# Patient Record
Sex: MALE | Race: WHITE | NOT HISPANIC OR LATINO | URBAN - METROPOLITAN AREA
[De-identification: names, ages, dates, MRNs, and addresses within clinical notes are randomized per-mention and may not be internally consistent; named-entity substitution may affect disease eponyms.]

---

## 2017-05-01 ENCOUNTER — EMERGENCY (EMERGENCY)
Facility: HOSPITAL | Age: 72
LOS: 0 days | Discharge: HOME | End: 2017-05-01
Admitting: INTERNAL MEDICINE

## 2017-06-28 DIAGNOSIS — M79.675 PAIN IN LEFT TOE(S): ICD-10-CM

## 2017-06-28 DIAGNOSIS — M79.89 OTHER SPECIFIED SOFT TISSUE DISORDERS: ICD-10-CM

## 2017-06-28 DIAGNOSIS — Z98.890 OTHER SPECIFIED POSTPROCEDURAL STATES: ICD-10-CM

## 2017-09-29 ENCOUNTER — OUTPATIENT (OUTPATIENT)
Dept: OUTPATIENT SERVICES | Facility: HOSPITAL | Age: 72
LOS: 1 days | Discharge: HOME | End: 2017-09-29

## 2017-09-29 DIAGNOSIS — E55.9 VITAMIN D DEFICIENCY, UNSPECIFIED: ICD-10-CM

## 2017-09-29 DIAGNOSIS — K76.89 OTHER SPECIFIED DISEASES OF LIVER: ICD-10-CM

## 2017-09-29 DIAGNOSIS — N18.2 CHRONIC KIDNEY DISEASE, STAGE 2 (MILD): ICD-10-CM

## 2017-09-29 DIAGNOSIS — E11.9 TYPE 2 DIABETES MELLITUS WITHOUT COMPLICATIONS: ICD-10-CM

## 2017-09-29 DIAGNOSIS — N40.0 BENIGN PROSTATIC HYPERPLASIA WITHOUT LOWER URINARY TRACT SYMPTOMS: ICD-10-CM

## 2017-09-29 DIAGNOSIS — D64.9 ANEMIA, UNSPECIFIED: ICD-10-CM

## 2018-02-16 ENCOUNTER — OUTPATIENT (OUTPATIENT)
Dept: OUTPATIENT SERVICES | Facility: HOSPITAL | Age: 73
LOS: 1 days | Discharge: HOME | End: 2018-02-16

## 2018-02-16 DIAGNOSIS — D64.9 ANEMIA, UNSPECIFIED: ICD-10-CM

## 2018-02-16 DIAGNOSIS — N18.2 CHRONIC KIDNEY DISEASE, STAGE 2 (MILD): ICD-10-CM

## 2018-02-16 DIAGNOSIS — M32.9 SYSTEMIC LUPUS ERYTHEMATOSUS, UNSPECIFIED: ICD-10-CM

## 2018-02-16 DIAGNOSIS — E11.9 TYPE 2 DIABETES MELLITUS WITHOUT COMPLICATIONS: ICD-10-CM

## 2018-02-16 DIAGNOSIS — E78.00 PURE HYPERCHOLESTEROLEMIA, UNSPECIFIED: ICD-10-CM

## 2018-02-16 DIAGNOSIS — K76.89 OTHER SPECIFIED DISEASES OF LIVER: ICD-10-CM

## 2018-02-16 DIAGNOSIS — E55.9 VITAMIN D DEFICIENCY, UNSPECIFIED: ICD-10-CM

## 2018-06-27 ENCOUNTER — OUTPATIENT (OUTPATIENT)
Dept: OUTPATIENT SERVICES | Facility: HOSPITAL | Age: 73
LOS: 1 days | Discharge: HOME | End: 2018-06-27

## 2018-06-27 DIAGNOSIS — K76.89 OTHER SPECIFIED DISEASES OF LIVER: ICD-10-CM

## 2018-06-27 DIAGNOSIS — D51.9 VITAMIN B12 DEFICIENCY ANEMIA, UNSPECIFIED: ICD-10-CM

## 2018-06-27 DIAGNOSIS — E78.00 PURE HYPERCHOLESTEROLEMIA, UNSPECIFIED: ICD-10-CM

## 2018-06-27 DIAGNOSIS — N40.0 BENIGN PROSTATIC HYPERPLASIA WITHOUT LOWER URINARY TRACT SYMPTOMS: ICD-10-CM

## 2018-06-27 DIAGNOSIS — E11.9 TYPE 2 DIABETES MELLITUS WITHOUT COMPLICATIONS: ICD-10-CM

## 2018-07-19 ENCOUNTER — OUTPATIENT (OUTPATIENT)
Dept: OUTPATIENT SERVICES | Facility: HOSPITAL | Age: 73
LOS: 1 days | Discharge: HOME | End: 2018-07-19

## 2018-07-19 DIAGNOSIS — N39.9 DISORDER OF URINARY SYSTEM, UNSPECIFIED: ICD-10-CM

## 2018-11-20 ENCOUNTER — OUTPATIENT (OUTPATIENT)
Dept: OUTPATIENT SERVICES | Facility: HOSPITAL | Age: 73
LOS: 1 days | Discharge: HOME | End: 2018-11-20

## 2018-11-20 DIAGNOSIS — E11.9 TYPE 2 DIABETES MELLITUS WITHOUT COMPLICATIONS: ICD-10-CM

## 2018-11-20 DIAGNOSIS — N39.0 URINARY TRACT INFECTION, SITE NOT SPECIFIED: ICD-10-CM

## 2018-11-20 DIAGNOSIS — G93.3 POSTVIRAL AND RELATED FATIGUE SYNDROMES: ICD-10-CM

## 2018-11-20 DIAGNOSIS — E78.00 PURE HYPERCHOLESTEROLEMIA, UNSPECIFIED: ICD-10-CM

## 2019-01-16 ENCOUNTER — INPATIENT (INPATIENT)
Facility: HOSPITAL | Age: 74
LOS: 3 days | Discharge: HOME HEALTH | End: 2019-01-20
Attending: HOSPITALIST | Admitting: HOSPITALIST

## 2019-01-16 VITALS
RESPIRATION RATE: 18 BRPM | HEART RATE: 68 BPM | TEMPERATURE: 99 F | DIASTOLIC BLOOD PRESSURE: 83 MMHG | SYSTOLIC BLOOD PRESSURE: 145 MMHG | OXYGEN SATURATION: 99 %

## 2019-01-16 DIAGNOSIS — Z90.79 ACQUIRED ABSENCE OF OTHER GENITAL ORGAN(S): Chronic | ICD-10-CM

## 2019-01-16 DIAGNOSIS — I50.43 ACUTE ON CHRONIC COMBINED SYSTOLIC (CONGESTIVE) AND DIASTOLIC (CONGESTIVE) HEART FAILURE: ICD-10-CM

## 2019-01-16 DIAGNOSIS — T82.855A STENOSIS OF CORONARY ARTERY STENT, INITIAL ENCOUNTER: ICD-10-CM

## 2019-01-16 LAB
ALBUMIN SERPL ELPH-MCNC: 4.5 G/DL — SIGNIFICANT CHANGE UP (ref 3.5–5.2)
ALP SERPL-CCNC: 73 U/L — SIGNIFICANT CHANGE UP (ref 30–115)
ALT FLD-CCNC: 26 U/L — SIGNIFICANT CHANGE UP (ref 0–41)
ANION GAP SERPL CALC-SCNC: 17 MMOL/L — HIGH (ref 7–14)
ANION GAP SERPL CALC-SCNC: 20 MMOL/L — HIGH (ref 7–14)
ANION GAP SERPL CALC-SCNC: 22 MMOL/L — HIGH (ref 7–14)
APTT BLD: 136.6 SEC — CRITICAL HIGH (ref 27–39.2)
APTT BLD: 29.9 SEC — SIGNIFICANT CHANGE UP (ref 27–39.2)
APTT BLD: 42.1 SEC — HIGH (ref 27–39.2)
AST SERPL-CCNC: 22 U/L — SIGNIFICANT CHANGE UP (ref 0–41)
BASE EXCESS BLDV CALC-SCNC: 0.2 MMOL/L — SIGNIFICANT CHANGE UP (ref -2–2)
BASOPHILS # BLD AUTO: 0.02 K/UL — SIGNIFICANT CHANGE UP (ref 0–0.2)
BASOPHILS NFR BLD AUTO: 0.2 % — SIGNIFICANT CHANGE UP (ref 0–1)
BILIRUB SERPL-MCNC: 0.5 MG/DL — SIGNIFICANT CHANGE UP (ref 0.2–1.2)
BUN SERPL-MCNC: 19 MG/DL — SIGNIFICANT CHANGE UP (ref 10–20)
BUN SERPL-MCNC: 20 MG/DL — SIGNIFICANT CHANGE UP (ref 10–20)
BUN SERPL-MCNC: 20 MG/DL — SIGNIFICANT CHANGE UP (ref 10–20)
CA-I SERPL-SCNC: 1.18 MMOL/L — SIGNIFICANT CHANGE UP (ref 1.12–1.3)
CALCIUM SERPL-MCNC: 9.1 MG/DL — SIGNIFICANT CHANGE UP (ref 8.5–10.1)
CALCIUM SERPL-MCNC: 9.2 MG/DL — SIGNIFICANT CHANGE UP (ref 8.5–10.1)
CALCIUM SERPL-MCNC: 9.3 MG/DL — SIGNIFICANT CHANGE UP (ref 8.5–10.1)
CHLORIDE SERPL-SCNC: 100 MMOL/L — SIGNIFICANT CHANGE UP (ref 98–110)
CHLORIDE SERPL-SCNC: 96 MMOL/L — LOW (ref 98–110)
CHLORIDE SERPL-SCNC: 99 MMOL/L — SIGNIFICANT CHANGE UP (ref 98–110)
CHOLEST SERPL-MCNC: 146 MG/DL — SIGNIFICANT CHANGE UP (ref 100–200)
CK MB CFR SERPL CALC: 573.6 NG/ML — HIGH (ref 0.6–6.3)
CK MB CFR SERPL CALC: >300 NG/ML — HIGH (ref 0.6–6.3)
CK SERPL-CCNC: 4791 U/L — HIGH (ref 0–225)
CK SERPL-CCNC: 5000 U/L — HIGH (ref 0–225)
CO2 SERPL-SCNC: 15 MMOL/L — LOW (ref 17–32)
CO2 SERPL-SCNC: 21 MMOL/L — SIGNIFICANT CHANGE UP (ref 17–32)
CO2 SERPL-SCNC: 22 MMOL/L — SIGNIFICANT CHANGE UP (ref 17–32)
CREAT SERPL-MCNC: 0.9 MG/DL — SIGNIFICANT CHANGE UP (ref 0.7–1.5)
CREAT SERPL-MCNC: 1 MG/DL — SIGNIFICANT CHANGE UP (ref 0.7–1.5)
CREAT SERPL-MCNC: 1 MG/DL — SIGNIFICANT CHANGE UP (ref 0.7–1.5)
EOSINOPHIL # BLD AUTO: 0.01 K/UL — SIGNIFICANT CHANGE UP (ref 0–0.7)
EOSINOPHIL NFR BLD AUTO: 0.1 % — SIGNIFICANT CHANGE UP (ref 0–8)
ESTIMATED AVERAGE GLUCOSE: 117 MG/DL — HIGH (ref 68–114)
GAS PNL BLDV: 140 MMOL/L — SIGNIFICANT CHANGE UP (ref 136–145)
GAS PNL BLDV: SIGNIFICANT CHANGE UP
GLUCOSE BLDC GLUCOMTR-MCNC: 138 MG/DL — HIGH (ref 70–99)
GLUCOSE SERPL-MCNC: 149 MG/DL — HIGH (ref 70–99)
GLUCOSE SERPL-MCNC: 151 MG/DL — HIGH (ref 70–99)
GLUCOSE SERPL-MCNC: 169 MG/DL — HIGH (ref 70–99)
HBA1C BLD-MCNC: 5.7 % — HIGH (ref 4–5.6)
HCO3 BLDV-SCNC: 25 MMOL/L — SIGNIFICANT CHANGE UP (ref 22–29)
HCT VFR BLD CALC: 45.3 % — SIGNIFICANT CHANGE UP (ref 42–52)
HCT VFR BLD CALC: 47.4 % — SIGNIFICANT CHANGE UP (ref 42–52)
HCT VFR BLD CALC: 48.2 % — SIGNIFICANT CHANGE UP (ref 42–52)
HCT VFR BLDA CALC: 50.1 % — HIGH (ref 34–44)
HDLC SERPL-MCNC: 46 MG/DL — SIGNIFICANT CHANGE UP
HGB BLD CALC-MCNC: 16.3 G/DL — SIGNIFICANT CHANGE UP (ref 14–18)
HGB BLD-MCNC: 15.6 G/DL — SIGNIFICANT CHANGE UP (ref 14–18)
HGB BLD-MCNC: 16 G/DL — SIGNIFICANT CHANGE UP (ref 14–18)
HGB BLD-MCNC: 16.4 G/DL — SIGNIFICANT CHANGE UP (ref 14–18)
IMM GRANULOCYTES NFR BLD AUTO: 0.5 % — HIGH (ref 0.1–0.3)
INR BLD: 1.12 RATIO — SIGNIFICANT CHANGE UP (ref 0.65–1.3)
INR BLD: 1.2 RATIO — SIGNIFICANT CHANGE UP (ref 0.65–1.3)
LACTATE BLDV-MCNC: 1.9 MMOL/L — HIGH (ref 0.5–1.6)
LIPID PNL WITH DIRECT LDL SERPL: 87 MG/DL — SIGNIFICANT CHANGE UP (ref 4–129)
LYMPHOCYTES # BLD AUTO: 1.1 K/UL — LOW (ref 1.2–3.4)
LYMPHOCYTES # BLD AUTO: 8.5 % — LOW (ref 20.5–51.1)
MAGNESIUM SERPL-MCNC: 1.8 MG/DL — SIGNIFICANT CHANGE UP (ref 1.8–2.4)
MAGNESIUM SERPL-MCNC: 1.9 MG/DL — SIGNIFICANT CHANGE UP (ref 1.8–2.4)
MCHC RBC-ENTMCNC: 28.4 PG — SIGNIFICANT CHANGE UP (ref 27–31)
MCHC RBC-ENTMCNC: 28.6 PG — SIGNIFICANT CHANGE UP (ref 27–31)
MCHC RBC-ENTMCNC: 29 PG — SIGNIFICANT CHANGE UP (ref 27–31)
MCHC RBC-ENTMCNC: 33.8 G/DL — SIGNIFICANT CHANGE UP (ref 32–37)
MCHC RBC-ENTMCNC: 34 G/DL — SIGNIFICANT CHANGE UP (ref 32–37)
MCHC RBC-ENTMCNC: 34.4 G/DL — SIGNIFICANT CHANGE UP (ref 32–37)
MCV RBC AUTO: 83.1 FL — SIGNIFICANT CHANGE UP (ref 80–94)
MCV RBC AUTO: 84.2 FL — SIGNIFICANT CHANGE UP (ref 80–94)
MCV RBC AUTO: 85.3 FL — SIGNIFICANT CHANGE UP (ref 80–94)
MONOCYTES # BLD AUTO: 0.89 K/UL — HIGH (ref 0.1–0.6)
MONOCYTES NFR BLD AUTO: 6.8 % — SIGNIFICANT CHANGE UP (ref 1.7–9.3)
NEUTROPHILS # BLD AUTO: 10.92 K/UL — HIGH (ref 1.4–6.5)
NEUTROPHILS NFR BLD AUTO: 83.9 % — HIGH (ref 42.2–75.2)
NRBC # BLD: 0 /100 WBCS — SIGNIFICANT CHANGE UP (ref 0–0)
NT-PROBNP SERPL-SCNC: 165 PG/ML — SIGNIFICANT CHANGE UP (ref 0–300)
PCO2 BLDV: 39 MMHG — LOW (ref 41–51)
PH BLDV: 7.41 — SIGNIFICANT CHANGE UP (ref 7.26–7.43)
PHOSPHATE SERPL-MCNC: 2.6 MG/DL — SIGNIFICANT CHANGE UP (ref 2.1–4.9)
PLATELET # BLD AUTO: 207 K/UL — SIGNIFICANT CHANGE UP (ref 130–400)
PLATELET # BLD AUTO: 213 K/UL — SIGNIFICANT CHANGE UP (ref 130–400)
PLATELET # BLD AUTO: 227 K/UL — SIGNIFICANT CHANGE UP (ref 130–400)
PO2 BLDV: 41 MMHG — HIGH (ref 20–40)
POTASSIUM BLDV-SCNC: 3.5 MMOL/L — SIGNIFICANT CHANGE UP (ref 3.3–5.6)
POTASSIUM SERPL-MCNC: 3.9 MMOL/L — SIGNIFICANT CHANGE UP (ref 3.5–5)
POTASSIUM SERPL-MCNC: 4.8 MMOL/L — SIGNIFICANT CHANGE UP (ref 3.5–5)
POTASSIUM SERPL-MCNC: 5.1 MMOL/L — HIGH (ref 3.5–5)
POTASSIUM SERPL-SCNC: 3.9 MMOL/L — SIGNIFICANT CHANGE UP (ref 3.5–5)
POTASSIUM SERPL-SCNC: 4.8 MMOL/L — SIGNIFICANT CHANGE UP (ref 3.5–5)
POTASSIUM SERPL-SCNC: 5.1 MMOL/L — HIGH (ref 3.5–5)
PROT SERPL-MCNC: 7.5 G/DL — SIGNIFICANT CHANGE UP (ref 6–8)
PROTHROM AB SERPL-ACNC: 12.9 SEC — HIGH (ref 9.95–12.87)
PROTHROM AB SERPL-ACNC: 13.8 SEC — HIGH (ref 9.95–12.87)
RBC # BLD: 5.45 M/UL — SIGNIFICANT CHANGE UP (ref 4.7–6.1)
RBC # BLD: 5.63 M/UL — SIGNIFICANT CHANGE UP (ref 4.7–6.1)
RBC # BLD: 5.65 M/UL — SIGNIFICANT CHANGE UP (ref 4.7–6.1)
RBC # FLD: 12.9 % — SIGNIFICANT CHANGE UP (ref 11.5–14.5)
RBC # FLD: 13 % — SIGNIFICANT CHANGE UP (ref 11.5–14.5)
RBC # FLD: 13.2 % — SIGNIFICANT CHANGE UP (ref 11.5–14.5)
SAO2 % BLDV: 77 % — SIGNIFICANT CHANGE UP
SODIUM SERPL-SCNC: 133 MMOL/L — LOW (ref 135–146)
SODIUM SERPL-SCNC: 138 MMOL/L — SIGNIFICANT CHANGE UP (ref 135–146)
SODIUM SERPL-SCNC: 141 MMOL/L — SIGNIFICANT CHANGE UP (ref 135–146)
TOTAL CHOLESTEROL/HDL RATIO MEASUREMENT: 3.2 RATIO — LOW (ref 4–5.5)
TRIGL SERPL-MCNC: 141 MG/DL — SIGNIFICANT CHANGE UP (ref 10–149)
TROPONIN T SERPL-MCNC: 0.01 NG/ML — SIGNIFICANT CHANGE UP
TROPONIN T SERPL-MCNC: 10.63 NG/ML — CRITICAL HIGH
TROPONIN T SERPL-MCNC: 5.94 NG/ML — CRITICAL HIGH
WBC # BLD: 10.58 K/UL — SIGNIFICANT CHANGE UP (ref 4.8–10.8)
WBC # BLD: 13 K/UL — HIGH (ref 4.8–10.8)
WBC # BLD: 19.88 K/UL — HIGH (ref 4.8–10.8)
WBC # FLD AUTO: 10.58 K/UL — SIGNIFICANT CHANGE UP (ref 4.8–10.8)
WBC # FLD AUTO: 13 K/UL — HIGH (ref 4.8–10.8)
WBC # FLD AUTO: 19.88 K/UL — HIGH (ref 4.8–10.8)

## 2019-01-16 RX ORDER — ATORVASTATIN CALCIUM 80 MG/1
80 TABLET, FILM COATED ORAL AT BEDTIME
Qty: 0 | Refills: 0 | Status: DISCONTINUED | OUTPATIENT
Start: 2019-01-16 | End: 2019-01-20

## 2019-01-16 RX ORDER — ASPIRIN/CALCIUM CARB/MAGNESIUM 324 MG
162 TABLET ORAL DAILY
Qty: 0 | Refills: 0 | Status: DISCONTINUED | OUTPATIENT
Start: 2019-01-16 | End: 2019-01-16

## 2019-01-16 RX ORDER — NITROGLYCERIN 6.5 MG
0.4 CAPSULE, EXTENDED RELEASE ORAL ONCE
Qty: 0 | Refills: 0 | Status: COMPLETED | OUTPATIENT
Start: 2019-01-16 | End: 2019-01-16

## 2019-01-16 RX ORDER — ONDANSETRON 8 MG/1
4 TABLET, FILM COATED ORAL ONCE
Qty: 0 | Refills: 0 | Status: COMPLETED | OUTPATIENT
Start: 2019-01-16 | End: 2019-01-17

## 2019-01-16 RX ORDER — TICAGRELOR 90 MG/1
90 TABLET ORAL
Qty: 0 | Refills: 0 | Status: DISCONTINUED | OUTPATIENT
Start: 2019-01-16 | End: 2019-01-19

## 2019-01-16 RX ORDER — CLOPIDOGREL BISULFATE 75 MG/1
300 TABLET, FILM COATED ORAL ONCE
Qty: 0 | Refills: 0 | Status: DISCONTINUED | OUTPATIENT
Start: 2019-01-16 | End: 2019-01-16

## 2019-01-16 RX ORDER — CLOPIDOGREL BISULFATE 75 MG/1
600 TABLET, FILM COATED ORAL ONCE
Qty: 0 | Refills: 0 | Status: COMPLETED | OUTPATIENT
Start: 2019-01-16 | End: 2019-01-16

## 2019-01-16 RX ORDER — HEPARIN SODIUM 5000 [USP'U]/ML
1000 INJECTION INTRAVENOUS; SUBCUTANEOUS
Qty: 25000 | Refills: 0 | Status: DISCONTINUED | OUTPATIENT
Start: 2019-01-16 | End: 2019-01-16

## 2019-01-16 RX ORDER — CHLORHEXIDINE GLUCONATE 213 G/1000ML
1 SOLUTION TOPICAL
Qty: 0 | Refills: 0 | Status: DISCONTINUED | OUTPATIENT
Start: 2019-01-16 | End: 2019-01-20

## 2019-01-16 RX ORDER — ASPIRIN/CALCIUM CARB/MAGNESIUM 324 MG
81 TABLET ORAL DAILY
Qty: 0 | Refills: 0 | Status: DISCONTINUED | OUTPATIENT
Start: 2019-01-16 | End: 2019-01-16

## 2019-01-16 RX ORDER — ENOXAPARIN SODIUM 100 MG/ML
40 INJECTION SUBCUTANEOUS DAILY
Qty: 0 | Refills: 0 | Status: DISCONTINUED | OUTPATIENT
Start: 2019-01-16 | End: 2019-01-16

## 2019-01-16 RX ORDER — PANTOPRAZOLE SODIUM 20 MG/1
8 TABLET, DELAYED RELEASE ORAL
Qty: 80 | Refills: 0 | Status: DISCONTINUED | OUTPATIENT
Start: 2019-01-16 | End: 2019-01-16

## 2019-01-16 RX ORDER — PANTOPRAZOLE SODIUM 20 MG/1
40 TABLET, DELAYED RELEASE ORAL EVERY 12 HOURS
Qty: 0 | Refills: 0 | Status: DISCONTINUED | OUTPATIENT
Start: 2019-01-16 | End: 2019-01-20

## 2019-01-16 RX ORDER — TICAGRELOR 90 MG/1
180 TABLET ORAL ONCE
Qty: 0 | Refills: 0 | Status: COMPLETED | OUTPATIENT
Start: 2019-01-16 | End: 2019-01-16

## 2019-01-16 RX ORDER — PANTOPRAZOLE SODIUM 20 MG/1
80 TABLET, DELAYED RELEASE ORAL ONCE
Qty: 0 | Refills: 0 | Status: DISCONTINUED | OUTPATIENT
Start: 2019-01-16 | End: 2019-01-16

## 2019-01-16 RX ORDER — HEPARIN SODIUM 5000 [USP'U]/ML
600 INJECTION INTRAVENOUS; SUBCUTANEOUS
Qty: 25000 | Refills: 0 | Status: DISCONTINUED | OUTPATIENT
Start: 2019-01-16 | End: 2019-01-17

## 2019-01-16 RX ORDER — MORPHINE SULFATE 50 MG/1
2 CAPSULE, EXTENDED RELEASE ORAL EVERY 4 HOURS
Qty: 0 | Refills: 0 | Status: DISCONTINUED | OUTPATIENT
Start: 2019-01-16 | End: 2019-01-20

## 2019-01-16 RX ORDER — ONDANSETRON 8 MG/1
4 TABLET, FILM COATED ORAL ONCE
Qty: 0 | Refills: 0 | Status: COMPLETED | OUTPATIENT
Start: 2019-01-16 | End: 2019-01-16

## 2019-01-16 RX ORDER — SODIUM CHLORIDE 9 MG/ML
1000 INJECTION INTRAMUSCULAR; INTRAVENOUS; SUBCUTANEOUS
Qty: 0 | Refills: 0 | Status: DISCONTINUED | OUTPATIENT
Start: 2019-01-16 | End: 2019-01-17

## 2019-01-16 RX ORDER — LOSARTAN POTASSIUM 100 MG/1
50 TABLET, FILM COATED ORAL DAILY
Qty: 0 | Refills: 0 | Status: DISCONTINUED | OUTPATIENT
Start: 2019-01-16 | End: 2019-01-16

## 2019-01-16 RX ORDER — METOPROLOL TARTRATE 50 MG
25 TABLET ORAL
Qty: 0 | Refills: 0 | Status: DISCONTINUED | OUTPATIENT
Start: 2019-01-16 | End: 2019-01-16

## 2019-01-16 RX ORDER — CLOPIDOGREL BISULFATE 75 MG/1
75 TABLET, FILM COATED ORAL DAILY
Qty: 0 | Refills: 0 | Status: DISCONTINUED | OUTPATIENT
Start: 2019-01-16 | End: 2019-01-16

## 2019-01-16 RX ORDER — ATORVASTATIN CALCIUM 80 MG/1
40 TABLET, FILM COATED ORAL AT BEDTIME
Qty: 0 | Refills: 0 | Status: DISCONTINUED | OUTPATIENT
Start: 2019-01-16 | End: 2019-01-16

## 2019-01-16 RX ORDER — ASPIRIN/CALCIUM CARB/MAGNESIUM 324 MG
81 TABLET ORAL DAILY
Qty: 0 | Refills: 0 | Status: DISCONTINUED | OUTPATIENT
Start: 2019-01-16 | End: 2019-01-20

## 2019-01-16 RX ORDER — METOPROLOL TARTRATE 50 MG
50 TABLET ORAL
Qty: 0 | Refills: 0 | Status: DISCONTINUED | OUTPATIENT
Start: 2019-01-16 | End: 2019-01-18

## 2019-01-16 RX ORDER — PANTOPRAZOLE SODIUM 20 MG/1
40 TABLET, DELAYED RELEASE ORAL
Qty: 0 | Refills: 0 | Status: DISCONTINUED | OUTPATIENT
Start: 2019-01-16 | End: 2019-01-16

## 2019-01-16 RX ORDER — LOSARTAN POTASSIUM 100 MG/1
25 TABLET, FILM COATED ORAL DAILY
Qty: 0 | Refills: 0 | Status: DISCONTINUED | OUTPATIENT
Start: 2019-01-16 | End: 2019-01-16

## 2019-01-16 RX ADMIN — HEPARIN SODIUM 8 UNIT(S)/HR: 5000 INJECTION INTRAVENOUS; SUBCUTANEOUS at 16:30

## 2019-01-16 RX ADMIN — CLOPIDOGREL BISULFATE 300 MILLIGRAM(S): 75 TABLET, FILM COATED ORAL at 12:00

## 2019-01-16 RX ADMIN — Medication 50 MILLIGRAM(S): at 19:37

## 2019-01-16 RX ADMIN — Medication 25 MILLIGRAM(S): at 07:18

## 2019-01-16 RX ADMIN — HEPARIN SODIUM 6 UNIT(S)/HR: 5000 INJECTION INTRAVENOUS; SUBCUTANEOUS at 20:15

## 2019-01-16 RX ADMIN — HEPARIN SODIUM 8 UNIT(S)/HR: 5000 INJECTION INTRAVENOUS; SUBCUTANEOUS at 21:59

## 2019-01-16 RX ADMIN — ATORVASTATIN CALCIUM 80 MILLIGRAM(S): 80 TABLET, FILM COATED ORAL at 21:51

## 2019-01-16 RX ADMIN — Medication 81 MILLIGRAM(S): at 03:31

## 2019-01-16 RX ADMIN — Medication 0.4 MILLIGRAM(S): at 12:00

## 2019-01-16 RX ADMIN — TICAGRELOR 180 MILLIGRAM(S): 90 TABLET ORAL at 14:48

## 2019-01-16 RX ADMIN — TICAGRELOR 90 MILLIGRAM(S): 90 TABLET ORAL at 19:37

## 2019-01-16 RX ADMIN — CLOPIDOGREL BISULFATE 600 MILLIGRAM(S): 75 TABLET, FILM COATED ORAL at 03:32

## 2019-01-16 RX ADMIN — Medication 81 MILLIGRAM(S): at 14:45

## 2019-01-16 RX ADMIN — MORPHINE SULFATE 2 MILLIGRAM(S): 50 CAPSULE, EXTENDED RELEASE ORAL at 14:46

## 2019-01-16 RX ADMIN — PANTOPRAZOLE SODIUM 40 MILLIGRAM(S): 20 TABLET, DELAYED RELEASE ORAL at 16:47

## 2019-01-16 RX ADMIN — ONDANSETRON 4 MILLIGRAM(S): 8 TABLET, FILM COATED ORAL at 12:00

## 2019-01-16 RX ADMIN — Medication 0.4 MILLIGRAM(S): at 11:45

## 2019-01-16 RX ADMIN — PANTOPRAZOLE SODIUM 40 MILLIGRAM(S): 20 TABLET, DELAYED RELEASE ORAL at 10:56

## 2019-01-16 RX ADMIN — ONDANSETRON 4 MILLIGRAM(S): 8 TABLET, FILM COATED ORAL at 23:31

## 2019-01-16 RX ADMIN — CHLORHEXIDINE GLUCONATE 1 APPLICATION(S): 213 SOLUTION TOPICAL at 10:57

## 2019-01-16 RX ADMIN — MORPHINE SULFATE 2 MILLIGRAM(S): 50 CAPSULE, EXTENDED RELEASE ORAL at 16:44

## 2019-01-16 RX ADMIN — SODIUM CHLORIDE 75 MILLILITER(S): 9 INJECTION INTRAMUSCULAR; INTRAVENOUS; SUBCUTANEOUS at 13:17

## 2019-01-16 NOTE — CONSULT NOTE ADULT - SUBJECTIVE AND OBJECTIVE BOX
Patient is a 73y old  Male who presents with a chief complaint of chest pain   HPI:  73 yr old male with PMH of prostate cancer s/p prostatectomy p/w acute onset substernal chest pressure associated with left arm numbness which started 30 mins prior to presentation to ED. ECG showed ST elevation and STEMI code was called to which cardiology team responded immediately and assessed pt bedside. Pt will be transferred to cath lab for emergent cardiac cath. Pt denies fever/palpitations/cough/ h/o heart disease     ROS:  All other systems reviewed and are negative    PAST MEDICAL & SURGICAL HISTORY  Prostate cancer  H/O prostatectomy      FAMILY HISTORY:  FAMILY HISTORY:  non contributory    SOCIAL HISTORY:  []smoker-fomer smoker   [-]Alcohol  [-]Drug    ALLERGIES:  No Known Allergies      MEDICATIONS:  MEDICATIONS  (STANDING):  aspirin  chewable 81 milliGRAM(s) Oral daily  aspirin  chewable 81 milliGRAM(s) Oral daily    MEDICATIONS  (PRN):      HOME MEDICATIONS:  Home Medications:      VITALS:   T(F): 98.7 (01-16 @ 03:30), Max: 98.7 (01-16 @ 03:30)  HR: 68 (01-16 @ 03:30) (68 - 68)  BP: 145/83 (01-16 @ 03:30) (145/83 - 145/83)  BP(mean): 105 (01-16 @ 03:30) (105 - 105)  RR: 18 (01-16 @ 03:30) (18 - 18)  SpO2: 99% (01-16 @ 03:30) (99% - 99%)    I&O's Summary      PHYSICAL EXAM:  GEN: Alert and oriented X 3, No acute distress  HEENT: no pallor  NECK: Supple, no JVD  LUNGS: Clear to auscultation bilaterally  CARDIOVASCULAR: S1/S2 regular, no murmurs or rubs  ABD: Soft, BS+  EXT: No Lower extremity edema/cyanosis  NEURO: Non focal  SKIN: Intact    LABS:                    Troponin trend:        Hemoglobin A1C   Thyroid      RADIOLOGY:    ECG:  SR, anterolateral ST elevation

## 2019-01-16 NOTE — ED PROVIDER NOTE - OBJECTIVE STATEMENT
73 y.o male with no known medical problems presents to the ED for evaluation of midsternal chest pain 30 minutes prior to arrival that radiates down left arm.  Upon EMS arrival pt with STEMI.  Given nitro with relief of the pain.  Adds no other complaints at this time.  Denies dyspnea, hemoptysis, fever, chills, URI sxs.

## 2019-01-16 NOTE — ED PROVIDER NOTE - PROGRESS NOTE DETAILS
cardiology fellow at bedside discussed case with Dr. Mcdonough.  aware of admission cardiology fellow anugu at bedside I personally evaluated the patient. I reviewed the Physician Assistant Fellow's note and agree with the findings and plan.

## 2019-01-16 NOTE — CHART NOTE - NSCHARTNOTEFT_GEN_A_CORE
Called by RN as pressure on Femstop was reduced than initially intended. Inflated femstop to approx 80 mmHg myself. Patient complaining of mild nausea, instructed housestaff to start patient on Zofran PRN. Patient's groin is soft with no sig. palpable hematoma. Called by RN as pressure on Femstop was reduced than initially intended. Inflated femstop to approx 80 mmHg myself. Patient complaining of mild nausea, instructed housestaff to start patient on Zofran PRN. Patient's groin is soft with small sized palpable hematoma.

## 2019-01-16 NOTE — PROGRESS NOTE ADULT - SUBJECTIVE AND OBJECTIVE BOX
POST OPERATIVE PROCEDURAL DOCUMENTATION   Preliminary Cardiac Catherization Post-Procedure Report:    PRE-OP DIAGNOSIS: stemi, anterior wall    PROCEDURE:     Left Heart Catheterization     LV Angiogram     Selective Coronary Angiogram     PCI of LAD     IVUS of LAD    Primary Physician:  Wagner Randhawa M.D.  Cardiac Fellow:      ANESTHESIA TYPE: Local    ESTIMATED BLOOD LOSS:  Less than 10 cc    CONDITION: Stable    SPECIMENS REMOVED (IF APPLICABLE): Not Applicable    IMPLANTS (IF APPLICABLE): DANYELLE    DESCRIPTION OF PROCEDURRE:  The right and left femoral groins, as well as the right radial artery were prepped and draped in the usual sterile fashion. Following local instillation of lidocane, a 6Fr introducer was inserted percutaneously into the right femoral artery  using the seldinger technique. Following this, multiple guidewires and pre shaped catheters were used to adequately opacifiy the coronary arteries and perform left heart catheterization as well as an LV angiogram, using the Judkin's approach. Left heart catheterization and left ventricular angiogram was performed in the OCAMPO projection. Following this, selective coronary angiography was performed in multiple obliquities. Pci was then performed on the LAD. Ivus was also done on athe lad stent.  At the end of the procedure, all guidewires and catheters were removed. Homostasis was obtained by manual compression. There were no complications.                                 FINDINGS OF PROCEDURE:  1. Left Heart Catheterization: LVEDP: 20-25                                                       LV Pressure: 130/20-24                                                       CA Pressure: 130/70                   2. LV Angiogram:  The LV is moderately enlarged                                   Overall, there is severe LV systolic dysfunction. There is akenesia of anterior wall and apex.                                   No significant segmental wall contraction abnormalities seen.                                   The EF is approx. 20%                                   No mitral valve prolapse, mitral insufficiency or mitral annular calcification seen.   3. SELECTIVE CORONARY ANGIOGRAM:                           LEFT MAIN: Normal                           LAD: 100% ostial occlusion                           LCX: 75% distal lesion                           OBTUSE MARGINAL: 50-75% lesion                           RCA: 50-75% proximal lesions                           PDA: Normal                           PLV: Normal  4. The coronary circulation was right  dominant.    5.  PCI was performed on the 100% ostial LAD occlusion. Pre-dilated with a 2.5 and 3.0mm balloon. 2 DANYELLE were placed in the proximal and mid LAD. Following deployement there were no residual lesions and TIMI3 flow     Post Procedure Diagnosis/Impression: Successful PCI with placement of DANYELLE in the 100% proximal LAD, Severe LV systolic dysfunction.                         Complications:  None    PLAN OF CARE:       CCU monitoriing                               Return to In-patient bed                              Continue DAPT, B-blocker, Statin therapy and ace inhibitor                              Continue medical therapy

## 2019-01-16 NOTE — CHART NOTE - NSCHARTNOTEFT_GEN_A_CORE
Patient seen and examined by myself, labs reviewed  Patient still has nausea, did not receive any zofran. Reinforced RN to administer zofran PRN as requested in EMR  Femstop deflated, groin is soft, small hematoma appreciated, not growing in size, no visible signs of bleeding  Labs showed subtherapeutic PTT, reinforced to housestaff to increase IV heparin to maintain therapeutic PTT

## 2019-01-16 NOTE — PROGRESS NOTE ADULT - SUBJECTIVE AND OBJECTIVE BOX
POST OPERATIVE PROCEDURAL DOCUMENTATION   Preliminary Cardiac Catherization Post-Procedure Report:    PRE-OP DIAGNOSIS:     PROCEDURE:     Selective Coronary Angiogram     Peripheral Angiogram     Insertion of IABP    Primary Physician:  Wagner Randhawa M.D.    ANESTHESIA TYPE: Sedation/Local/Regional    ESTIMATED BLOOD LOSS:  Less than 10 cc    CONDITION: Critical    SPECIMENS REMOVED (IF APPLICABLE): Not Applicable    IMPLANTS (IF APPLICABLE): DANYELLE    DESCRIPTION OF PROCEDURRE:  The right femoral artery was draped in usual sterile fashion. A 6 [place] citizen introducer was placed percutaneously and selective coronary angiogram was done.                                                   This revealed 100% occlusion of the stent at the proximal edge.                                                    Repeat PCI was performed and placement of a 2.25 mm DANYELLE placed in the distal LAD                                                   Post stent deployement and balloon dilation of the proximal stent at high pressures with a non-compliaant balloon, there was LEIGHTON 3 flow and excellent angiographic result     In addition, an Intra-aortic ballon pump was inserted for hemodynamic support for 24 hours.     PERIPHERAL ANGIOGRAM was performed, which revealed no significant aorta-illiac disease. A closure device using ___ was done.        Post Procedure Diagnosis/Impression:  Stent Thrombosis with successful recanilization of the proximal DANYELLE with POBA and placement of a DANYELLE in the distal LAD                         Complications:  None    PLAN OF CARE:  CCU, anti-platelet therapy

## 2019-01-16 NOTE — H&P ADULT - FAMILY HISTORY
Sibling  Still living? Yes, Estimated age: Age Unknown  Family history of heart attack, Age at diagnosis: Age Unknown

## 2019-01-16 NOTE — H&P ADULT - NSHPLABSRESULTS_GEN_ALL_CORE
15.6   10.58 )-----------( 227      ( 16 Jan 2019 03:19 )             45.3               PT/INR - ( 16 Jan 2019 03:19 )   PT: 12.90 sec;   INR: 1.12 ratio         PTT - ( 16 Jan 2019 03:19 )  PTT:29.9 sec              POCT Blood Glucose.: 158 mg/dL (16 Jan 2019 03:24)

## 2019-01-16 NOTE — ED ADULT NURSE NOTE - NSIMPLEMENTINTERV_GEN_ALL_ED
Implemented All Universal Safety Interventions:  [place] to call system. Call bell, personal items and telephone within reach. Instruct patient to call for assistance. Room bathroom lighting operational. Non-slip footwear when patient is off stretcher. Physically safe environment: no spills, clutter or unnecessary equipment. Stretcher in lowest position, wheels locked, appropriate side rails in place.

## 2019-01-16 NOTE — PATIENT PROFILE ADULT - INFORMATION PROVIDED TO:
Pt is concerned about ongoing upper back pain. He notices symptom after sitting for long periods. Occasional episode after walking up a flight of stairs with SOB. He has used sl NTG 3-5x/week with relief of upper back pain. Denies chest pain.  His lower back pain subsided once he stopped Repatha. Currently not taking any statin.    NM stress test 5/2017:   IMPRESSION:  1. Normal stress/rest myocardial perfusion imaging with evidence for  diaphragmatic attenuation artifact in the basilar inferior wall.  2. Normal gated study with a calculated ejection fraction of 66%.   Compared to the prior stress test from September 15, 2015, no significant  difference is were appreciated.  Stress portion:  IMPRESSION:  Treadmill did not show any definitive signs for coronary artery disease as the patient had no chest pain or mid scapular pain with exercise.  He has an abnormal resting 12-lead ECG, which was predominantly abnormal in the inferior limb leads with exaggeration of the abnormalities with exercise.  Correlation with a nuclear scan is required and will be reported separately.  The patient's exercise capacity was only 4.6 METS, which is only 50% of that predicted for his age, but he did achieve 95% of his maximum predicted pulse.    Last clinic note 3/1/2016:   1.  Coronary artery disease s/p CABG in July 2013 ALIZE-LAD, SVG-OM2.    Due to episodes of chest pain/ shoulder and arm pain with exertion pt underwent cardiac cath 2/2016.  His bypass grafts are patent but patient had developed severe disease in OM 3 .  He underwent successful percutaneous intervention and stenting of proximal and mid segment of the left circumflex artery with drug-eluting stents.  Now chest pain free   Developed shortness of breath with Brilinta which has resolved after he was switched to Plavix.  Pt to continue with aspirin and Plavix.   Encouraged to exercise regularly.   2. Hyperlipidemia:   Intolerant to multiple statins in the past  .  Currently tolerating Livalo and Zetia for the last 3 weeks.   Recommend Mediterranean diet.  Check FLP in 3 weeks.   3. PAD:   KWAME 1/2014 showed resting KWAME of 1.1 and post exercise KWAME of 0.9 bilaterally . No symptoms of claudication.   4.  Post op Atrial fib: follows with Dr Marion, continue with Sotalol and aspirin    Will discuss with Dr Crystal.     patient

## 2019-01-16 NOTE — ED PROVIDER NOTE - PHYSICAL EXAMINATION
CONST: Well appearing in NAD  EYES: Sclera and conjunctiva clear.  ENT: No nasal discharge.  Oropharynx normal appearing, no erythema or exudates. Uvula midline.  NECK: Non-tender, supple  CARD: Normal S1 S2; Normal rate and rhythm  RESP: Equal BS B/L, No wheezes, rhonchi or rales. No distress  GI: Soft, non-tender, non-distended.  MS: Normal ROM in all extremities. No edema of lower extremities, no calf pain, radial pulses 2+ bilaterally  SKIN: Warm, dry, no acute rashes. Good turgor  NEURO: A&Ox3, No focal deficits. Strength 5/5 with no sensory deficits

## 2019-01-16 NOTE — H&P ADULT - ATTENDING COMMENTS
HPI as above.  Interval history:  Pt seen and examined at bedside.   Vitals stable  Exam stable  Labs reviewed  Plan continue current management   #Handoff:  Pending:  Family discussion:  Dispo: HPI as above.  Interval history: Currently having active chest pain.  Pt seen and examined at bedside.  Currently sob and having active cp. Seen by Cardio fellow planning to take pt to CAth lab again.  Vital Signs (24 Hrs):  T(C): 36.4 (01-16-19 @ 08:00), Max: 37.1 (01-16-19 @ 03:30)  HR: 66 (01-16-19 @ 11:42) (66 - 80)  BP: 116/77 (01-16-19 @ 11:42) (109/81 - 145/83)  RR: 24 (01-16-19 @ 11:42) (15 - 24)  SpO2: 97% (01-16-19 @ 11:42) (94% - 99%)  Wt(kg): --  Daily Height in cm: 170.18 (16 Jan 2019 06:00)    Daily     I&O's Summary    16 Jan 2019 07:01  -  16 Jan 2019 13:24  --------------------------------------------------------  IN: 100 mL / OUT: 400 mL / NET: -300 mL      Exam stable  Labs reviewed    74 y/o male with PMHx of prostate cancer s/p prostatectomy p/w Chest pain 30 mins before presentation. patient was found to have STEMI and patient was take to the cath lab.    #STEMI- s/p PCI was performed on the 100% ostial LAD occlusion. Pre-dilated with a 2.5 and 3.0mm balloon. 2 DANYELLE were placed in the proximal and mid LAD. EF 20%.  -follow up re cath   -continue medical management as per cardiology   -Pt will need AICD for primary prevention once stable  - Follow up echo in 3 months  -Check A1c          #Handoff:  Pending:  Family discussion:  Dispo:

## 2019-01-16 NOTE — H&P ADULT - HISTORY OF PRESENT ILLNESS
74 y/o male with PMHx of prostate cancer s/p prostatectomy p/w Chest pain 30 mins before presentation. patient chest pain started as pressure, 10/10, radiating to the left arm. it was associated with sweating, nausea and SOB. patient felt weak and came to the ED. No fever, cough, v/d, bowel or urinary symptoms. In the ED, patient was found to have STEMI in the antro-lateral leads.   Patient has no cardiac history and he doesn't take medications. 72 y/o male with PMHx of prostate cancer s/p prostatectomy p/w Chest pain 30 mins before presentation. patient chest pain started as pressure, 10/10, radiating to the left arm. it was associated with sweating, nausea and SOB. patient felt weak and came to the ED. No fever, cough, v/d, bowel or urinary symptoms. In the ED, patient was found to have STEMI in the antro-lateral leads.   Patient has no cardiac history and he doesn't take medications.  in the ED Vital Signs Last 24 Hrs  T(C): 37.1 (16 Jan 2019 03:30), Max: 37.1 (16 Jan 2019 03:30)  T(F): 98.7 (16 Jan 2019 03:30), Max: 98.7 (16 Jan 2019 03:30)  HR: 68 (16 Jan 2019 03:30) (68 - 68)  BP: 145/83 (16 Jan 2019 03:30) (145/83 - 145/83)  BP(mean): 105 (16 Jan 2019 03:30) (105 - 105)  RR: 18 (16 Jan 2019 03:30) (18 - 18)  SpO2: 99% (16 Jan 2019 03:30) (99% - 99%)    Patient was give aspirin 81 x2 and Plavix 600

## 2019-01-16 NOTE — ED PROVIDER NOTE - NS ED ROS FT
Constitutional: See HPI.  Eyes: No visual changes, eye pain or discharge.   ENMT: No hearing changes, pain, discharge or   Cardiac: + chest pain. No SOB or edema.   Respiratory: No cough or respiratory distress. No hemoptysis.   GI: No nausea, vomiting, diarrhea or abdominal pain.  : No dysuria, frequency or burning. No Discharge  MS: No myalgia, muscle weakness, joint pain or back pain.  Neuro: No headache or weakness. No LOC.  Skin: No skin rash.  Except as documented in the HPI, all other systems are negative.

## 2019-01-16 NOTE — H&P ADULT - ASSESSMENT
72 y/o male with PMHx of prostate cancer s/p prostatectomy p/w Chest pain 30 mins before presentation. patient was found to have STEMI and patient was take to the cath lab.      #STEMI   admit to ccu after cath  monitor VS  2d echo  lipid profile and HBa1c  aspirin and plavis load  BB and statin start after cath    #Others  NPO for now   DVT and GI PPX  from home  Full code

## 2019-01-16 NOTE — PROGRESS NOTE ADULT - SUBJECTIVE AND OBJECTIVE BOX
EVELYN MORALES 73y Male  MRN#: [id number]   CODE STATUS:________      SUBJECTIVE  HPI    Overnight events     Subjective complaints     Present Today:   - Rose  - Drains   - Central Line :                                             ----------------------------------------------------------  OBJECTIVE  PAST MEDICAL & SURGICAL HISTORY  Prostate cancer  H/O prostatectomy                                            -----------------------------------------------------------  ALLERGIES:  No Known Allergies                                            ------------------------------------------------------------  MEDICATIONS:  STANDING MEDICATIONS  aspirin enteric coated 81 milliGRAM(s) Oral daily  atorvastatin 80 milliGRAM(s) Oral at bedtime  chlorhexidine 4% Liquid 1 Application(s) Topical <User Schedule>  metoprolol tartrate 50 milliGRAM(s) Oral two times a day  nitroglycerin     SubLingual 0.4 milliGRAM(s) SubLingual once  nitroglycerin     SubLingual 0.4 milliGRAM(s) SubLingual once  ondansetron Injectable 4 milliGRAM(s) IV Push once  ondansetron Injectable 4 milliGRAM(s) IV Push once  pantoprazole  Injectable 40 milliGRAM(s) IV Push every 12 hours  sodium chloride 0.9%. 1000 milliLiter(s) IV Continuous <Continuous>  ticagrelor 90 milliGRAM(s) Oral two times a day  ticagrelor 180 milliGRAM(s) Oral once    PRN MEDICATIONS  morphine  - Injectable 2 milliGRAM(s) IV Push every 4 hours PRN                                            ------------------------------------------------------------  VITAL SIGNS: Last 24 Hours  T(C): 36.4 (16 Jan 2019 08:00), Max: 37.1 (16 Jan 2019 03:30)  T(F): 97.6 (16 Jan 2019 08:00), Max: 98.7 (16 Jan 2019 03:30)  HR: 66 (16 Jan 2019 11:42) (66 - 80)  BP: 116/77 (16 Jan 2019 11:42) (109/81 - 145/83)  BP(mean): 99 (16 Jan 2019 11:42) (89 - 105)  RR: 24 (16 Jan 2019 11:42) (15 - 24)  SpO2: 97% (16 Jan 2019 11:42) (94% - 99%)      01-16-19 @ 07:01  -  01-16-19 @ 14:39  --------------------------------------------------------  IN: 100 mL / OUT: 400 mL / NET: -300 mL                                             --------------------------------------------------------------  LABS:                        16.0   13.00 )-----------( 207      ( 16 Jan 2019 11:29 )             47.4     01-16    138  |  99  |  20  ----------------------------<  149<H>  4.8   |  22  |  1.0    Ca    9.2      16 Jan 2019 11:29  Phos  2.6     01-16  Mg     1.9     01-16    TPro  7.5  /  Alb  4.5  /  TBili  0.5  /  DBili  x   /  AST  22  /  ALT  26  /  AlkPhos  73  01-16    PT/INR - ( 16 Jan 2019 03:19 )   PT: 12.90 sec;   INR: 1.12 ratio         PTT - ( 16 Jan 2019 03:19 )  PTT:29.9 sec      Creatine Kinase, Serum: 4791 U/L <H> (01-16-19 @ 11:50)  Troponin T, Serum: 5.94 ng/mL <HH> (01-16-19 @ 11:50)  Troponin T, Serum: 0.01 ng/mL (01-16-19 @ 03:19)          CARDIAC MARKERS ( 16 Jan 2019 11:50 )  x     / 5.94 ng/mL / 4791 U/L / x     / 573.6 ng/mL  CARDIAC MARKERS ( 16 Jan 2019 03:19 )  x     / 0.01 ng/mL / x     / x     / x                                                  -------------------------------------------------------------  RADIOLOGY:                                            --------------------------------------------------------------    PHYSICAL EXAM:    GENERAL: NAD, well-developed, AAOx3  HEENT:  Atraumatic, Normocephalic. EOMI, PERRLA, conjunctiva and sclera clear, No JVD  PULMONARY: Clear to auscultation bilaterally; No wheeze  CARDIOVASCULAR: Regular rate and rhythm; No murmurs, rubs, or gallops  GASTROINTESTINAL: Soft, Nontender, Nondistended; Bowel sounds present  MUSCULOSKELETAL:  2+ Peripheral Pulses, No clubbing, cyanosis, or edema  NEUROLOGY: non-focal  SKIN: No rashes or lesions                                           --------------------------------------------------------------    ASSESSMENT & PLAN    # DVT prophylaxis     # GI prophylaxis     # Diet     # Activity     # Code status     # Disposition                                                                              -------------------------------------------------------- EVELYN MORALES 73y Male  MRN#: [id number]   CODE STATUS: FULL CODE      SUBJECTIVE  Overnight events - None. Pt is s/p cath with 2 DANYELLE were placed in the proximal and mid LAD.     Subjective complaints - This AM around 12pm noon, pt was complaining of chest discomfort with nausea, and diaphoresis. As per pt, The pain felt the same as the chest pain on admission. 12 lead EKG was done which showed     Present Today:   -Rose  -A line with balloon pump    OBJECTIVE  PAST MEDICAL & SURGICAL HISTORY  Prostate cancer  H/O prostatectomy                                            -----------------------------------------------------------  ALLERGIES:  No Known Allergies                                            ------------------------------------------------------------  MEDICATIONS:  STANDING MEDICATIONS  aspirin enteric coated 81 milliGRAM(s) Oral daily  atorvastatin 80 milliGRAM(s) Oral at bedtime  chlorhexidine 4% Liquid 1 Application(s) Topical <User Schedule>  metoprolol tartrate 50 milliGRAM(s) Oral two times a day  nitroglycerin     SubLingual 0.4 milliGRAM(s) SubLingual once  nitroglycerin     SubLingual 0.4 milliGRAM(s) SubLingual once  ondansetron Injectable 4 milliGRAM(s) IV Push once  ondansetron Injectable 4 milliGRAM(s) IV Push once  pantoprazole  Injectable 40 milliGRAM(s) IV Push every 12 hours  sodium chloride 0.9%. 1000 milliLiter(s) IV Continuous <Continuous>  ticagrelor 90 milliGRAM(s) Oral two times a day  ticagrelor 180 milliGRAM(s) Oral once    PRN MEDICATIONS  morphine  - Injectable 2 milliGRAM(s) IV Push every 4 hours PRN                                            ------------------------------------------------------------  VITAL SIGNS: Last 24 Hours  T(C): 36.4 (16 Jan 2019 08:00), Max: 37.1 (16 Jan 2019 03:30)  T(F): 97.6 (16 Jan 2019 08:00), Max: 98.7 (16 Jan 2019 03:30)  HR: 66 (16 Jan 2019 11:42) (66 - 80)  BP: 116/77 (16 Jan 2019 11:42) (109/81 - 145/83)  BP(mean): 99 (16 Jan 2019 11:42) (89 - 105)  RR: 24 (16 Jan 2019 11:42) (15 - 24)  SpO2: 97% (16 Jan 2019 11:42) (94% - 99%)      01-16-19 @ 07:01  -  01-16-19 @ 14:39  --------------------------------------------------------  IN: 100 mL / OUT: 400 mL / NET: -300 mL                                             --------------------------------------------------------------  LABS:                        16.0   13.00 )-----------( 207      ( 16 Jan 2019 11:29 )             47.4     01-16    138  |  99  |  20  ----------------------------<  149<H>  4.8   |  22  |  1.0    Ca    9.2      16 Jan 2019 11:29  Phos  2.6     01-16  Mg     1.9     01-16    TPro  7.5  /  Alb  4.5  /  TBili  0.5  /  DBili  x   /  AST  22  /  ALT  26  /  AlkPhos  73  01-16    PT/INR - ( 16 Jan 2019 03:19 )   PT: 12.90 sec;   INR: 1.12 ratio      PTT - ( 16 Jan 2019 03:19 )  PTT:29.9 sec    Creatine Kinase, Serum: 4791 U/L <H> (01-16-19 @ 11:50)  Troponin T, Serum: 5.94 ng/mL <HH> (01-16-19 @ 11:50)  Troponin T, Serum: 0.01 ng/mL (01-16-19 @ 03:19)    CARDIAC MARKERS ( 16 Jan 2019 11:50 )  x     / 5.94 ng/mL / 4791 U/L / x     / 573.6 ng/mL  CARDIAC MARKERS ( 16 Jan 2019 03:19 )  x     / 0.01 ng/mL / x     / x     / x                                                  -------------------------------------------------------------  RADIOLOGY:  < from: Xray Chest 1 View AP/PA (01.16.19 @ 03:54) >    Impression:      Low lung volumes Limited evaluation.  Follow-up suggested.    < from: Transthoracic Echocardiogram (01.16.19 @ 08:54) >  Summary:   1. Left ventricular ejection fraction, by visual estimation, is 35 to   40%.   2. Multiple left ventricular regional wall motion abnormalities exist.   See wall motion findings.   3. Mildly increased LV wall thickness.   4. Spectral Doppler shows impaired relaxation pattern of left   ventricular myocardial filling (Grade I diastolic dysfunction).   5. Trivial pericardial effusion.   6. Mild tricuspid regurgitation.   7. Mild aortic regurgitation.   8. Estimated pulmonary artery systolic pressure is 48.4 mmHg assuming a   right atrial pressure of 5 mmHg, which is consistent with mild pulmonary   hypertension.                                              --------------------------------------------------------------    PHYSICAL EXAM:    GENERAL: Distress due to pain. Diaphoretic.   HEENT:  LAILA  PULMONARY: Basialr crackles  CARDIOVASCULAR: Regular rate and rhythm  GASTROINTESTINAL: Soft, Nontender, Nondistended; Bowel sounds present  MUSCULOSKELETAL:  2+ Peripheral Pulses, No clubbing, cyanosis, or edema  NEUROLOGY: non-focal  SKIN: No rashes or lesions                                           --------------------------------------------------------------    ASSESSMENT & PLAN    72 y/o male with PMHx of prostate cancer s/p prostatectomy p/w Chest pain 30 mins before presentation. patient was found to have STEMI and patient was take to the cath lab.    #STEMI       #DVT prophylaxis On Heparin gtt  #GI prophylaxis Protonix IV bid  #Diet DASH  #Activity IAT  #Code status FULL  #Disposition c/w CCU                                                                             -------------------------------------------------------- EVELYN MORALES 73y Male  MRN#: [id number]   CODE STATUS: FULL CODE      SUBJECTIVE  Overnight events - None. Pt is s/p cath with 2 DANYELLE were placed in the proximal and mid LAD.     Subjective complaints - This AM around 12pm noon, pt was complaining of chest discomfort with nausea, and diaphoresis. As per pt, The pain felt the same as the chest pain on admission. 12 lead EKG was done. Stat cardiac enzymes were sent. Cardiac fellow informed. Pt was taken for emergent cath. Pt was loaded with Brilinta but was nauseous and had episode of vomiting. Pt was on integrelin drip while in cath lab. Pt to be started on Brilinta d/c Plavix and start heparin. Pt has a balloon pump in.     Present Today:   -Rose  -A line with balloon pump    OBJECTIVE  PAST MEDICAL & SURGICAL HISTORY  Prostate cancer  H/O prostatectomy                                            -----------------------------------------------------------  ALLERGIES:  No Known Allergies                                            ------------------------------------------------------------  MEDICATIONS:  STANDING MEDICATIONS  aspirin enteric coated 81 milliGRAM(s) Oral daily  atorvastatin 80 milliGRAM(s) Oral at bedtime  chlorhexidine 4% Liquid 1 Application(s) Topical <User Schedule>  metoprolol tartrate 50 milliGRAM(s) Oral two times a day  nitroglycerin SubLingual 0.4 milliGRAM(s) SubLingual once  nitroglycerin SubLingual 0.4 milliGRAM(s) SubLingual once  ondansetron Injectable 4 milliGRAM(s) IV Push once  ondansetron Injectable 4 milliGRAM(s) IV Push once  pantoprazole  Injectable 40 milliGRAM(s) IV Push every 12 hours  sodium chloride 0.9%. 1000 milliLiter(s) IV Continuous <Continuous>  ticagrelor 90 milliGRAM(s) Oral two times a day  ticagrelor 180 milliGRAM(s) Oral once    PRN MEDICATIONS  morphine  - Injectable 2 milliGRAM(s) IV Push every 4 hours PRN                                            ------------------------------------------------------------  VITAL SIGNS: Last 24 Hours  T(C): 36.4 (16 Jan 2019 08:00), Max: 37.1 (16 Jan 2019 03:30)  T(F): 97.6 (16 Jan 2019 08:00), Max: 98.7 (16 Jan 2019 03:30)  HR: 66 (16 Jan 2019 11:42) (66 - 80)  BP: 116/77 (16 Jan 2019 11:42) (109/81 - 145/83)  BP(mean): 99 (16 Jan 2019 11:42) (89 - 105)  RR: 24 (16 Jan 2019 11:42) (15 - 24)  SpO2: 97% (16 Jan 2019 11:42) (94% - 99%)      01-16-19 @ 07:01  -  01-16-19 @ 14:39  --------------------------------------------------------  IN: 100 mL / OUT: 400 mL / NET: -300 mL                                             --------------------------------------------------------------  LABS:                        16.0   13.00 )-----------( 207      ( 16 Jan 2019 11:29 )             47.4     01-16    138  |  99  |  20  ----------------------------<  149<H>  4.8   |  22  |  1.0    Ca    9.2      16 Jan 2019 11:29  Phos  2.6     01-16  Mg     1.9     01-16    TPro  7.5  /  Alb  4.5  /  TBili  0.5  /  DBili  x   /  AST  22  /  ALT  26  /  AlkPhos  73  01-16    PT/INR - ( 16 Jan 2019 03:19 )   PT: 12.90 sec;   INR: 1.12 ratio      PTT - ( 16 Jan 2019 03:19 )  PTT:29.9 sec    Creatine Kinase, Serum: 4791 U/L <H> (01-16-19 @ 11:50)  Troponin T, Serum: 5.94 ng/mL <HH> (01-16-19 @ 11:50)  Troponin T, Serum: 0.01 ng/mL (01-16-19 @ 03:19)    CARDIAC MARKERS ( 16 Jan 2019 11:50 )  x     / 5.94 ng/mL / 4791 U/L / x     / 573.6 ng/mL  CARDIAC MARKERS ( 16 Jan 2019 03:19 )  x     / 0.01 ng/mL / x     / x     / x                                                  -------------------------------------------------------------  RADIOLOGY:  < from: Xray Chest 1 View AP/PA (01.16.19 @ 03:54) >    Impression:      Low lung volumes Limited evaluation.  Follow-up suggested.    < from: Transthoracic Echocardiogram (01.16.19 @ 08:54) >  Summary:   1. Left ventricular ejection fraction, by visual estimation, is 35 to   40%.   2. Multiple left ventricular regional wall motion abnormalities exist.   See wall motion findings.   3. Mildly increased LV wall thickness.   4. Spectral Doppler shows impaired relaxation pattern of left   ventricular myocardial filling (Grade I diastolic dysfunction).   5. Trivial pericardial effusion.   6. Mild tricuspid regurgitation.   7. Mild aortic regurgitation.   8. Estimated pulmonary artery systolic pressure is 48.4 mmHg assuming a   right atrial pressure of 5 mmHg, which is consistent with mild pulmonary   hypertension.                                              --------------------------------------------------------------    PHYSICAL EXAM:    GENERAL: Distress due to pain. Diaphoretic.   HEENT:  LAILA  PULMONARY: Bibasilar crackles  CARDIOVASCULAR: Regular rate and rhythm  GASTROINTESTINAL: Soft, Nontender, Nondistended; Bowel sounds present  MUSCULOSKELETAL:  2+ Peripheral Pulses, No clubbing, cyanosis, or edema  NEUROLOGY: non-focal  SKIN: No rashes or lesions                                           --------------------------------------------------------------    ASSESSMENT & PLAN    74 y/o male with PMHx of prostate cancer s/p prostatectomy p/w Chest pain 30 mins before presentation. patient was found to have STEMI and patient was take to the cath lab.    #STEMI - s/p LHCx2  -Anterior wall STEMI on presentation. s/p LHC with placement of DANYELLE in the 100% proximal LAD, Severe LV systolic dysfunction with EF of approx 20%   -Was loaded with asa and plavix prior to LHC and was started on asa and plavic, BB, statin, ace-i  -Recurrence of chest pain in the noon: Emergent LHC around noon- showed restenosis of DANYELLE in prox LAD. Pt was put on integrelin drip (vomited the loading dose of brilinta), and 2 DANYELLE placed in prox and distal LAD.   -Pt also has balloon pump.   -c/w Asa, brilinta, metoprolol. ACEi stopped d/t ODALIS  -c/w gentle hydration for 6 hrs; Might need lasix later on as pt has low EF)  -Will start heparin at 4pm and will follow PTT q4hrly.   -CE 5.96 at the time of chest pain. Will trend CE till decreasing. F/U CE at 8pm.   -Echo: EF 35-40%; Grade I diastolic dysfunction, trivial pericardial effusion, mild tricuspid regurgitation, mild aortic regurgitation.      #DVT prophylaxis On Heparin gtt  #GI prophylaxis Protonix IV bid  #Diet DASH  #Activity IAT  #Code status FULL  #Disposition c/w CCU                                                                             -------------------------------------------------------- EVELYN MORALES 73y Male  MRN#: [id number]   CODE STATUS: FULL CODE      SUBJECTIVE  Overnight events - None. Pt is s/p cath with 2 DANYELLE were placed in the proximal and mid LAD.     Subjective complaints - This AM around 12pm noon, pt was complaining of chest discomfort with nausea, and diaphoresis. As per pt, The pain felt the same as the chest pain on admission. 12 lead EKG was done which showed ST elevations in anterior and lateral leads with reciprocal changes in inf leads. Stat cardiac enzymes were sent. Cardiac fellow informed. Pt was taken for emergent cath. Pt was loaded with Brilinta but was nauseous and had episode of vomiting. Pt was on integrelin drip while in cath lab. Restenosis of DANYELLE found on 2nd cath. DANYELLE x2 in LAD done. Pt to be started on Brilinta d/c Plavix and start heparin. Pt has a balloon pump in.     Present Today:   -Rose  -A line with balloon pump    OBJECTIVE  PAST MEDICAL & SURGICAL HISTORY  Prostate cancer  H/O prostatectomy                                            -----------------------------------------------------------  ALLERGIES:  No Known Allergies                                            ------------------------------------------------------------  MEDICATIONS:  STANDING MEDICATIONS  aspirin enteric coated 81 milliGRAM(s) Oral daily  atorvastatin 80 milliGRAM(s) Oral at bedtime  chlorhexidine 4% Liquid 1 Application(s) Topical <User Schedule>  metoprolol tartrate 50 milliGRAM(s) Oral two times a day  nitroglycerin SubLingual 0.4 milliGRAM(s) SubLingual once  nitroglycerin SubLingual 0.4 milliGRAM(s) SubLingual once  ondansetron Injectable 4 milliGRAM(s) IV Push once  ondansetron Injectable 4 milliGRAM(s) IV Push once  pantoprazole  Injectable 40 milliGRAM(s) IV Push every 12 hours  sodium chloride 0.9%. 1000 milliLiter(s) IV Continuous <Continuous>  ticagrelor 90 milliGRAM(s) Oral two times a day  ticagrelor 180 milliGRAM(s) Oral once    PRN MEDICATIONS  morphine  - Injectable 2 milliGRAM(s) IV Push every 4 hours PRN                                            ------------------------------------------------------------  VITAL SIGNS: Last 24 Hours  T(C): 36.4 (16 Jan 2019 08:00), Max: 37.1 (16 Jan 2019 03:30)  T(F): 97.6 (16 Jan 2019 08:00), Max: 98.7 (16 Jan 2019 03:30)  HR: 66 (16 Jan 2019 11:42) (66 - 80)  BP: 116/77 (16 Jan 2019 11:42) (109/81 - 145/83)  BP(mean): 99 (16 Jan 2019 11:42) (89 - 105)  RR: 24 (16 Jan 2019 11:42) (15 - 24)  SpO2: 97% (16 Jan 2019 11:42) (94% - 99%)      01-16-19 @ 07:01  -  01-16-19 @ 14:39  --------------------------------------------------------  IN: 100 mL / OUT: 400 mL / NET: -300 mL                                             --------------------------------------------------------------  LABS:                        16.0   13.00 )-----------( 207      ( 16 Jan 2019 11:29 )             47.4     01-16    138  |  99  |  20  ----------------------------<  149<H>  4.8   |  22  |  1.0    Ca    9.2      16 Jan 2019 11:29  Phos  2.6     01-16  Mg     1.9     01-16    TPro  7.5  /  Alb  4.5  /  TBili  0.5  /  DBili  x   /  AST  22  /  ALT  26  /  AlkPhos  73  01-16    PT/INR - ( 16 Jan 2019 03:19 )   PT: 12.90 sec;   INR: 1.12 ratio      PTT - ( 16 Jan 2019 03:19 )  PTT:29.9 sec    Creatine Kinase, Serum: 4791 U/L <H> (01-16-19 @ 11:50)  Troponin T, Serum: 5.94 ng/mL <HH> (01-16-19 @ 11:50)  Troponin T, Serum: 0.01 ng/mL (01-16-19 @ 03:19)    CARDIAC MARKERS ( 16 Jan 2019 11:50 )  x     / 5.94 ng/mL / 4791 U/L / x     / 573.6 ng/mL  CARDIAC MARKERS ( 16 Jan 2019 03:19 )  x     / 0.01 ng/mL / x     / x     / x                                                  -------------------------------------------------------------  RADIOLOGY:  < from: Xray Chest 1 View AP/PA (01.16.19 @ 03:54) >    Impression:      Low lung volumes Limited evaluation.  Follow-up suggested.    < from: Transthoracic Echocardiogram (01.16.19 @ 08:54) >  Summary:   1. Left ventricular ejection fraction, by visual estimation, is 35 to   40%.   2. Multiple left ventricular regional wall motion abnormalities exist.   See wall motion findings.   3. Mildly increased LV wall thickness.   4. Spectral Doppler shows impaired relaxation pattern of left   ventricular myocardial filling (Grade I diastolic dysfunction).   5. Trivial pericardial effusion.   6. Mild tricuspid regurgitation.   7. Mild aortic regurgitation.   8. Estimated pulmonary artery systolic pressure is 48.4 mmHg assuming a   right atrial pressure of 5 mmHg, which is consistent with mild pulmonary   hypertension.                                              --------------------------------------------------------------    PHYSICAL EXAM:    GENERAL: Distress due to pain. Diaphoretic.   HEENT:  LAILA  PULMONARY: Bibasilar crackles  CARDIOVASCULAR: Regular rate and rhythm  GASTROINTESTINAL: Soft, Nontender, Nondistended; Bowel sounds present  MUSCULOSKELETAL:  2+ Peripheral Pulses, No clubbing, cyanosis, or edema  NEUROLOGY: non-focal  SKIN: No rashes or lesions                                           --------------------------------------------------------------    ASSESSMENT & PLAN    74 y/o male with PMHx of prostate cancer s/p prostatectomy p/w Chest pain 30 mins before presentation. patient was found to have STEMI and patient was take to the cath lab.    #STEMI - s/p LHCx2  -Anterior wall STEMI on presentation. s/p LHC with placement of DANYELLE in the 100% proximal LAD, Severe LV systolic dysfunction with EF of approx 20%   -Was loaded with asa and plavix prior to LHC and was started on asa and plavic, BB, statin, ace-i  -Recurrence of chest pain in the noon: Emergent LHC around noon- showed restenosis of DANYELLE in prox LAD. Pt was put on integrelin drip (vomited the loading dose of brilinta), and 2 DANYELLE placed in prox and distal LAD.   -Pt also has balloon pump.   -c/w Asa, brilinta, metoprolol. ACEi stopped d/t ODALIS  -c/w gentle hydration for 6 hrs; Might need lasix later on as pt has low EF)  -Will start heparin at 4pm and will follow PTT q4hrly.   -CE 5.96 at the time of chest pain. Will trend CE till decreasing. F/U CE at 8pm.   -Echo: EF 35-40%; Grade I diastolic dysfunction, trivial pericardial effusion, mild tricuspid regurgitation, mild aortic regurgitation.      #DVT prophylaxis On Heparin gtt  #GI prophylaxis Protonix IV bid  #Diet DASH  #Activity IAT  #Code status FULL  #Disposition c/w CCU                                                                             --------------------------------------------------------

## 2019-01-16 NOTE — ED PROVIDER NOTE - ATTENDING CONTRIBUTION TO CARE
73 year old male, denies pmhx, presenting with midsternal, dull chest pain that began suddenly 30 min PTA, radiating down his left arm, non-pleuritic, 9/10, without known palliative or provocative factors. Denies having this before or having any prior cardiac work up. In the field EMS found patient to have STEMI on EKG, given ASA and nitro with improvement of pain. Otherwise denies fevers, headache, vision changes, weakness/numbness, confusion, URI symptoms, neck pain, back pain, dyspnea, cough, palpitations, nausea, vomiting, abdominal pain, diarrhea, constipation, blood in stool/dark stools, urinary symptoms, penile discharge/testicular pain, leg swelling, rash, recent travel or sick contacts.    Vital Signs: I have reviewed the initial vital signs.  Constitutional: NAD, well-nourished, appears stated age, no acute distress.  HEENT: Airway patent, moist MM, no erythema/swelling/deformity of oral structures. EOMI, PERRLA.  CV: regular rate, regular rhythm, well-perfused extremities, 2+ b/l DP and radial pulses equal.  Lungs: BCTA, no increased WOB.  ABD: NTND, no guarding or rebound, no pulsatile mass, no hernias.   MSK: Neck supple, nontender, nl ROM, no stepoff. Chest nontender. Back nontender in TLS spine or to b/l bony structures or flanks. Ext nontender, nl rom, no deformity.   INTEG: Skin warm, dry, no rash.  NEURO: A&Ox3, normal strength, nl sensation throughout, normal speech.   PSYCH: Calm, cooperative, normal affect and interaction.    Code STEMI called on patient arrival - cardiology in ED seeing patient - EKG here confirms STEMI on EKG as documented above. Cardiology to take patient to cath lab.

## 2019-01-16 NOTE — ED PROVIDER NOTE - MEDICAL DECISION MAKING DETAILS
Patient presented with chest pain, found to have STEMI on EKG. Code STEMI called and cardiology evaluated patient - will take to cath lab. Otherwise HD stable. Will admit for further management and monitoring.

## 2019-01-16 NOTE — H&P ADULT - NSHPPHYSICALEXAM_GEN_ALL_CORE
T(C): 37.1 (01-16-19 @ 03:30), Max: 37.1 (01-16-19 @ 03:30)  HR: 68 (01-16-19 @ 03:30) (68 - 68)  BP: 145/83 (01-16-19 @ 03:30) (145/83 - 145/83)  RR: 18 (01-16-19 @ 03:30) (18 - 18)  SpO2: 99% (01-16-19 @ 03:30) (99% - 99%)    PHYSICAL EXAM:  GENERAL: NAD, well-developed  HEAD:  Atraumatic, Normocephalic  EYES: EOMI, PERRLA, conjunctiva and sclera clear  ENT:No nasal obstruction or discharge. No tonsillar exudate, swelling or erythema.  NECK: Supple, No JVD  CHEST/LUNG: Clear to auscultation bilaterally; No wheeze  HEART: Regular rate and rhythm; No murmurs, rubs, or gallops  ABDOMEN: Soft, Nontender, Nondistended; Bowel sounds present  EXTREMITIES:  2+ Peripheral Pulses, No clubbing, cyanosis, or edema  PSYCH: AAOx3  NEUROLOGY: non-focal  SKIN: No rashes or lesions

## 2019-01-16 NOTE — PROGRESS NOTE ADULT - SUBJECTIVE AND OBJECTIVE BOX
PRE-OPERATIVE DAY OF PROCEDURE EVALUATION NOTE:  I have personally seen and examined the patient. I agree with the history and physicial which I have reviewed and noted any changes below. signed electronically by Dr Wagner Randhawa 01-16-19 @ 05:48

## 2019-01-17 LAB
ALBUMIN SERPL ELPH-MCNC: 3.3 G/DL — LOW (ref 3.5–5.2)
ALP SERPL-CCNC: 71 U/L — SIGNIFICANT CHANGE UP (ref 30–115)
ALT FLD-CCNC: 60 U/L — HIGH (ref 0–41)
ANION GAP SERPL CALC-SCNC: 17 MMOL/L — HIGH (ref 7–14)
APTT BLD: 52 SEC — HIGH (ref 27–39.2)
APTT BLD: 56.3 SEC — HIGH (ref 27–39.2)
APTT BLD: 69.6 SEC — HIGH (ref 27–39.2)
AST SERPL-CCNC: 267 U/L — HIGH (ref 0–41)
BASOPHILS # BLD AUTO: 0.04 K/UL — SIGNIFICANT CHANGE UP (ref 0–0.2)
BASOPHILS NFR BLD AUTO: 0.2 % — SIGNIFICANT CHANGE UP (ref 0–1)
BILIRUB SERPL-MCNC: 1.1 MG/DL — SIGNIFICANT CHANGE UP (ref 0.2–1.2)
BUN SERPL-MCNC: 18 MG/DL — SIGNIFICANT CHANGE UP (ref 10–20)
CALCIUM SERPL-MCNC: 8.4 MG/DL — LOW (ref 8.5–10.1)
CHLORIDE SERPL-SCNC: 101 MMOL/L — SIGNIFICANT CHANGE UP (ref 98–110)
CHOLEST SERPL-MCNC: 101 MG/DL — SIGNIFICANT CHANGE UP (ref 100–200)
CK MB CFR SERPL CALC: 163.2 NG/ML — HIGH (ref 0.6–6.3)
CK SERPL-CCNC: 1893 U/L — HIGH (ref 0–225)
CO2 SERPL-SCNC: 19 MMOL/L — SIGNIFICANT CHANGE UP (ref 17–32)
CREAT SERPL-MCNC: 0.8 MG/DL — SIGNIFICANT CHANGE UP (ref 0.7–1.5)
EOSINOPHIL # BLD AUTO: 0.01 K/UL — SIGNIFICANT CHANGE UP (ref 0–0.7)
EOSINOPHIL NFR BLD AUTO: 0.1 % — SIGNIFICANT CHANGE UP (ref 0–8)
ESTIMATED AVERAGE GLUCOSE: 128 MG/DL — HIGH (ref 68–114)
GLUCOSE SERPL-MCNC: 121 MG/DL — HIGH (ref 70–99)
HBA1C BLD-MCNC: 6.1 % — HIGH (ref 4–5.6)
HCT VFR BLD CALC: 41.5 % — LOW (ref 42–52)
HDLC SERPL-MCNC: 34 MG/DL — LOW
HGB BLD-MCNC: 13.8 G/DL — LOW (ref 14–18)
IMM GRANULOCYTES NFR BLD AUTO: 0.6 % — HIGH (ref 0.1–0.3)
LIPID PNL WITH DIRECT LDL SERPL: 55 MG/DL — SIGNIFICANT CHANGE UP (ref 4–129)
LYMPHOCYTES # BLD AUTO: 1.33 K/UL — SIGNIFICANT CHANGE UP (ref 1.2–3.4)
LYMPHOCYTES # BLD AUTO: 7.4 % — LOW (ref 20.5–51.1)
MAGNESIUM SERPL-MCNC: 1.8 MG/DL — SIGNIFICANT CHANGE UP (ref 1.8–2.4)
MCHC RBC-ENTMCNC: 27.9 PG — SIGNIFICANT CHANGE UP (ref 27–31)
MCHC RBC-ENTMCNC: 33.3 G/DL — SIGNIFICANT CHANGE UP (ref 32–37)
MCV RBC AUTO: 84 FL — SIGNIFICANT CHANGE UP (ref 80–94)
MONOCYTES # BLD AUTO: 1.34 K/UL — HIGH (ref 0.1–0.6)
MONOCYTES NFR BLD AUTO: 7.5 % — SIGNIFICANT CHANGE UP (ref 1.7–9.3)
NEUTROPHILS # BLD AUTO: 15.16 K/UL — HIGH (ref 1.4–6.5)
NEUTROPHILS NFR BLD AUTO: 84.2 % — HIGH (ref 42.2–75.2)
NRBC # BLD: 0 /100 WBCS — SIGNIFICANT CHANGE UP (ref 0–0)
PLATELET # BLD AUTO: 178 K/UL — SIGNIFICANT CHANGE UP (ref 130–400)
POTASSIUM SERPL-MCNC: 4.2 MMOL/L — SIGNIFICANT CHANGE UP (ref 3.5–5)
POTASSIUM SERPL-SCNC: 4.2 MMOL/L — SIGNIFICANT CHANGE UP (ref 3.5–5)
PROT SERPL-MCNC: 6.7 G/DL — SIGNIFICANT CHANGE UP (ref 6–8)
RBC # BLD: 4.94 M/UL — SIGNIFICANT CHANGE UP (ref 4.7–6.1)
RBC # FLD: 13.2 % — SIGNIFICANT CHANGE UP (ref 11.5–14.5)
SODIUM SERPL-SCNC: 137 MMOL/L — SIGNIFICANT CHANGE UP (ref 135–146)
TOTAL CHOLESTEROL/HDL RATIO MEASUREMENT: 3 RATIO — LOW (ref 4–5.5)
TRIGL SERPL-MCNC: 76 MG/DL — SIGNIFICANT CHANGE UP (ref 10–149)
TROPONIN T SERPL-MCNC: 2.83 NG/ML — CRITICAL HIGH
WBC # BLD: 17.98 K/UL — HIGH (ref 4.8–10.8)
WBC # FLD AUTO: 17.98 K/UL — HIGH (ref 4.8–10.8)

## 2019-01-17 RX ORDER — METOPROLOL TARTRATE 50 MG
25 TABLET ORAL ONCE
Qty: 0 | Refills: 0 | Status: COMPLETED | OUTPATIENT
Start: 2019-01-17 | End: 2019-01-17

## 2019-01-17 RX ADMIN — Medication 25 MILLIGRAM(S): at 22:19

## 2019-01-17 RX ADMIN — ATORVASTATIN CALCIUM 80 MILLIGRAM(S): 80 TABLET, FILM COATED ORAL at 22:19

## 2019-01-17 RX ADMIN — Medication 50 MILLIGRAM(S): at 06:19

## 2019-01-17 RX ADMIN — PANTOPRAZOLE SODIUM 40 MILLIGRAM(S): 20 TABLET, DELAYED RELEASE ORAL at 06:09

## 2019-01-17 RX ADMIN — TICAGRELOR 90 MILLIGRAM(S): 90 TABLET ORAL at 18:19

## 2019-01-17 RX ADMIN — MORPHINE SULFATE 2 MILLIGRAM(S): 50 CAPSULE, EXTENDED RELEASE ORAL at 17:20

## 2019-01-17 RX ADMIN — PANTOPRAZOLE SODIUM 40 MILLIGRAM(S): 20 TABLET, DELAYED RELEASE ORAL at 18:19

## 2019-01-17 RX ADMIN — Medication 81 MILLIGRAM(S): at 12:44

## 2019-01-17 RX ADMIN — CHLORHEXIDINE GLUCONATE 1 APPLICATION(S): 213 SOLUTION TOPICAL at 06:08

## 2019-01-17 RX ADMIN — HEPARIN SODIUM 10 UNIT(S)/HR: 5000 INJECTION INTRAVENOUS; SUBCUTANEOUS at 02:10

## 2019-01-17 RX ADMIN — ONDANSETRON 4 MILLIGRAM(S): 8 TABLET, FILM COATED ORAL at 05:13

## 2019-01-17 RX ADMIN — TICAGRELOR 90 MILLIGRAM(S): 90 TABLET ORAL at 06:19

## 2019-01-17 RX ADMIN — MORPHINE SULFATE 2 MILLIGRAM(S): 50 CAPSULE, EXTENDED RELEASE ORAL at 17:05

## 2019-01-17 NOTE — PROGRESS NOTE ADULT - ASSESSMENT
1. S/P STEMI for anterior wall MI  2. S/P Stent Thrombosis of LAD DANYELLE  3. 3 Vessel CAD by cardiac cath  4. Moderate LV systolic dysfunction  5 .S/P prostatectomy    PLAN  D/C IABP today  continue same meds  CCU monitoring

## 2019-01-17 NOTE — CHART NOTE - NSCHARTNOTEFT_GEN_A_CORE
Patient seen and examined by myself, labs reviewed  Patient has no activbe complaints  groin is soft, small hematoma appreciated, no changes in size, no visible signs of bleeding  Labs showed subtherapeutic PTT, reinforced to housestaff to increase IV heparin to maintain therapeutic PTT

## 2019-01-17 NOTE — PROGRESS NOTE ADULT - SUBJECTIVE AND OBJECTIVE BOX
Cardiology Follow up    EVELYN MORALES   73y Male  PAST MEDICAL & SURGICAL HISTORY:  Prostate cancer  H/O prostatectomy     Allergies    No Known Allergies    Patient without complaints. Denies CP, SOB, palpitations, or dizziness  TELE: 3 short runs of NSVtack overnight (6/6/4 beats)  IABP 1:1  heparin gtt    Vital Signs Last 24 Hrs  T(C): 37.3 (17 Jan 2019 08:00), Max: 37.3 (17 Jan 2019 08:00)  T(F): 99.2 (17 Jan 2019 08:00), Max: 99.2 (17 Jan 2019 08:00)  HR: 78 (17 Jan 2019 10:00) (66 - 88)  BP: 78/46 (17 Jan 2019 10:00) (78/46 - 121/66)  BP(mean): 63 (17 Jan 2019 10:00) (49 - 103)  RR: 18 (17 Jan 2019 10:00) (13 - 26)  SpO2: 99% (17 Jan 2019 10:00) (94% - 100%)    MEDICATIONS  (STANDING):  aspirin enteric coated 81 milliGRAM(s) Oral daily  atorvastatin 80 milliGRAM(s) Oral at bedtime  chlorhexidine 4% Liquid 1 Application(s) Topical <User Schedule>  heparin  Infusion 600 Unit(s)/Hr (12 mL/Hr) IV Continuous <Continuous>  metoprolol tartrate 50 milliGRAM(s) Oral two times a day  pantoprazole  Injectable 40 milliGRAM(s) IV Push every 12 hours  ticagrelor 90 milliGRAM(s) Oral two times a day    MEDICATIONS  (PRN):  morphine  - Injectable 2 milliGRAM(s) IV Push every 4 hours PRN Moderate Pain (4 - 6)    REVIEW OF SYSTEMS:            CONSTITUTIONAL: No weakness, fevers or chills          EYES/ENT: No visual changes;  No vertigo or throat pain           NECK: No pain or stiffness          RESPIRATORY: No cough, wheezing, hemoptysis          CARDIOVASCULAR: no pain, no palpitations           GASTROINTESTINAL: No abdominal or epigastric pain. No nausea, vomiting, or hematemesis; No diarrhea or constipation.            GENITOURINARY: No dysuria, frequency or hematuria          NEUROLOGICAL: No numbness or weakness          SKIN: No itching, rashes    PHYSICAL EXAM:             CONSTITUTIONAL: Well-developed; well-nourished; in no acute distress.   	SKIN: warm, dry  	HEAD: Normocephalic; atraumatic.  	EYES: PERRL.  	ENT: No nasal discharge, airway clear, mucous membranes moist.  	NECK: Supple; non tender.  	CARD: +S1, +S2, no murmurs, gallops, or rubs. Regular rate and rhythm with IABP support 1:1  	RESP: No wheezes, rales or rhonchi. CTA B/L  	ABD: soft ntnd, + BS.  	EXT: moves all extremities,  no clubbing, cyanosis or edema.   	NEURO: Alert and oriented x3, no focal deficits.          PSYCH: Cooperative, appropriate.          EXTREMITY:             Right Groin: groin is soft, small hematoma appreciated, no changes in size, no visible signs of bleeding, clear dressing is on, no pain, + pulses    in b/l LE             VASCULAR:  +2 Rad / +2PTs / + 2DPs    ECG:     < from: 12 Lead ECG (01.17.19 @ 07:49) >  Ventricular Rate 75 BPM    Atrial Rate 75 BPM    P-R Interval 152 ms    QRS Duration 68 ms    Q-T Interval 440 ms    QTC Calculation(Bezet) 491 ms    P Axis 64 degrees    R Axis 67 degrees    T Axis 85 degrees    Diagnosis Line Normal sinus rhythm  Low voltage QRS  Anteroseptal infarct , age undetermined  T wave abnormality, consider lateral ischemia  Abnormal ECG    Confirmed by John Mcnally ([id number]) on 1/17/2019 9:14:05 AM                                                                   2D ECHO:    < from: Transthoracic Echocardiogram (01.16.19 @ 08:54) >  EXAM:  2-D ECHO (TTE) COMPLETE        PROCEDURE DATE:  01/16/2019      INTERPRETATION:  REPORT:    TRANSTHORACIC ECHOCARDIOGRAM REPORT         Patient Name:   EVELYN MORALES Accession #: [id number]  Medical Rec #:  [id number]     Height:      68.0 in 172.7 cm  YOB: 1945      Weight:      198.0 lb 89.81 kg  Patient Age:    73 years      BSA:         2.04 m²  Patient Gender: M             BP:          130/74 mmHg       Date of Exam:        1/16/2019 8:54:24 AM  Referring Physician: [id number] RONALD LADD  Sonographer:         PHOEBE  Fellow:              Soham Capps, , ,  Reading Physician:   David Estes M.D.    Procedure: 2D Echo/Doppler/Color Doppler Complete.  Diagnosis: I21.02 - elevation (STEMI) myocardial infarction involving left             anterior descending coronary artery         Summary:   1. Left ventricular ejection fraction, by visual estimation, is 35 to   40%.   2. Multiple left ventricular regional wall motion abnormalities exist.   See wall motion findings.   3. Mildly increased LV wall thickness.   4. Spectral Doppler shows impaired relaxation pattern of left   ventricular myocardial filling (Grade I diastolic dysfunction).   5. Trivial pericardial effusion.   6. Mild tricuspid regurgitation.   7. Mild aortic regurgitation.   8. Estimated pulmonary artery systolic pressure is 48.4 mmHg assuming a   right atrial pressure of 5 mmHg, which is consistent with mild pulmonary   hypertension.    PHYSICIAN INTERPRETATION:  Left Ventricle: The left ventricular internal cavity size is normal. Left   ventricular wall thickness is mildly increased. There is no left   ventricular hypertrophy. Left ventricular ejection fraction, by visual   estimation, is 35 to 40%. Spectral Doppler shows impaired relaxation   pattern of left ventricular myocardial filling (Grade I diastolic   dysfunction).       LV Wall Scoring:  The mid and apical anterior septum and apical anterior segment are   akinetic.  The basal and mid anterior wall, basal and mid inferior septum, and basal  anteroseptal segment are hypokinetic. All remaining scored segments are  normal.    Right Ventricle: Normal right ventricular size and function.  Left Atrium: Normal left atrial size.  Right Atrium: Normal right atrial size.  Pericardium: Trivial pericardial effusion is present.  Mitral Valve: Structurally normal mitral valve, with normal leaflet   excursion. No evidence of mitral valve regurgitation is seen.  Tricuspid Valve: Structurally normal tricuspid valve, with normal leaflet   excursion. Mild tricuspid regurgitation is visualized. Estimated   pulmonary artery systolic pressure is 48.4 mmHg assuming a right atrial   pressure of 5 mmHg, which is consistent with mild pulmonary hypertension.  Aortic Valve: Normal trileaflet aortic valve with normal opening. Mild   aortic valve regurgitation is seen.  Pulmonic Valve: Structurally normal pulmonic valve, with normal leaflet   excursion. No indication of pulmonic valve regurgitation.  Aorta: The aortic root and ascendingaorta are structurally normal, with   no evidence of dilitation.  Pulmonary Artery: The main pulmonary artery is normal in size.       2D AND M-MODE MEASUREMENTS (normal ranges within parentheses):  Left Ventricle:                  Normal   Aorta/Left Atrium:               Normal  IVSd (2D):              1.18 cm (0.7-1.1) AoV Cusp Separation: 1.85 cm   (1.5-2.6)  LVPWd (2D):             1.17 cm (0.7-1.1) Left Atrium (Mmode): 3.70 cm   (1.9-4.0)  LVIDd (2D):             4.74 cm (3.4-5.7)  LVIDs (2D):             2.99 cm  LV FS (2D):             36.9 %   (>25%)  Relative Wall Thickness  0.49    (<0.42)    SPECTRAL DOPPLER ANALYSIS:  LV DIASTOLIC FUNCTION:  MV Peak E: 0.82 m/s Decel Time: 172 msec  MV Peak A: 0.70 m/s  E/A Ratio: 1.17    Aortic Valve:  AoV VMax:    0.94 m/s AoV Area, Vmax: 2.68 cm² Vmax Indx: 1.32 cm²/m²  AoV Pk Grad: 3.5 mmHg    LVOT Vmax: 0.79 m/s  LVOT VTI:  0.13 m  LVOT Diam: 2.01 cm    Mitral Valve:  MV P1/2 Time: 49.84 msec  MV Area, PHT: 4.41 cm²    Tricuspid Valve and PA/RV Systolic Pressure: TR Max Velocity: 3.29 m/s RA   Pressure: 5 mmHg RVSP/PASP: 48.4 mmHg     [id number] David Estes M.D., Electronically signed on 1/16/2019 at 2:01:35   PM    LABS                        13.8   17.98 )-----------( 178      ( 17 Jan 2019 04:41 )             41.5     01-17    137  |  101  |  18  ----------------------------<  121<H>  4.2   |  19  |  0.8    Ca    8.4<L>      17 Jan 2019 04:41  Phos  2.6     01-16  Mg     1.8     01-17    TPro  6.7  /  Alb  3.3<L>  /  TBili  1.1  /  DBili  x   /  AST  267<H>  /  ALT  60<H>  /  AlkPhos  71  01-17    CARDIAC MARKERS ( 17 Jan 2019 04:41 )  x     / 2.83 ng/mL / 1893 U/L / x     / 163.2 ng/mL  CARDIAC MARKERS ( 16 Jan 2019 20:26 )  x     / 10.63 ng/mL / 5000 U/L / x     / >300.0 ng/mL  CARDIAC MARKERS ( 16 Jan 2019 11:50 )  x     / 5.94 ng/mL / 4791 U/L / x     / 573.6 ng/mL  CARDIAC MARKERS ( 16 Jan 2019 03:19 )  x     / 0.01 ng/mL / x     / x     / x          Magnesium, Serum: 1.8 mg/dL [1.8 - 2.4] (01-17-19 @ 04:41)  Magnesium, Serum: 1.9 mg/dL [1.8 - 2.4] (01-16-19 @ 11:29)  LIVER FUNCTIONS - ( 17 Jan 2019 04:41 )  Alb: 3.3 g/dL / Pro: 6.7 g/dL / ALK PHOS: 71 U/L / ALT: 60 U/L / AST: 267 U/L / GGT: x             A/P:  I discussed the case with Interventional Cardiologist Dr. Randhawa & recommend the following:    S/P STEMI - Anterior wall MI ( DANYELLE POBA, DANYELLE pLAD )    	     Continue DAPT (Brilinta 90 mg PO q 12 hr and Aspirin 81 mg PO daily),  B-Blocker, Statin Therapy                   Pt given instructions on importance of taking antiplatelet medication or risk acute stent thrombosis/death                   Post cath instructions, access site care and activity restrictions reviewed with patient                     - keep K = 4                   - keep Mg =2                   - maintain therapeutic PTT                   - monitor pulses ans IABP access site Cardiology Follow up    EVELYN MORALES   73y Male  PAST MEDICAL & SURGICAL HISTORY:  Prostate cancer  H/O prostatectomy     Allergies    No Known Allergies    Patient without complaints. Denies CP, SOB, palpitations, or dizziness  TELE: 3 short runs of NSVtack overnight (6/6/4 beats)  IABP 1:1  heparin gtt    Vital Signs Last 24 Hrs  T(C): 37.3 (17 Jan 2019 08:00), Max: 37.3 (17 Jan 2019 08:00)  T(F): 99.2 (17 Jan 2019 08:00), Max: 99.2 (17 Jan 2019 08:00)  HR: 78 (17 Jan 2019 10:00) (66 - 88)  BP: 78/46 (17 Jan 2019 10:00) (78/46 - 121/66)  BP(mean): 63 (17 Jan 2019 10:00) (49 - 103)  RR: 18 (17 Jan 2019 10:00) (13 - 26)  SpO2: 99% (17 Jan 2019 10:00) (94% - 100%)    MEDICATIONS  (STANDING):  aspirin enteric coated 81 milliGRAM(s) Oral daily  atorvastatin 80 milliGRAM(s) Oral at bedtime  chlorhexidine 4% Liquid 1 Application(s) Topical <User Schedule>  heparin  Infusion 600 Unit(s)/Hr (12 mL/Hr) IV Continuous <Continuous>  metoprolol tartrate 50 milliGRAM(s) Oral two times a day  pantoprazole  Injectable 40 milliGRAM(s) IV Push every 12 hours  ticagrelor 90 milliGRAM(s) Oral two times a day    MEDICATIONS  (PRN):  morphine  - Injectable 2 milliGRAM(s) IV Push every 4 hours PRN Moderate Pain (4 - 6)    REVIEW OF SYSTEMS:            CONSTITUTIONAL: No weakness, fevers or chills          EYES/ENT: No visual changes;  No vertigo or throat pain           NECK: No pain or stiffness          RESPIRATORY: No cough, wheezing, hemoptysis          CARDIOVASCULAR: no pain, no palpitations           GASTROINTESTINAL: No abdominal or epigastric pain. No nausea, vomiting, or hematemesis; No diarrhea or constipation.            GENITOURINARY: No dysuria, frequency or hematuria          NEUROLOGICAL: No numbness or weakness          SKIN: No itching, rashes    PHYSICAL EXAM:             CONSTITUTIONAL: Well-developed; well-nourished; in no acute distress.   	SKIN: warm, dry  	HEAD: Normocephalic; atraumatic.  	EYES: PERRL.  	ENT: No nasal discharge, airway clear, mucous membranes moist.  	NECK: Supple; non tender.  	CARD: +S1, +S2, no murmurs, gallops, or rubs. Regular rate and rhythm with IABP support 1:1  	RESP: No wheezes, rales or rhonchi. CTA B/L  	ABD: soft ntnd, + BS.  	EXT: moves all extremities,  no clubbing, cyanosis or edema.   	NEURO: Alert and oriented x3, no focal deficits.          PSYCH: Cooperative, appropriate.          EXTREMITY:             Right Groin: access site - groin is soft, ecchymosis area appreciated, clear dressing is on, no pain, + pulses in b/l LE +1             VASCULAR:  +2 Rad / +1PTs / + 1DPs    ECG:     < from: 12 Lead ECG (01.17.19 @ 07:49) >  Ventricular Rate 75 BPM    Atrial Rate 75 BPM    P-R Interval 152 ms    QRS Duration 68 ms    Q-T Interval 440 ms    QTC Calculation(Bezet) 491 ms    P Axis 64 degrees    R Axis 67 degrees    T Axis 85 degrees    Diagnosis Line Normal sinus rhythm  Low voltage QRS  Anteroseptal infarct , age undetermined  T wave abnormality, consider lateral ischemia  Abnormal ECG    Confirmed by Jonh Mcnally ([id number]) on 1/17/2019 9:14:05 AM                                                                   2D ECHO:    < from: Transthoracic Echocardiogram (01.16.19 @ 08:54) >  EXAM:  2-D ECHO (TTE) COMPLETE        PROCEDURE DATE:  01/16/2019      INTERPRETATION:  REPORT:    TRANSTHORACIC ECHOCARDIOGRAM REPORT         Patient Name:   EVELYN MORALES Accession #: [id number]  Medical Rec #:  [id number]     Height:      68.0 in 172.7 cm  YOB: 1945      Weight:      198.0 lb 89.81 kg  Patient Age:    73 years      BSA:         2.04 m²  Patient Gender: M             BP:          130/74 mmHg       Date of Exam:        1/16/2019 8:54:24 AM  Referring Physician: [id number] RONALD LADD  Sonographer:         PHOEBE  Fellow:              Soham Capps, , ,  Reading Physician:   David Estes M.D.    Procedure: 2D Echo/Doppler/Color Doppler Complete.  Diagnosis: I21.02 - elevation (STEMI) myocardial infarction involving left             anterior descending coronary artery         Summary:   1. Left ventricular ejection fraction, by visual estimation, is 35 to   40%.   2. Multiple left ventricular regional wall motion abnormalities exist.   See wall motion findings.   3. Mildly increased LV wall thickness.   4. Spectral Doppler shows impaired relaxation pattern of left   ventricular myocardial filling (Grade I diastolic dysfunction).   5. Trivial pericardial effusion.   6. Mild tricuspid regurgitation.   7. Mild aortic regurgitation.   8. Estimated pulmonary artery systolic pressure is 48.4 mmHg assuming a   right atrial pressure of 5 mmHg, which is consistent with mild pulmonary   hypertension.    PHYSICIAN INTERPRETATION:  Left Ventricle: The left ventricular internal cavity size is normal. Left   ventricular wall thickness is mildly increased. There is no left   ventricular hypertrophy. Left ventricular ejection fraction, by visual   estimation, is 35 to 40%. Spectral Doppler shows impaired relaxation   pattern of left ventricular myocardial filling (Grade I diastolic   dysfunction).       LV Wall Scoring:  The mid and apical anterior septum and apical anterior segment are   akinetic.  The basal and mid anterior wall, basal and mid inferior septum, and basal  anteroseptal segment are hypokinetic. All remaining scored segments are  normal.    Right Ventricle: Normal right ventricular size and function.  Left Atrium: Normal left atrial size.  Right Atrium: Normal right atrial size.  Pericardium: Trivial pericardial effusion is present.  Mitral Valve: Structurally normal mitral valve, with normal leaflet   excursion. No evidence of mitral valve regurgitation is seen.  Tricuspid Valve: Structurally normal tricuspid valve, with normal leaflet   excursion. Mild tricuspid regurgitation is visualized. Estimated   pulmonary artery systolic pressure is 48.4 mmHg assuming a right atrial   pressure of 5 mmHg, which is consistent with mild pulmonary hypertension.  Aortic Valve: Normal trileaflet aortic valve with normal opening. Mild   aortic valve regurgitation is seen.  Pulmonic Valve: Structurally normal pulmonic valve, with normal leaflet   excursion. No indication of pulmonic valve regurgitation.  Aorta: The aortic root and ascendingaorta are structurally normal, with   no evidence of dilitation.  Pulmonary Artery: The main pulmonary artery is normal in size.       2D AND M-MODE MEASUREMENTS (normal ranges within parentheses):  Left Ventricle:                  Normal   Aorta/Left Atrium:               Normal  IVSd (2D):              1.18 cm (0.7-1.1) AoV Cusp Separation: 1.85 cm   (1.5-2.6)  LVPWd (2D):             1.17 cm (0.7-1.1) Left Atrium (Mmode): 3.70 cm   (1.9-4.0)  LVIDd (2D):             4.74 cm (3.4-5.7)  LVIDs (2D):             2.99 cm  LV FS (2D):             36.9 %   (>25%)  Relative Wall Thickness  0.49    (<0.42)    SPECTRAL DOPPLER ANALYSIS:  LV DIASTOLIC FUNCTION:  MV Peak E: 0.82 m/s Decel Time: 172 msec  MV Peak A: 0.70 m/s  E/A Ratio: 1.17    Aortic Valve:  AoV VMax:    0.94 m/s AoV Area, Vmax: 2.68 cm² Vmax Indx: 1.32 cm²/m²  AoV Pk Grad: 3.5 mmHg    LVOT Vmax: 0.79 m/s  LVOT VTI:  0.13 m  LVOT Diam: 2.01 cm    Mitral Valve:  MV P1/2 Time: 49.84 msec  MV Area, PHT: 4.41 cm²    Tricuspid Valve and PA/RV Systolic Pressure: TR Max Velocity: 3.29 m/s RA   Pressure: 5 mmHg RVSP/PASP: 48.4 mmHg     [id number] David Estes M.D., Electronically signed on 1/16/2019 at 2:01:35   PM    LABS                        13.8   17.98 )-----------( 178      ( 17 Jan 2019 04:41 )             41.5     01-17    137  |  101  |  18  ----------------------------<  121<H>  4.2   |  19  |  0.8    Ca    8.4<L>      17 Jan 2019 04:41  Phos  2.6     01-16  Mg     1.8     01-17    TPro  6.7  /  Alb  3.3<L>  /  TBili  1.1  /  DBili  x   /  AST  267<H>  /  ALT  60<H>  /  AlkPhos  71  01-17    CARDIAC MARKERS ( 17 Jan 2019 04:41 )  x     / 2.83 ng/mL / 1893 U/L / x     / 163.2 ng/mL  CARDIAC MARKERS ( 16 Jan 2019 20:26 )  x     / 10.63 ng/mL / 5000 U/L / x     / >300.0 ng/mL  CARDIAC MARKERS ( 16 Jan 2019 11:50 )  x     / 5.94 ng/mL / 4791 U/L / x     / 573.6 ng/mL  CARDIAC MARKERS ( 16 Jan 2019 03:19 )  x     / 0.01 ng/mL / x     / x     / x          Magnesium, Serum: 1.8 mg/dL [1.8 - 2.4] (01-17-19 @ 04:41)  Magnesium, Serum: 1.9 mg/dL [1.8 - 2.4] (01-16-19 @ 11:29)  LIVER FUNCTIONS - ( 17 Jan 2019 04:41 )  Alb: 3.3 g/dL / Pro: 6.7 g/dL / ALK PHOS: 71 U/L / ALT: 60 U/L / AST: 267 U/L / GGT: x             A/P:  I discussed the case with Interventional Cardiologist Dr. Randhawa & recommend the following:    S/P STEMI - Anterior wall MI ( DANYELLE POBA, DANYELLE pLAD )    	     Continue DAPT (Brilinta 90 mg PO q 12 hr and Aspirin 81 mg PO daily),  B-Blocker, Statin Therapy                   Pt given instructions on importance of taking antiplatelet medication or risk acute stent thrombosis/death                   Post cath instructions, access site care and activity restrictions reviewed with patient                     - keep K = 4                   - keep Mg =2                   - maintain therapeutic PTT                   - monitor pulses ans IABP access site                   - trend H/H and CE

## 2019-01-17 NOTE — PROGRESS NOTE ADULT - SUBJECTIVE AND OBJECTIVE BOX
EVELYN MORALES 73y Male  MRN#: [id number]   CODE STATUS: FULL CODE       SUBJECTIVE  Overnight events - None    Subjective complaints - Pt complains of some nausea and difficulty breathing. No chest pain. BP on the low normal side. IABP in place.     OBJECTIVE  PAST MEDICAL & SURGICAL HISTORY  Prostate cancer  H/O prostatectomy                                            -----------------------------------------------------------  ALLERGIES:  No Known Allergies                                            ------------------------------------------------------------  MEDICATIONS:  STANDING MEDICATIONS  aspirin enteric coated 81 milliGRAM(s) Oral daily  atorvastatin 80 milliGRAM(s) Oral at bedtime  chlorhexidine 4% Liquid 1 Application(s) Topical <User Schedule>  heparin  Infusion 600 Unit(s)/Hr IV Continuous <Continuous>  metoprolol tartrate 50 milliGRAM(s) Oral two times a day  pantoprazole  Injectable 40 milliGRAM(s) IV Push every 12 hours  ticagrelor 90 milliGRAM(s) Oral two times a day    PRN MEDICATIONS  morphine  - Injectable 2 milliGRAM(s) IV Push every 4 hours PRN                                            ------------------------------------------------------------  VITAL SIGNS: Last 24 Hours  T(C): 37.3 (17 Jan 2019 08:00), Max: 37.3 (17 Jan 2019 08:00)  T(F): 99.2 (17 Jan 2019 08:00), Max: 99.2 (17 Jan 2019 08:00)  HR: 78 (17 Jan 2019 10:00) (66 - 88)  BP: 78/46 (17 Jan 2019 10:00) (78/46 - 121/66)  BP(mean): 63 (17 Jan 2019 10:00) (49 - 103)  RR: 18 (17 Jan 2019 10:00) (13 - 26)  SpO2: 99% (17 Jan 2019 10:00) (94% - 100%)      01-16-19 @ 07:01  -  01-17-19 @ 07:00  --------------------------------------------------------  IN: 1711 mL / OUT: 1550 mL / NET: 161 mL    01-17-19 @ 07:01  -  01-17-19 @ 10:52  --------------------------------------------------------  IN: 264 mL / OUT: 200 mL / NET: 64 mL                                             --------------------------------------------------------------  LABS:                        13.8   17.98 )-----------( 178      ( 17 Jan 2019 04:41 )             41.5     01-17    137  |  101  |  18  ----------------------------<  121<H>  4.2   |  19  |  0.8    Ca    8.4<L>      17 Jan 2019 04:41  Phos  2.6     01-16  Mg     1.8     01-17    TPro  6.7  /  Alb  3.3<L>  /  TBili  1.1  /  DBili  x   /  AST  267<H>  /  ALT  60<H>  /  AlkPhos  71  01-17    PT/INR - ( 16 Jan 2019 16:30 )   PT: 13.80 sec;   INR: 1.20 ratio      PTT - ( 17 Jan 2019 04:41 )  PTT:56.3 sec    Creatine Kinase, Serum: 1893 U/L <H> (01-17-19 @ 04:41)  Troponin T, Serum: 2.83 ng/mL <HH> (01-17-19 @ 04:41)  Creatine Kinase, Serum: 5000 U/L <H> (01-16-19 @ 20:26)  Troponin T, Serum: 10.63 ng/mL <HH> (01-16-19 @ 20:26)  Creatine Kinase, Serum: 4791 U/L <H> (01-16-19 @ 11:50)  Troponin T, Serum: 5.94 ng/mL <HH> (01-16-19 @ 11:50)    CARDIAC MARKERS ( 17 Jan 2019 04:41 )  x     / 2.83 ng/mL / 1893 U/L / x     / 163.2 ng/mL  CARDIAC MARKERS ( 16 Jan 2019 20:26 )  x     / 10.63 ng/mL / 5000 U/L / x     / >300.0 ng/mL  CARDIAC MARKERS ( 16 Jan 2019 11:50 )  x     / 5.94 ng/mL / 4791 U/L / x     / 573.6 ng/mL  CARDIAC MARKERS ( 16 Jan 2019 03:19 )  x     / 0.01 ng/mL / x     / x     / x                                                  -------------------------------------------------------------  RADIOLOGY:  < from: Xray Chest 1 View- PORTABLE-Routine (01.17.19 @ 05:59) >    Decreased airspace opacities.  Post intra-aortic balloon pump    < from: Transthoracic Echocardiogram (01.16.19 @ 08:54) >   1. Left ventricular ejection fraction, by visual estimation, is 35 to   40%.   2. Multiple left ventricular regional wall motion abnormalities exist.   See wall motion findings.   3. Mildly increased LV wall thickness.   4. Spectral Doppler shows impaired relaxation pattern of left   ventricular myocardial filling (Grade I diastolic dysfunction).   5. Trivial pericardial effusion.   6. Mild tricuspid regurgitation.   7. Mild aortic regurgitation.   8. Estimated pulmonary artery systolic pressure is 48.4 mmHg assuming a   right atrial pressure of 5 mmHg, which is consistent with mild pulmonary   hypertension.                                            --------------------------------------------------------------    PHYSICAL EXAM:  General: NAD, AAOx3  HEENT: NCAT, EOMI, PERRLA  Neck: supple, no JVD  CV: RRR, S1S2 nl, no murmurs   Lungs: CTA bilaterally, no wheeze, rales or rhonchi  Abdomen: soft, NT/ND, +BS  Extremities: ROM nl, no clubbing, cyanosis or edema. Right groin- some ecchymoses.                                            --------------------------------------------------------------    ASSESSMENT & PLAN    1. S/P STEMI for anterior wall MI  2. S/P Stent Thrombosis of LAD DANYELLE  3. 3 Vessel CAD by cardiac cath  4. Moderate LV systolic dysfunction  5 .S/P prostatectomy    Plan:     -S/P proximal LAD DANYELLE during LHC and then s/p LHC again for stent Thrombosis with successful recanalization of the proximal DANYELLE with POBA and placement of a DANYELLE in the distal LAD  -c/w Asa, brilinta, metoprolol. ACEi stopped d/t ODALIS (will start if Cr stable 48hrs after cath)  -c/w heparin gtt with PTT monitoring.   -Echo: EF 35-40%; Grade I diastolic dysfunction, trivial pericardial effusion, mild tricuspid regurgitation, mild aortic regurgitation.  -IABP in place (1:1 now)- will try to wean off today.   -IV Zofran prn for nausea.   -Monitor VS closely.     #DVT prophylaxis On Heparin gtt  #GI prophylaxis Protonix IV bid  #Diet DASH  #Activity IAT  #Code status FULL  #Disposition c/w CCU EVELYN MORALES 73y Male  MRN#: [id number]   CODE STATUS: FULL CODE       SUBJECTIVE  Overnight events - None    Subjective complaints - Pt complains of some nausea and difficulty breathing. No chest pain. BP on the low normal side. IABP in place.     OBJECTIVE  PAST MEDICAL & SURGICAL HISTORY  Prostate cancer  H/O prostatectomy                                            -----------------------------------------------------------  ALLERGIES:  No Known Allergies                                            ------------------------------------------------------------  MEDICATIONS:  STANDING MEDICATIONS  aspirin enteric coated 81 milliGRAM(s) Oral daily  atorvastatin 80 milliGRAM(s) Oral at bedtime  chlorhexidine 4% Liquid 1 Application(s) Topical <User Schedule>  heparin  Infusion 600 Unit(s)/Hr IV Continuous <Continuous>  metoprolol tartrate 50 milliGRAM(s) Oral two times a day  pantoprazole  Injectable 40 milliGRAM(s) IV Push every 12 hours  ticagrelor 90 milliGRAM(s) Oral two times a day    PRN MEDICATIONS  morphine  - Injectable 2 milliGRAM(s) IV Push every 4 hours PRN                                            ------------------------------------------------------------  VITAL SIGNS: Last 24 Hours  T(C): 37.3 (17 Jan 2019 08:00), Max: 37.3 (17 Jan 2019 08:00)  T(F): 99.2 (17 Jan 2019 08:00), Max: 99.2 (17 Jan 2019 08:00)  HR: 78 (17 Jan 2019 10:00) (66 - 88)  BP: 78/46 (17 Jan 2019 10:00) (78/46 - 121/66)  BP(mean): 63 (17 Jan 2019 10:00) (49 - 103)  RR: 18 (17 Jan 2019 10:00) (13 - 26)  SpO2: 99% (17 Jan 2019 10:00) (94% - 100%)      01-16-19 @ 07:01  -  01-17-19 @ 07:00  --------------------------------------------------------  IN: 1711 mL / OUT: 1550 mL / NET: 161 mL    01-17-19 @ 07:01  -  01-17-19 @ 10:52  --------------------------------------------------------  IN: 264 mL / OUT: 200 mL / NET: 64 mL                                             --------------------------------------------------------------  LABS:                        13.8   17.98 )-----------( 178      ( 17 Jan 2019 04:41 )             41.5     01-17    137  |  101  |  18  ----------------------------<  121<H>  4.2   |  19  |  0.8    Ca    8.4<L>      17 Jan 2019 04:41  Phos  2.6     01-16  Mg     1.8     01-17    TPro  6.7  /  Alb  3.3<L>  /  TBili  1.1  /  DBili  x   /  AST  267<H>  /  ALT  60<H>  /  AlkPhos  71  01-17    PT/INR - ( 16 Jan 2019 16:30 )   PT: 13.80 sec;   INR: 1.20 ratio      PTT - ( 17 Jan 2019 04:41 )  PTT:56.3 sec    Creatine Kinase, Serum: 1893 U/L <H> (01-17-19 @ 04:41)  Troponin T, Serum: 2.83 ng/mL <HH> (01-17-19 @ 04:41)  Creatine Kinase, Serum: 5000 U/L <H> (01-16-19 @ 20:26)  Troponin T, Serum: 10.63 ng/mL <HH> (01-16-19 @ 20:26)  Creatine Kinase, Serum: 4791 U/L <H> (01-16-19 @ 11:50)  Troponin T, Serum: 5.94 ng/mL <HH> (01-16-19 @ 11:50)    CARDIAC MARKERS ( 17 Jan 2019 04:41 )  x     / 2.83 ng/mL / 1893 U/L / x     / 163.2 ng/mL  CARDIAC MARKERS ( 16 Jan 2019 20:26 )  x     / 10.63 ng/mL / 5000 U/L / x     / >300.0 ng/mL  CARDIAC MARKERS ( 16 Jan 2019 11:50 )  x     / 5.94 ng/mL / 4791 U/L / x     / 573.6 ng/mL  CARDIAC MARKERS ( 16 Jan 2019 03:19 )  x     / 0.01 ng/mL / x     / x     / x                                                  -------------------------------------------------------------  RADIOLOGY:  < from: Xray Chest 1 View- PORTABLE-Routine (01.17.19 @ 05:59) >    Decreased airspace opacities.  Post intra-aortic balloon pump    < from: Transthoracic Echocardiogram (01.16.19 @ 08:54) >   1. Left ventricular ejection fraction, by visual estimation, is 35 to   40%.   2. Multiple left ventricular regional wall motion abnormalities exist.   See wall motion findings.   3. Mildly increased LV wall thickness.   4. Spectral Doppler shows impaired relaxation pattern of left   ventricular myocardial filling (Grade I diastolic dysfunction).   5. Trivial pericardial effusion.   6. Mild tricuspid regurgitation.   7. Mild aortic regurgitation.   8. Estimated pulmonary artery systolic pressure is 48.4 mmHg assuming a   right atrial pressure of 5 mmHg, which is consistent with mild pulmonary   hypertension.                                            --------------------------------------------------------------    PHYSICAL EXAM:  General: NAD, AAOx3  HEENT: NCAT, EOMI, PERRLA  Neck: supple, no JVD  CV: RRR, S1S2 nl, no murmurs   Lungs: CTA bilaterally, no wheeze, rales or rhonchi  Abdomen: soft, NT/ND, +BS  Extremities: ROM nl, no clubbing, cyanosis or edema. Right groin- some ecchymoses.                                            --------------------------------------------------------------    ASSESSMENT & PLAN    1. S/P STEMI for anterior wall MI  2. S/P Stent Thrombosis of LAD DANYELLE  3. 3 Vessel CAD by cardiac cath  4. 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 ACEi stopped d/t ODALIS (will start if Cr stable 48hrs after cath), May need to adjust metoprolol as pt had NSVT on tele, pt BP on lower side will monitor for now.  -c/w heparin gtt with PTT monitoring.   -Echo: EF 35-40%; Grade I diastolic dysfunction, trivial pericardial effusion, mild tricuspid regurgitation, mild aortic regurgitation. Will need follow up TTE in 30 days  -IABP in place (1:1 now)- will try to wean off today.   -IV Zofran prn for nausea.   -Monitor VS closely.     #DVT prophylaxis On Heparin gtt  #GI prophylaxis Protonix IV bid  #Diet DASH  #Activity IAT  #Code status FULL  #Disposition c/w CCU

## 2019-01-17 NOTE — PROGRESS NOTE ADULT - SUBJECTIVE AND OBJECTIVE BOX
No complaints: Day #2 in CCU for Acute AWMI-stemi.                                 UNEVENTFUL 24 HOURS. NO RECURRENT CHEST PAIN. NO sob. NO ARRYTHMIAS.                                  IABP in place. No evidence of bleeding or vascular comprimise      MEDICATIONS  (STANDING):  aspirin enteric coated 81 milliGRAM(s) Oral daily  atorvastatin 80 milliGRAM(s) Oral at bedtime  chlorhexidine 4% Liquid 1 Application(s) Topical <User Schedule>  heparin  Infusion 600 Unit(s)/Hr (12 mL/Hr) IV Continuous <Continuous>  metoprolol tartrate 50 milliGRAM(s) Oral two times a day  pantoprazole  Injectable 40 milliGRAM(s) IV Push every 12 hours  ticagrelor 90 milliGRAM(s) Oral two times a day      ROS:  CV: No chest pain, shortness of breath or palpitations    PHYSICAL EXAM:  Vital Signs Last 24 Hrs  T(C): 36.1 (17 Jan 2019 04:00), Max: 36.5 (16 Jan 2019 20:00)  T(F): 97 (17 Jan 2019 04:00), Max: 97.7 (16 Jan 2019 20:00)  HR: 86 (17 Jan 2019 07:00) (66 - 88)  BP: 83/47 (17 Jan 2019 07:00) (81/44 - 121/66)  BP(mean): 67 (17 Jan 2019 07:00) (58 - 103)  RR: 25 (17 Jan 2019 07:00) (13 - 26)  SpO2: 94% (17 Jan 2019 07:00) (94% - 100%)    General: NAD, AAOx3  HEENT: NCAT, EOMI, PERRLA  Neck: supple, no JVD  CV: RRR, S1S2 nl, no murmurs   Lungs: CTA bilaterally, no wheeze, rales or rhonchi  Abdomen: soft, NT/ND, +BS  Extremities: ROM nl, no clubbing, cyanosis or edema  Neurologic: Nonfocal                            13.8   17.98 )-----------( 178      ( 17 Jan 2019 04:41 )             41.5         01-17    137  |  101  |  18  ----------------------------<  121<H>  4.2   |  19  |  0.8    Ca    8.4<L>      17 Jan 2019 04:41  Phos  2.6     01-16  Mg     1.8     01-17    TPro  6.7  /  Alb  3.3<L>  /  TBili  1.1  /  DBili  x   /  AST  267<H>  /  ALT  60<H>  /  AlkPhos  71  01-17    CXR - No CHF    2D cardiac echo - ef of about 35% WITH MULTIPLE SEGMENTAL WALL CONTRACTION ABNORMALITIES.  PA PRESSURE OF APPROX. 45MM HG  EKG - Q'S IN ANTERIOR LEAD  Urine output is acceptable

## 2019-01-18 LAB
ANION GAP SERPL CALC-SCNC: 14 MMOL/L — SIGNIFICANT CHANGE UP (ref 7–14)
APTT BLD: 31.1 SEC — SIGNIFICANT CHANGE UP (ref 27–39.2)
BASOPHILS # BLD AUTO: 0.02 K/UL — SIGNIFICANT CHANGE UP (ref 0–0.2)
BASOPHILS NFR BLD AUTO: 0.2 % — SIGNIFICANT CHANGE UP (ref 0–1)
BUN SERPL-MCNC: 17 MG/DL — SIGNIFICANT CHANGE UP (ref 10–20)
CALCIUM SERPL-MCNC: 8.9 MG/DL — SIGNIFICANT CHANGE UP (ref 8.5–10.1)
CHLORIDE SERPL-SCNC: 100 MMOL/L — SIGNIFICANT CHANGE UP (ref 98–110)
CK MB CFR SERPL CALC: 17.6 NG/ML — HIGH (ref 0.6–6.3)
CK SERPL-CCNC: 668 U/L — HIGH (ref 0–225)
CO2 SERPL-SCNC: 24 MMOL/L — SIGNIFICANT CHANGE UP (ref 17–32)
CREAT SERPL-MCNC: 1.2 MG/DL — SIGNIFICANT CHANGE UP (ref 0.7–1.5)
EOSINOPHIL # BLD AUTO: 0.04 K/UL — SIGNIFICANT CHANGE UP (ref 0–0.7)
EOSINOPHIL NFR BLD AUTO: 0.3 % — SIGNIFICANT CHANGE UP (ref 0–8)
GLUCOSE SERPL-MCNC: 120 MG/DL — HIGH (ref 70–99)
HCT VFR BLD CALC: 37.4 % — LOW (ref 42–52)
HGB BLD-MCNC: 12.4 G/DL — LOW (ref 14–18)
IMM GRANULOCYTES NFR BLD AUTO: 0.5 % — HIGH (ref 0.1–0.3)
LYMPHOCYTES # BLD AUTO: 1.39 K/UL — SIGNIFICANT CHANGE UP (ref 1.2–3.4)
LYMPHOCYTES # BLD AUTO: 10.6 % — LOW (ref 20.5–51.1)
MAGNESIUM SERPL-MCNC: 1.8 MG/DL — SIGNIFICANT CHANGE UP (ref 1.8–2.4)
MCHC RBC-ENTMCNC: 28.2 PG — SIGNIFICANT CHANGE UP (ref 27–31)
MCHC RBC-ENTMCNC: 33.2 G/DL — SIGNIFICANT CHANGE UP (ref 32–37)
MCV RBC AUTO: 85 FL — SIGNIFICANT CHANGE UP (ref 80–94)
MONOCYTES # BLD AUTO: 1.18 K/UL — HIGH (ref 0.1–0.6)
MONOCYTES NFR BLD AUTO: 9 % — SIGNIFICANT CHANGE UP (ref 1.7–9.3)
NEUTROPHILS # BLD AUTO: 10.44 K/UL — HIGH (ref 1.4–6.5)
NEUTROPHILS NFR BLD AUTO: 79.4 % — HIGH (ref 42.2–75.2)
NRBC # BLD: 0 /100 WBCS — SIGNIFICANT CHANGE UP (ref 0–0)
PHOSPHATE SERPL-MCNC: 2 MG/DL — LOW (ref 2.1–4.9)
PLATELET # BLD AUTO: 146 K/UL — SIGNIFICANT CHANGE UP (ref 130–400)
POTASSIUM SERPL-MCNC: 4.4 MMOL/L — SIGNIFICANT CHANGE UP (ref 3.5–5)
POTASSIUM SERPL-SCNC: 4.4 MMOL/L — SIGNIFICANT CHANGE UP (ref 3.5–5)
RBC # BLD: 4.4 M/UL — LOW (ref 4.7–6.1)
RBC # FLD: 13 % — SIGNIFICANT CHANGE UP (ref 11.5–14.5)
SODIUM SERPL-SCNC: 138 MMOL/L — SIGNIFICANT CHANGE UP (ref 135–146)
TROPONIN T SERPL-MCNC: 4.48 NG/ML — CRITICAL HIGH
WBC # BLD: 13.13 K/UL — HIGH (ref 4.8–10.8)
WBC # FLD AUTO: 13.13 K/UL — HIGH (ref 4.8–10.8)

## 2019-01-18 RX ORDER — HEPARIN SODIUM 5000 [USP'U]/ML
5000 INJECTION INTRAVENOUS; SUBCUTANEOUS EVERY 8 HOURS
Qty: 0 | Refills: 0 | Status: DISCONTINUED | OUTPATIENT
Start: 2019-01-18 | End: 2019-01-20

## 2019-01-18 RX ORDER — METOPROLOL TARTRATE 50 MG
25 TABLET ORAL EVERY 6 HOURS
Qty: 0 | Refills: 0 | Status: DISCONTINUED | OUTPATIENT
Start: 2019-01-18 | End: 2019-01-19

## 2019-01-18 RX ADMIN — PANTOPRAZOLE SODIUM 40 MILLIGRAM(S): 20 TABLET, DELAYED RELEASE ORAL at 18:11

## 2019-01-18 RX ADMIN — HEPARIN SODIUM 5000 UNIT(S): 5000 INJECTION INTRAVENOUS; SUBCUTANEOUS at 21:30

## 2019-01-18 RX ADMIN — TICAGRELOR 90 MILLIGRAM(S): 90 TABLET ORAL at 05:30

## 2019-01-18 RX ADMIN — Medication 81 MILLIGRAM(S): at 12:38

## 2019-01-18 RX ADMIN — CHLORHEXIDINE GLUCONATE 1 APPLICATION(S): 213 SOLUTION TOPICAL at 05:30

## 2019-01-18 RX ADMIN — ATORVASTATIN CALCIUM 80 MILLIGRAM(S): 80 TABLET, FILM COATED ORAL at 21:29

## 2019-01-18 RX ADMIN — Medication 25 MILLIGRAM(S): at 12:38

## 2019-01-18 RX ADMIN — TICAGRELOR 90 MILLIGRAM(S): 90 TABLET ORAL at 18:11

## 2019-01-18 RX ADMIN — Medication 25 MILLIGRAM(S): at 18:11

## 2019-01-18 RX ADMIN — Medication 50 MILLIGRAM(S): at 05:30

## 2019-01-18 RX ADMIN — HEPARIN SODIUM 5000 UNIT(S): 5000 INJECTION INTRAVENOUS; SUBCUTANEOUS at 14:53

## 2019-01-18 NOTE — PROGRESS NOTE ADULT - SUBJECTIVE AND OBJECTIVE BOX
No complaints Uneventful 24 hours. No recurrent chest pain. No evidence of CHF. Intra-aortic balloon pum removed. No hematoma. No evidence of vascular complications.    Telemetry: in CCU    MEDICATIONS  (STANDING):  aspirin enteric coated 81 milliGRAM(s) Oral daily  atorvastatin 80 milliGRAM(s) Oral at bedtime  chlorhexidine 4% Liquid 1 Application(s) Topical <User Schedule>  heparin  Injectable 5000 Unit(s) SubCutaneous every 8 hours  metoprolol tartrate 25 milliGRAM(s) Oral every 6 hours  pantoprazole  Injectable 40 milliGRAM(s) IV Push every 12 hours  ticagrelor 90 milliGRAM(s) Oral two times a day      ROS:  CV: No chest pain, shortness of breath or palpitations    PHYSICAL EXAM:  Vital Signs Last 24 Hrs  T(C): 37.2 (18 Jan 2019 08:00), Max: 37.2 (17 Jan 2019 16:00)  T(F): 98.9 (18 Jan 2019 08:00), Max: 99 (17 Jan 2019 16:00)  HR: 90 (18 Jan 2019 08:00) (76 - 98)  BP: 100/66 (18 Jan 2019 08:00) (78/46 - 124/74)  BP(mean): 74 (18 Jan 2019 08:00) (57 - 102)  RR: 18 (18 Jan 2019 08:00) (12 - 28)  SpO2: 95% (18 Jan 2019 08:00) (95% - 100%)    General: NAD, AAOx3  HEENT: NCAT, EOMI, PERRLA  Neck: supple, no JVD  CV: RRR, S1S2 nl, no murmurs   Lungs: CTA bilaterally, no wheeze, rales or rhonchi  Abdomen: soft, NT/ND, +BS  Extremities: ROM nl, no clubbing, cyanosis or edema  Neurologic: Nonfocal                            12.4   13.13 )-----------( 146      ( 18 Jan 2019 05:27 )             37.4         01-18    138  |  100  |  17  ----------------------------<  120<H>  4.4   |  24  |  1.2    Ca    8.9      18 Jan 2019 05:27  Phos  2.0     01-18  Mg     1.8     01-18    TPro  6.7  /  Alb  3.3<L>  /  TBili  1.1  /  DBili  x   /  AST  267<H>  /  ALT  60<H>  /  AlkPhos  71  01-17

## 2019-01-18 NOTE — PROGRESS NOTE ADULT - ATTENDING COMMENTS
Pt seen and examined at bedside independently.   Pt BP has improved. MARGARET rodriguez fellow to consider starting ACE/ARB. Given risk of MICAELA holding for now.  Pt Combined CHF- will benefit from low dose lasix but pt currently not in exacerbation  CCU team to check on brilinta co pay  PT, ambulate pt   Off IABP   Continue plan as above.

## 2019-01-18 NOTE — PROGRESS NOTE ADULT - ASSESSMENT
S/P PCI for AWMI  Stent thrombosis of Ostial LAD  Lv systolic dysfunction    Plan: OOB to chair  continue same meds

## 2019-01-18 NOTE — PROGRESS NOTE ADULT - SUBJECTIVE AND OBJECTIVE BOX
EVELYN MORALES 73y Male  MRN#: [id number]   CODE STATUS: FULL CODE      SUBJECTIVE  Overnight events - IABP removed last evening. Bp has been stable since.     Subjective complaints - Some shortness of breath. No chest pain. No dizziness/ lightheadedness     OBJECTIVE  PAST MEDICAL & SURGICAL HISTORY  Prostate cancer  H/O prostatectomy                                            -----------------------------------------------------------  ALLERGIES:  No Known Allergies                                            ------------------------------------------------------------  MEDICATIONS:  STANDING MEDICATIONS  aspirin enteric coated 81 milliGRAM(s) Oral daily  atorvastatin 80 milliGRAM(s) Oral at bedtime  chlorhexidine 4% Liquid 1 Application(s) Topical <User Schedule>  heparin  Injectable 5000 Unit(s) SubCutaneous every 8 hours  metoprolol tartrate 25 milliGRAM(s) Oral every 6 hours  pantoprazole  Injectable 40 milliGRAM(s) IV Push every 12 hours  ticagrelor 90 milliGRAM(s) Oral two times a day    PRN MEDICATIONS  morphine  - Injectable 2 milliGRAM(s) IV Push every 4 hours PRN                                            ------------------------------------------------------------  VITAL SIGNS: Last 24 Hours  T(C): 37.2 (18 Jan 2019 08:00), Max: 37.2 (17 Jan 2019 16:00)  T(F): 98.9 (18 Jan 2019 08:00), Max: 99 (17 Jan 2019 16:00)  HR: 82 (18 Jan 2019 10:00) (78 - 98)  BP: 113/63 (18 Jan 2019 10:00) (82/46 - 124/74)  BP(mean): 75 (18 Jan 2019 10:00) (60 - 102)  RR: 15 (18 Jan 2019 10:00) (12 - 28)  SpO2: 100% (18 Jan 2019 10:00) (95% - 100%)      01-17-19 @ 07:01  -  01-18-19 @ 07:00  --------------------------------------------------------  IN: 936 mL / OUT: 1900 mL / NET: -964 mL    01-18-19 @ 07:01  -  01-18-19 @ 13:34  --------------------------------------------------------  IN: 360 mL / OUT: 650 mL / NET: -290 mL                                           --------------------------------------------------------------  LABS:                        12.4   13.13 )-----------( 146      ( 18 Jan 2019 05:27 )             37.4     01-18    138  |  100  |  17  ----------------------------<  120<H>  4.4   |  24  |  1.2    Ca    8.9      18 Jan 2019 05:27  Phos  2.0     01-18  Mg     1.8     01-18    TPro  6.7  /  Alb  3.3<L>  /  TBili  1.1  /  DBili  x   /  AST  267<H>  /  ALT  60<H>  /  AlkPhos  71  01-17    PT/INR - ( 16 Jan 2019 16:30 )   PT: 13.80 sec;   INR: 1.20 ratio      PTT - ( 18 Jan 2019 05:27 )  PTT:31.1 sec    Creatine Kinase, Serum: 668 U/L <H> (01-18-19 @ 05:27)  Troponin T, Serum: 4.48 ng/mL <HH> (01-18-19 @ 05:27)    CARDIAC MARKERS ( 18 Jan 2019 05:27 )  x     / 4.48 ng/mL / 668 U/L / x     / 17.6 ng/mL  CARDIAC MARKERS ( 17 Jan 2019 04:41 )  x     / 2.83 ng/mL / 1893 U/L / x     / 163.2 ng/mL  CARDIAC MARKERS ( 16 Jan 2019 20:26 )  x     / 10.63 ng/mL / 5000 U/L / x     / >300.0 ng/mL                                              -------------------------------------------------------------  RADIOLOGY:  < from: Xray Chest 1 View- PORTABLE-Routine (01.18.19 @ 06:10) >    No radiographic evidence of acute cardiopulmonary disease.                                              --------------------------------------------------------------    PHYSICAL EXAM:                                             --------------------------------------------------------------    ASSESSMENT & PLAN                                           -------------------------------------------------------- EVELYN MORALES 73y Male  MRN#: [id number]   CODE STATUS: FULL CODE      SUBJECTIVE  Overnight events - IABP removed last evening. Bp has been stable since.     Subjective complaints - Some shortness of breath. No chest pain. No dizziness/ lightheadedness     OBJECTIVE  PAST MEDICAL & SURGICAL HISTORY  Prostate cancer  H/O prostatectomy                                            -----------------------------------------------------------  ALLERGIES:  No Known Allergies                                            ------------------------------------------------------------  MEDICATIONS:  STANDING MEDICATIONS  aspirin enteric coated 81 milliGRAM(s) Oral daily  atorvastatin 80 milliGRAM(s) Oral at bedtime  chlorhexidine 4% Liquid 1 Application(s) Topical <User Schedule>  heparin  Injectable 5000 Unit(s) SubCutaneous every 8 hours  metoprolol tartrate 25 milliGRAM(s) Oral every 6 hours  pantoprazole  Injectable 40 milliGRAM(s) IV Push every 12 hours  ticagrelor 90 milliGRAM(s) Oral two times a day    PRN MEDICATIONS  morphine  - Injectable 2 milliGRAM(s) IV Push every 4 hours PRN                                            ------------------------------------------------------------  VITAL SIGNS: Last 24 Hours  T(C): 37.2 (18 Jan 2019 08:00), Max: 37.2 (17 Jan 2019 16:00)  T(F): 98.9 (18 Jan 2019 08:00), Max: 99 (17 Jan 2019 16:00)  HR: 82 (18 Jan 2019 10:00) (78 - 98)  BP: 113/63 (18 Jan 2019 10:00) (82/46 - 124/74)  BP(mean): 75 (18 Jan 2019 10:00) (60 - 102)  RR: 15 (18 Jan 2019 10:00) (12 - 28)  SpO2: 100% (18 Jan 2019 10:00) (95% - 100%)      01-17-19 @ 07:01  -  01-18-19 @ 07:00  --------------------------------------------------------  IN: 936 mL / OUT: 1900 mL / NET: -964 mL    01-18-19 @ 07:01  -  01-18-19 @ 13:34  --------------------------------------------------------  IN: 360 mL / OUT: 650 mL / NET: -290 mL                                           --------------------------------------------------------------  LABS:                        12.4   13.13 )-----------( 146      ( 18 Jan 2019 05:27 )             37.4     01-18    138  |  100  |  17  ----------------------------<  120<H>  4.4   |  24  |  1.2    Ca    8.9      18 Jan 2019 05:27  Phos  2.0     01-18  Mg     1.8     01-18    TPro  6.7  /  Alb  3.3<L>  /  TBili  1.1  /  DBili  x   /  AST  267<H>  /  ALT  60<H>  /  AlkPhos  71  01-17    PT/INR - ( 16 Jan 2019 16:30 )   PT: 13.80 sec;   INR: 1.20 ratio      PTT - ( 18 Jan 2019 05:27 )  PTT:31.1 sec    Creatine Kinase, Serum: 668 U/L <H> (01-18-19 @ 05:27)  Troponin T, Serum: 4.48 ng/mL <HH> (01-18-19 @ 05:27)    CARDIAC MARKERS ( 18 Jan 2019 05:27 )  x     / 4.48 ng/mL / 668 U/L / x     / 17.6 ng/mL  CARDIAC MARKERS ( 17 Jan 2019 04:41 )  x     / 2.83 ng/mL / 1893 U/L / x     / 163.2 ng/mL  CARDIAC MARKERS ( 16 Jan 2019 20:26 )  x     / 10.63 ng/mL / 5000 U/L / x     / >300.0 ng/mL                                              -------------------------------------------------------------  RADIOLOGY:  < from: Xray Chest 1 View- PORTABLE-Routine (01.18.19 @ 06:10) >    No radiographic evidence of acute cardiopulmonary disease.     from: Transthoracic Echocardiogram (01.16.19 @ 08:54) >   1. Left ventricular ejection fraction, by visual estimation, is 35 to   40%.   2. Multiple left ventricular regional wall motion abnormalities exist.   See wall motion findings.   3. Mildly increased LV wall thickness.   4. Spectral Doppler shows impaired relaxation pattern of left   ventricular myocardial filling (Grade I diastolic dysfunction).   5. Trivial pericardial effusion.   6. Mild tricuspid regurgitation.   7. Mild aortic regurgitation.   8. Estimated pulmonary artery systolic pressure is 48.4 mmHg assuming a   right atrial pressure of 5 mmHg, which is consistent with mild pulmonary   hypertension.                                          --------------------------------------------------------------    PHYSICAL EXAM:  General: NAD, AAOx3  HEENT: NCAT, EOMI, PERRLA  Neck: supple, no JVD  CV: RRR, S1S2 nl, no murmurs   Lungs: CTA bilaterally, no wheeze, rales or rhonchi  Abdomen: soft, NT/ND, +BS  Extremities: ROM nl, no clubbing, cyanosis or edema. Right groin- some ecchymoses.                                           --------------------------------------------------------------    ASSESSMENT & PLAN    1. S/P STEMI for anterior wall MI  2. S/P Stent Thrombosis of LAD DANYELLE  3. 3 Vessel CAD by cardiac cath  4. 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 ACEi stopped d/t ODALIS (will start if Cr stable 48hrs after cath), May need to adjust metoprolol as pt had NSVT on tele, pt BP on lower side will monitor for now.  -c/w heparin gtt with PTT monitoring.   -Echo: EF 35-40%; Grade I diastolic dysfunction, trivial pericardial effusion, mild tricuspid regurgitation, mild aortic regurgitation. Will need follow up TTE in 30 days  -IABP in place (1:1 now)- will try to wean off today.   -IV Zofran prn for nausea.   -Monitor VS closely.     #DVT prophylaxis On Heparin gtt  #GI prophylaxis Protonix IV bid  #Diet DASH  #Activity IAT  #Code status FULL  #Disposition c/w CCU                                         -------------------------------------------------------- EVELYN MORALES 73y Male  MRN#: [id number]   CODE STATUS: FULL CODE      SUBJECTIVE  Overnight events - IABP removed last evening. Bp has been stable since.     Subjective complaints - Some shortness of breath. No chest pain. No dizziness/ lightheadedness     OBJECTIVE  PAST MEDICAL & SURGICAL HISTORY  Prostate cancer  H/O prostatectomy                                            -----------------------------------------------------------  ALLERGIES:  No Known Allergies                                            ------------------------------------------------------------  MEDICATIONS:  STANDING MEDICATIONS  aspirin enteric coated 81 milliGRAM(s) Oral daily  atorvastatin 80 milliGRAM(s) Oral at bedtime  chlorhexidine 4% Liquid 1 Application(s) Topical <User Schedule>  heparin  Injectable 5000 Unit(s) SubCutaneous every 8 hours  metoprolol tartrate 25 milliGRAM(s) Oral every 6 hours  pantoprazole  Injectable 40 milliGRAM(s) IV Push every 12 hours  ticagrelor 90 milliGRAM(s) Oral two times a day    PRN MEDICATIONS  morphine  - Injectable 2 milliGRAM(s) IV Push every 4 hours PRN                                            ------------------------------------------------------------  VITAL SIGNS: Last 24 Hours  T(C): 37.2 (18 Jan 2019 08:00), Max: 37.2 (17 Jan 2019 16:00)  T(F): 98.9 (18 Jan 2019 08:00), Max: 99 (17 Jan 2019 16:00)  HR: 82 (18 Jan 2019 10:00) (78 - 98)  BP: 113/63 (18 Jan 2019 10:00) (82/46 - 124/74)  BP(mean): 75 (18 Jan 2019 10:00) (60 - 102)  RR: 15 (18 Jan 2019 10:00) (12 - 28)  SpO2: 100% (18 Jan 2019 10:00) (95% - 100%)      01-17-19 @ 07:01  -  01-18-19 @ 07:00  --------------------------------------------------------  IN: 936 mL / OUT: 1900 mL / NET: -964 mL    01-18-19 @ 07:01  -  01-18-19 @ 13:34  --------------------------------------------------------  IN: 360 mL / OUT: 650 mL / NET: -290 mL                                           --------------------------------------------------------------  LABS:                        12.4   13.13 )-----------( 146      ( 18 Jan 2019 05:27 )             37.4     01-18    138  |  100  |  17  ----------------------------<  120<H>  4.4   |  24  |  1.2    Ca    8.9      18 Jan 2019 05:27  Phos  2.0     01-18  Mg     1.8     01-18    TPro  6.7  /  Alb  3.3<L>  /  TBili  1.1  /  DBili  x   /  AST  267<H>  /  ALT  60<H>  /  AlkPhos  71  01-17    PT/INR - ( 16 Jan 2019 16:30 )   PT: 13.80 sec;   INR: 1.20 ratio      PTT - ( 18 Jan 2019 05:27 )  PTT:31.1 sec    Creatine Kinase, Serum: 668 U/L <H> (01-18-19 @ 05:27)  Troponin T, Serum: 4.48 ng/mL <HH> (01-18-19 @ 05:27)    CARDIAC MARKERS ( 18 Jan 2019 05:27 )  x     / 4.48 ng/mL / 668 U/L / x     / 17.6 ng/mL  CARDIAC MARKERS ( 17 Jan 2019 04:41 )  x     / 2.83 ng/mL / 1893 U/L / x     / 163.2 ng/mL  CARDIAC MARKERS ( 16 Jan 2019 20:26 )  x     / 10.63 ng/mL / 5000 U/L / x     / >300.0 ng/mL                                              -------------------------------------------------------------  RADIOLOGY:  < from: Xray Chest 1 View- PORTABLE-Routine (01.18.19 @ 06:10) >    No radiographic evidence of acute cardiopulmonary disease.     from: Transthoracic Echocardiogram (01.16.19 @ 08:54) >   1. Left ventricular ejection fraction, by visual estimation, is 35 to   40%.   2. Multiple left ventricular regional wall motion abnormalities exist.   See wall motion findings.   3. Mildly increased LV wall thickness.   4. Spectral Doppler shows impaired relaxation pattern of left   ventricular myocardial filling (Grade I diastolic dysfunction).   5. Trivial pericardial effusion.   6. Mild tricuspid regurgitation.   7. Mild aortic regurgitation.   8. Estimated pulmonary artery systolic pressure is 48.4 mmHg assuming a   right atrial pressure of 5 mmHg, which is consistent with mild pulmonary   hypertension.                                          --------------------------------------------------------------    PHYSICAL EXAM:  General: NAD, AAOx3  HEENT: NCAT, EOMI, PERRLA  Neck: supple, no JVD  CV: RRR, S1S2 nl, no murmurs   Lungs: CTA bilaterally, no wheeze, rales or rhonchi  Abdomen: soft, NT/ND, +BS  Extremities: ROM nl, no clubbing, cyanosis or edema. Right groin- some ecchymoses.                                           --------------------------------------------------------------    ASSESSMENT & PLAN    1. S/P STEMI for anterior wall MI  2. S/P Stent Thrombosis of LAD DANYELLE  3. 3 Vessel CAD by cardiac cath  4. 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 ACEi stopped d/t ODALIS  -Echo: EF 35-40%; Grade I diastolic dysfunction, trivial pericardial effusion, mild tricuspid regurgitation, mild aortic regurgitation. Repeat Echo today.  -Off IABP. BP stable. Continue to monitor.  -IV Zofran prn for nausea.   -Aggressive risk factor modification,  diet counseling, smoking cessation discussed with patient          #DVT prophylaxis On Heparin gtt  #GI prophylaxis Protonix IV bid  #Diet DASH  #Activity IAT- OOB to chair  #Code status FULL  #Disposition c/w CCU                                         --------------------------------------------------------

## 2019-01-18 NOTE — PROGRESS NOTE ADULT - SUBJECTIVE AND OBJECTIVE BOX
Cardiology Follow up    EVELYN MORALES   73yMale  PAST MEDICAL & SURGICAL HISTORY:  Prostate cancer  H/O prostatectomy    Allergies    No Known Allergies    Intolerances        Patient reports intermittent SOB,   Denies CP, palpitations, or dizziness  No events on telemetry overnight    Vital Signs Last 24 Hrs  T(C): 37.2 (18 Jan 2019 08:00), Max: 37.2 (17 Jan 2019 16:00)  T(F): 98.9 (18 Jan 2019 08:00), Max: 99 (17 Jan 2019 16:00)  HR: 82 (18 Jan 2019 10:00) (78 - 98)  BP: 113/63 (18 Jan 2019 10:00) (82/46 - 124/74)  BP(mean): 75 (18 Jan 2019 10:00) (57 - 102)  RR: 15 (18 Jan 2019 10:00) (12 - 28)  SpO2: 100% (18 Jan 2019 10:00) (95% - 100%)Allergies    REVIEW OF SYSTEMS:    CONSTITUTIONAL: No weakness, fevers or chills  EYES/ENT: No visual changes;  No vertigo or throat pain   NECK: No pain or stiffness  RESPIRATORY: No cough, wheezing, hemoptysis; No shortness of breath  CARDIOVASCULAR: No chest pain or palpitations  GASTROINTESTINAL: No abdominal or epigastric pain. No nausea, vomiting, or hematemesis; No diarrhea or constipation. No melena or hematochezia.  GENITOURINARY: No dysuria, frequency or hematuria  NEUROLOGICAL: No numbness or weakness  SKIN: No itching, rashes        NAD, appears well  S1S2, no murmurs, no JVD  CTA B/L, no wheeze, no rales  SNT +BS  Ext:    Right Groin:  NO hematoma or bleeding, + ecchymosis + pulses C/D/I   Pulses:  +Rad/ +PTs /+DPs/ same as baseline  A&Ox 3    EKG            Ventricular Rate 78 BPM    Atrial Rate 78 BPM    P-R Interval 150 ms    QRS Duration 64 ms    Q-T Interval 406 ms    QTC Calculation(Bezet) 462 ms    P Axis 69 degrees    R Axis 77 degrees    T Axis 103 degrees    Diagnosis Line Normal sinus rhythm  Possible Left atrial enlargement  Low voltage QRS  Anterolateral infarct , age undetermined  Abnormal ECG    Confirmed by JONELLE ALBARRAN MD ([id number]) on 1/18/2019 9:13:25 AM                                                                                                          2D ECHO repeat echo s/p IABP removal Pending       1/16 ECHOEXAM:  2-D ECHO (TTE) COMPLETE        PROCEDURE DATE:  01/16/2019      INTERPRETATION:  REPORT:    TRANSTHORACIC ECHOCARDIOGRAM REPORT         Patient Name:   EVELYN MORALES Accession #: [id number]  Medical Rec #:  [id number]     Height:      68.0 in 172.7 cm  YOB: 1945      Weight:      198.0 lb 89.81 kg  Patient Age:    73 years      BSA:         2.04 m²  Patient Gender: M             BP:          130/74 mmHg       Date of Exam:        1/16/2019 8:54:24 AM  Referring Physician: [id number] RONALD LADD  Sonographer:         PHOEBE  Fellow:              Soham Capps, , ,  Reading Physician:   David Junior M.D.    Procedure: 2D Echo/Doppler/Color Doppler Complete.  Diagnosis: I21.02 - elevation (STEMI) myocardial infarction involving left             anterior descending coronary artery         Summary:   1. Left ventricular ejection fraction, by visual estimation, is 35 to   40%.   2. Multiple left ventricular regional wall motion abnormalities exist.   See wall motion findings.   3. Mildly increased LV wall thickness.   4. Spectral Doppler shows impaired relaxation pattern of left   ventricular myocardial filling (Grade I diastolic dysfunction).   5. Trivial pericardial effusion.   6. Mild tricuspid regurgitation.   7. Mild aortic regurgitation.   8. Estimated pulmonary artery systolic pressure is 48.4 mmHg assuming a   right atrial pressure of 5 mmHg, which is consistent with mild pulmonary   hypertension.    PHYSICIAN INTERPRETATION:  Left Ventricle: The left ventricular internal cavity size is normal. Left   ventricular wall thickness is mildly increased. There is no left   ventricular hypertrophy. Left ventricular ejection fraction, by visual   estimation, is 35 to 40%. Spectral Doppler shows impaired relaxation   pattern of left ventricular myocardial filling (Grade I diastolic   dysfunction).       LV Wall Scoring:  The mid and apical anterior septum and apical anterior segment are   akinetic.  The basal and mid anterior wall, basal and mid inferior septum, and basal  anteroseptal segment are hypokinetic. All remaining scored segments are  normal.    Right Ventricle: Normal right ventricular size and function.  Left Atrium: Normal left atrial size.  Right Atrium: Normal right atrial size.  Pericardium: Trivial pericardial effusion is present.  Mitral Valve: Structurally normal mitral valve, with normal leaflet   excursion. No evidence of mitral valve regurgitation is seen.  Tricuspid Valve: Structurally normal tricuspid valve, with normal leaflet   excursion. Mild tricuspid regurgitation is visualized. Estimated   pulmonary artery systolic pressure is 48.4 mmHg assuming a right atrial   pressure of 5 mmHg, which is consistent with mild pulmonary hypertension.  Aortic Valve: Normal trileaflet aortic valve with normal opening. Mild   aortic valve regurgitation is seen.  Pulmonic Valve: Structurally normal pulmonic valve, with normal leaflet   excursion. No indication of pulmonic valve regurgitation.  Aorta: The aortic root and ascendingaorta are structurally normal, with   no evidence of dilitation.  Pulmonary Artery: The main pulmonary artery is normal in size.       2D AND M-MODE MEASUREMENTS (normal ranges within parentheses):  Left Ventricle:                  Normal   Aorta/Left Atrium:               Normal  IVSd (2D):              1.18 cm (0.7-1.1) AoV Cusp Separation: 1.85 cm   (1.5-2.6)  LVPWd (2D):             1.17 cm (0.7-1.1) Left Atrium (Mmode): 3.70 cm   (1.9-4.0)  LVIDd (2D):             4.74 cm (3.4-5.7)  LVIDs (2D):             2.99 cm  LV FS (2D):             36.9 %   (>25%)  Relative Wall Thickness  0.49    (<0.42)    SPECTRAL DOPPLER ANALYSIS:  LV DIASTOLIC FUNCTION:  MV Peak E: 0.82 m/s Decel Time: 172 msec  MV Peak A: 0.70 m/s  E/A Ratio: 1.17    Aortic Valve:  AoV VMax:    0.94 m/s AoV Area, Vmax: 2.68 cm² Vmax Indx: 1.32 cm²/m²  AoV Pk Grad: 3.5 mmHg    LVOT Vmax: 0.79 m/s  LVOT VTI:  0.13 m  LVOT Diam: 2.01 cm    Mitral Valve:  MV P1/2 Time: 49.84 msec  MV Area, PHT: 4.41 cm²    Tricuspid Valve and PA/RV Systolic Pressure: TR Max Velocity: 3.29 m/s RA   Pressure: 5 mmHg RVSP/PASP: 48.4 mmHg       [id number] David Junior M.D., Electronically signed on 1/16/2019 at 2:01:35   PM              *** Final ***                    DAVID JUNIOR MD  This document has been electronically signed. Jan 16 2019  8:54AM                  LABS                        12.4   13.13 )-----------( 146      ( 18 Jan 2019 05:27 )             37.4     01-18    138  |  100  |  17  ----------------------------<  120<H>  4.4   |  24  |  1.2    Ca    8.9      18 Jan 2019 05:27  Phos  2.0     01-18  Mg     1.8     01-18    TPro  6.7  /  Alb  3.3<L>  /  TBili  1.1  /  DBili  x   /  AST  267<H>  /  ALT  60<H>  /  AlkPhos  71  01-17    CARDIAC MARKERS ( 18 Jan 2019 05:27 )  x     / 4.48 ng/mL / 668 U/L / x     / 17.6 ng/mL  CARDIAC MARKERS ( 17 Jan 2019 04:41 )  x     / 2.83 ng/mL / 1893 U/L / x     / 163.2 ng/mL  CARDIAC MARKERS ( 16 Jan 2019 20:26 )  x     / 10.63 ng/mL / 5000 U/L / x     / >300.0 ng/mL      Magnesium, Serum: 1.8 mg/dL [1.8 - 2.4] (01-18-19 @ 05:27)  LIVER FUNCTIONS - ( 17 Jan 2019 04:41 )  Alb: 3.3 g/dL / Pro: 6.7 g/dL / ALK PHOS: 71 U/L / ALT: 60 U/L / AST: 267 U/L / GGT: x             A/P:  I discussed the case with Interventional Cardiologist Dr. Randhawa  & recommends the following:    STEMI/ S/P PCI/ stent thrombosis ostial LAD  	         Continue DAPT,(asa 81mg daily, brilinta 90mg q12)  B-Blocker, Statin Therapy, no ace due to borderline BP                    brilinta not covered by insurance , $400 copay, will possibly switch to effient upon d/c,                     oob-ch, ambulate with assistance                     monitor access site, trend H/H, s/p IABP removal yesterday                    monitor lytes, trend Cr                   f/u repeat echo results                    Pt given instructions on importance of taking antiplatelet medication or risk acute stent thrombosis/death                   Post cath instructions, access site care and activity restrictions reviewed with patient                     Discussed with patient to return to hospital if experience chest pain, shortness breath, dizziness and site bleeding                   Aggressive risk factor modification,  diet counseling, smoking cessation discussed with patient                      f/u with Dr. Randhawa

## 2019-01-19 ENCOUNTER — TRANSCRIPTION ENCOUNTER (OUTPATIENT)
Age: 74
End: 2019-01-19

## 2019-01-19 LAB
ANION GAP SERPL CALC-SCNC: 17 MMOL/L — HIGH (ref 7–14)
BUN SERPL-MCNC: 16 MG/DL — SIGNIFICANT CHANGE UP (ref 10–20)
CALCIUM SERPL-MCNC: 8.9 MG/DL — SIGNIFICANT CHANGE UP (ref 8.5–10.1)
CHLORIDE SERPL-SCNC: 97 MMOL/L — LOW (ref 98–110)
CK MB CFR SERPL CALC: 4.9 NG/ML — SIGNIFICANT CHANGE UP (ref 0.6–6.3)
CK SERPL-CCNC: 262 U/L — HIGH (ref 0–225)
CO2 SERPL-SCNC: 22 MMOL/L — SIGNIFICANT CHANGE UP (ref 17–32)
CREAT SERPL-MCNC: 1 MG/DL — SIGNIFICANT CHANGE UP (ref 0.7–1.5)
GLUCOSE SERPL-MCNC: 113 MG/DL — HIGH (ref 70–99)
HCT VFR BLD CALC: 38.4 % — LOW (ref 42–52)
HGB BLD-MCNC: 12.7 G/DL — LOW (ref 14–18)
MAGNESIUM SERPL-MCNC: 1.9 MG/DL — SIGNIFICANT CHANGE UP (ref 1.8–2.4)
MCHC RBC-ENTMCNC: 28.2 PG — SIGNIFICANT CHANGE UP (ref 27–31)
MCHC RBC-ENTMCNC: 33.1 G/DL — SIGNIFICANT CHANGE UP (ref 32–37)
MCV RBC AUTO: 85.1 FL — SIGNIFICANT CHANGE UP (ref 80–94)
NRBC # BLD: 0 /100 WBCS — SIGNIFICANT CHANGE UP (ref 0–0)
PHOSPHATE SERPL-MCNC: 2.4 MG/DL — SIGNIFICANT CHANGE UP (ref 2.1–4.9)
PLATELET # BLD AUTO: 150 K/UL — SIGNIFICANT CHANGE UP (ref 130–400)
POTASSIUM SERPL-MCNC: 4.5 MMOL/L — SIGNIFICANT CHANGE UP (ref 3.5–5)
POTASSIUM SERPL-SCNC: 4.5 MMOL/L — SIGNIFICANT CHANGE UP (ref 3.5–5)
RBC # BLD: 4.51 M/UL — LOW (ref 4.7–6.1)
RBC # FLD: 13 % — SIGNIFICANT CHANGE UP (ref 11.5–14.5)
SODIUM SERPL-SCNC: 136 MMOL/L — SIGNIFICANT CHANGE UP (ref 135–146)
TROPONIN T SERPL-MCNC: 4.54 NG/ML — CRITICAL HIGH
WBC # BLD: 14.19 K/UL — HIGH (ref 4.8–10.8)
WBC # FLD AUTO: 14.19 K/UL — HIGH (ref 4.8–10.8)

## 2019-01-19 RX ORDER — METOPROLOL TARTRATE 50 MG
100 TABLET ORAL DAILY
Qty: 0 | Refills: 0 | Status: DISCONTINUED | OUTPATIENT
Start: 2019-01-19 | End: 2019-01-20

## 2019-01-19 RX ORDER — PRASUGREL 5 MG/1
30 TABLET, FILM COATED ORAL ONCE
Qty: 0 | Refills: 0 | Status: COMPLETED | OUTPATIENT
Start: 2019-01-19 | End: 2019-01-19

## 2019-01-19 RX ORDER — LISINOPRIL 2.5 MG/1
2.5 TABLET ORAL DAILY
Qty: 0 | Refills: 0 | Status: DISCONTINUED | OUTPATIENT
Start: 2019-01-19 | End: 2019-01-20

## 2019-01-19 RX ADMIN — Medication 25 MILLIGRAM(S): at 11:48

## 2019-01-19 RX ADMIN — PANTOPRAZOLE SODIUM 40 MILLIGRAM(S): 20 TABLET, DELAYED RELEASE ORAL at 06:27

## 2019-01-19 RX ADMIN — HEPARIN SODIUM 5000 UNIT(S): 5000 INJECTION INTRAVENOUS; SUBCUTANEOUS at 21:38

## 2019-01-19 RX ADMIN — TICAGRELOR 90 MILLIGRAM(S): 90 TABLET ORAL at 06:27

## 2019-01-19 RX ADMIN — PANTOPRAZOLE SODIUM 40 MILLIGRAM(S): 20 TABLET, DELAYED RELEASE ORAL at 17:50

## 2019-01-19 RX ADMIN — Medication 25 MILLIGRAM(S): at 06:27

## 2019-01-19 RX ADMIN — Medication 25 MILLIGRAM(S): at 00:15

## 2019-01-19 RX ADMIN — HEPARIN SODIUM 5000 UNIT(S): 5000 INJECTION INTRAVENOUS; SUBCUTANEOUS at 06:27

## 2019-01-19 RX ADMIN — HEPARIN SODIUM 5000 UNIT(S): 5000 INJECTION INTRAVENOUS; SUBCUTANEOUS at 14:30

## 2019-01-19 RX ADMIN — PRASUGREL 30 MILLIGRAM(S): 5 TABLET, FILM COATED ORAL at 15:00

## 2019-01-19 RX ADMIN — CHLORHEXIDINE GLUCONATE 1 APPLICATION(S): 213 SOLUTION TOPICAL at 06:28

## 2019-01-19 RX ADMIN — Medication 81 MILLIGRAM(S): at 11:48

## 2019-01-19 RX ADMIN — ATORVASTATIN CALCIUM 80 MILLIGRAM(S): 80 TABLET, FILM COATED ORAL at 21:38

## 2019-01-19 NOTE — DISCHARGE NOTE ADULT - ADDITIONAL INSTRUCTIONS
f/u with Dr robin in 1 week  f/u with PMD in 1 week  Take all medications as prescribed. DO NOT MISS the DAPT - aspirin and effient- high risk of stent restenosis   If chest pain, difficulty breathing palpitations dizziness, call 911/ come to nearest ED

## 2019-01-19 NOTE — DISCHARGE NOTE ADULT - PATIENT PORTAL LINK FT
You can access the [facility] Patient Portal, offered by [facility], by registering with the following website: [email or web address]

## 2019-01-19 NOTE — DISCHARGE NOTE ADULT - PLAN OF CARE
1- take medications as prescribed  2- follow up with Dr. Edis Edward in 1 week  3- follow up with PMD in 1-2 weeks optimize medical management

## 2019-01-19 NOTE — PROGRESS NOTE ADULT - SUBJECTIVE AND OBJECTIVE BOX
Pt seen and examined at bedside. No CP or SOB. Pt complained of tremor like     PAST MEDICAL & SURGICAL HISTORY:  Prostate cancer  H/O prostatectomy      VITAL SIGNS (Last 24 hrs):  T(C): 37.6 (19 @ 07:31), Max: 37.6 (19 @ 07:31)  HR: 90 (19 @ 11:32) (72 - 90)  BP: 114/72 (19 @ 11:32) (99/61 - 119/73)  RR: 25 (19 @ 07:31) (18 - 25)  SpO2: 99% (19 @ 07:31) (95% - 100%)  Wt(kg): --  Daily     Daily Weight in k.1 (2019 06:00)    I&O's Summary    2019 07:01  -  2019 07:00  --------------------------------------------------------  IN: 1260 mL / OUT: 2475 mL / NET: -1215 mL        PHYSICAL EXAM:  GENERAL: NAD, well-developed  HEAD:  Atraumatic, Normocephalic  EYES: EOMI, PERRLA, conjunctiva and sclera clear  NECK: Supple, No JVD  CHEST/LUNG: Clear to auscultation bilaterally; No wheeze  HEART: Regular rate and rhythm; No murmurs, rubs, or gallops  ABDOMEN: Soft, Nontender, Nondistended; Bowel sounds present  EXTREMITIES:  2+ Peripheral Pulses, No clubbing, cyanosis, or edema  PSYCH: AAOx3  NEUROLOGY: non-focal  SKIN: No rashes or lesions    Labs Reviewed  Spoke to patient in regards to abnormal labs.    CBC Full  -  ( 2019 04:09 )  WBC Count : 14.19 K/uL  Hemoglobin : 12.7 g/dL  Hematocrit : 38.4 %  Platelet Count - Automated : 150 K/uL  Mean Cell Volume : 85.1 fL  Mean Cell Hemoglobin : 28.2 pg  Mean Cell Hemoglobin Concentration : 33.1 g/dL  Auto Neutrophil # : x  Auto Lymphocyte # : x  Auto Monocyte # : x  Auto Eosinophil # : x  Auto Basophil # : x  Auto Neutrophil % : x  Auto Lymphocyte % : x  Auto Monocyte % : x  Auto Eosinophil % : x  Auto Basophil % : x    BMP:     @ 04:09    Blood Urea Nitrogen - 16  Calcium - 8.9  Carbond Dioxide - 22  Chloride - 97  Creatinine - 1.0  Glucose - 113  Potassium - 4.5  Sodium - 136      Hemoglobin A1c - Hemoglobin A1C, Whole Blood: 6.1 % ( @ 04:41)    PT/INR - ( 2019 16:30 )   PT: 13.80 sec;   INR: 1.20 ratio         PTT - ( 2019 05:27 )  PTT:31.1 sec  Urine Culture:        Imaging reviewed      MEDICATIONS  (STANDING):  aspirin enteric coated 81 milliGRAM(s) Oral daily  atorvastatin 80 milliGRAM(s) Oral at bedtime  chlorhexidine 4% Liquid 1 Application(s) Topical <User Schedule>  heparin  Injectable 5000 Unit(s) SubCutaneous every 8 hours  lisinopril 2.5 milliGRAM(s) Oral daily  metoprolol succinate  milliGRAM(s) Oral daily  pantoprazole  Injectable 40 milliGRAM(s) IV Push every 12 hours  prasugrel 30 milliGRAM(s) Oral once    MEDICATIONS  (PRN):  morphine  - Injectable 2 milliGRAM(s) IV Push every 4 hours PRN Moderate Pain (4 - 6) Pt seen and examined at bedside. No CP or SOB. Pt complained of tremor like activity. He states that this has been going on for years but has thought nothing of it and did not see physician. Pt remembers episode, no LOC, paralysis, numbness or weakness.     PAST MEDICAL & SURGICAL HISTORY:  Prostate cancer  H/O prostatectomy      VITAL SIGNS (Last 24 hrs):  T(C): 37.6 (19 @ 07:31), Max: 37.6 (19 @ 07:31)  HR: 90 (19 @ 11:32) (72 - 90)  BP: 114/72 (19 @ 11:32) (99/61 - 119/73)  RR: 25 (19 @ 07:31) (18 - 25)  SpO2: 99% (19 @ 07:31) (95% - 100%)  Wt(kg): --  Daily     Daily Weight in k.1 (2019 06:00)    I&O's Summary    2019 07:01  -  2019 07:00  --------------------------------------------------------  IN: 1260 mL / OUT: 2475 mL / NET: -1215 mL        PHYSICAL EXAM:  GENERAL: NAD, well-developed  HEAD:  Atraumatic, Normocephalic  EYES: EOMI, PERRLA, conjunctiva and sclera clear  NECK: Supple, No JVD  CHEST/LUNG: Clear to auscultation bilaterally; No wheeze  HEART: Regular rate and rhythm; No murmurs, rubs, or gallops  ABDOMEN: Soft, Nontender, Nondistended; Bowel sounds present  EXTREMITIES:  2+ Peripheral Pulses, No clubbing, cyanosis, or edema  PSYCH: AAOx3  NEUROLOGY: non-focal  SKIN: No rashes or lesions    Labs Reviewed  Spoke to patient in regards to abnormal labs.    CBC Full  -  ( 2019 04:09 )  WBC Count : 14.19 K/uL  Hemoglobin : 12.7 g/dL  Hematocrit : 38.4 %  Platelet Count - Automated : 150 K/uL  Mean Cell Volume : 85.1 fL  Mean Cell Hemoglobin : 28.2 pg  Mean Cell Hemoglobin Concentration : 33.1 g/dL  Auto Neutrophil # : x  Auto Lymphocyte # : x  Auto Monocyte # : x  Auto Eosinophil # : x  Auto Basophil # : x  Auto Neutrophil % : x  Auto Lymphocyte % : x  Auto Monocyte % : x  Auto Eosinophil % : x  Auto Basophil % : x    BMP:     @ 04:09    Blood Urea Nitrogen - 16  Calcium - 8.9  Carbond Dioxide - 22  Chloride - 97  Creatinine - 1.0  Glucose - 113  Potassium - 4.5  Sodium - 136      Hemoglobin A1c - Hemoglobin A1C, Whole Blood: 6.1 % ( @ 04:41)    PT/INR - ( 2019 16:30 )   PT: 13.80 sec;   INR: 1.20 ratio         PTT - ( 2019 05:27 )  PTT:31.1 sec  Urine Culture:        Imaging reviewed      MEDICATIONS  (STANDING):  aspirin enteric coated 81 milliGRAM(s) Oral daily  atorvastatin 80 milliGRAM(s) Oral at bedtime  chlorhexidine 4% Liquid 1 Application(s) Topical <User Schedule>  heparin  Injectable 5000 Unit(s) SubCutaneous every 8 hours  lisinopril 2.5 milliGRAM(s) Oral daily  metoprolol succinate  milliGRAM(s) Oral daily  pantoprazole  Injectable 40 milliGRAM(s) IV Push every 12 hours  prasugrel 30 milliGRAM(s) Oral once    MEDICATIONS  (PRN):  morphine  - Injectable 2 milliGRAM(s) IV Push every 4 hours PRN Moderate Pain (4 - 6)

## 2019-01-19 NOTE — DISCHARGE NOTE ADULT - HOSPITAL COURSE
74 y/o male with PMHx of prostate cancer s/p prostatectomy p/w Chest pain 30 mins before presentation. patient chest pain started as pressure, 10/10, radiating to the left arm. it was associated with sweating, nausea and SOB. patient felt weak and came to the ED. No fever, cough, v/d, bowel or urinary symptoms. In the ED, patient was found to have STEMI in the antro-lateral leads.   Patient has no cardiac history and he doesn't take medications.    -S/P proximal LAD DANYELLE during LHC and then s/p LHC again for stent Thrombosis with successful recanalization of the proximal DANYELLE with POBA and placement of a DANYELLE in the distal LAD  -c/w Asa, Brilinta, metoprolol , ACEi stopped d/t ODALIS  -Echo: EF 35-40%; Grade I diastolic dysfunction, trivial pericardial effusion, mild tricuspid regurgitation, mild aortic regurgitation.   -Off IABP. BP stable.   -Aggressive risk factor modification,  diet counseling, smoking cessation discussed with patient      pt is cleared to be discharged by cardio and medicine to follow up with Dr. Randhawa in 1 week 74 y/o male with PMHx of prostate cancer s/p prostatectomy p/w Chest pain 30 mins before presentation. patient chest pain started as pressure, 10/10, radiating to the left arm. it was associated with sweating, nausea and SOB. patient felt weak and came to the ED. No fever, cough, v/d, bowel or urinary symptoms. In the ED, patient was found to have STEMI in the antro-lateral leads.   Patient has no cardiac history and he doesn't take medications.    -S/P proximal LAD DANYELLE during LHC and then s/p LHC again for stent Thrombosis with successful recanalization of the proximal DANYELLE with POBA and placement of a DANYELLE in the distal LAD  -c/w Asa, Brilinta, metoprolol , Started on lisinopril  -Echo: EF 35-40%; Grade I diastolic dysfunction, trivial pericardial effusion, mild tricuspid regurgitation, mild aortic regurgitation. repeat echo: EF 45-50%  -Off IABP. BP stable.   -Aggressive risk factor modification,  diet counseling, smoking cessation discussed with patient      pt is cleared to be discharged by cardio and medicine to follow up with Dr. Randhawa in 1 week

## 2019-01-19 NOTE — PROGRESS NOTE ADULT - SUBJECTIVE AND OBJECTIVE BOX
Over Night Events:    No major events over night. Bp remains controlled.    ROS:  See HPI    PHYSICAL EXAM    ICU Vital Signs Last 24 Hrs  T(C): 36.9 (19 Jan 2019 00:00), Max: 37.2 (18 Jan 2019 08:00)  T(F): 98.4 (19 Jan 2019 00:00), Max: 99 (18 Jan 2019 16:00)  HR: 82 (19 Jan 2019 00:00) (78 - 92)  BP: 100/62 (19 Jan 2019 00:00) (93/71 - 127/72)  BP(mean): 73 (19 Jan 2019 00:00) (73 - 94)  ABP: --  ABP(mean): --  RR: 20 (19 Jan 2019 00:00) (13 - 22)  SpO2: 99% (19 Jan 2019 00:00) (95% - 100%)      General:  General: NAD, AAOx3  HEENT: NCAT, EOMI, PERRLA  Neck: supple, no JVD  CV: RRR, S1S2 nl, no murmurs   Lungs: CTA bilaterally, no wheeze, rales or rhonchi  Abdomen: soft, NT/ND, +BS  Extremities: ROM nl, no clubbing, cyanosis or edema. Right groin- some ecchymoses.      01-17-19 @ 07:01  -  01-18-19 @ 07:00  --------------------------------------------------------  IN:    heparin Infusion: 96 mL    Oral Fluid: 840 mL  Total IN: 936 mL    OUT:    Voided: 1900 mL  Total OUT: 1900 mL    Total NET: -964 mL      01-18-19 @ 07:01 - 01-19-19 @ 01:46  --------------------------------------------------------  IN:    Oral Fluid: 1140 mL  Total IN: 1140 mL    OUT:    Voided: 1825 mL  Total OUT: 1825 mL    Total NET: -685 mL          LABS:               12.4   13.13 )-----------( 146      ( 18 Jan 2019 05:27 )             37.4                                               01-18    138  |  100  |  17  ----------------------------<  120<H>  4.4   |  24  |  1.2    Ca    8.9      18 Jan 2019 05:27  Phos  2.0     01-18  Mg     1.8     01-18    TPro  6.7  /  Alb  3.3<L>  /  TBili  1.1  /  DBili  x   /  AST  267<H>  /  ALT  60<H>  /  AlkPhos  71  01-17      PTT - ( 18 Jan 2019 05:27 )  PTT:31.1 sec                                           CARDIAC MARKERS ( 18 Jan 2019 05:27 )  x     / 4.48 ng/mL / 668 U/L / x     / 17.6 ng/mL  CARDIAC MARKERS ( 17 Jan 2019 04:41 )  x     / 2.83 ng/mL / 1893 U/L / x     / 163.2 ng/mL                                            LIVER FUNCTIONS - ( 17 Jan 2019 04:41 )  Alb: 3.3 g/dL / Pro: 6.7 g/dL / ALK PHOS: 71 U/L / ALT: 60 U/L / AST: 267 U/L / GGT: x                                                       MEDICATIONS  (STANDING):  aspirin enteric coated 81 milliGRAM(s) Oral daily  atorvastatin 80 milliGRAM(s) Oral at bedtime  chlorhexidine 4% Liquid 1 Application(s) Topical <User Schedule>  heparin  Injectable 5000 Unit(s) SubCutaneous every 8 hours  metoprolol tartrate 25 milliGRAM(s) Oral every 6 hours  pantoprazole  Injectable 40 milliGRAM(s) IV Push every 12 hours  ticagrelor 90 milliGRAM(s) Oral two times a day    MEDICATIONS  (PRN):  morphine  - Injectable 2 milliGRAM(s) IV Push every 4 hours PRN Moderate Pain (4 - 6)    ECHO:    < from: Transthoracic Echocardiogram (01.18.19 @ 11:59) >  Summary:   1. Left ventricular ejection fraction, by visual estimation, is 45 to   50%.   2. Mildly decreased global left ventricular systolic function.   3. Mid and apical anterior septum and apex are abnormal as described   above.   4. Mild tricuspid regurgitation.   5.PSAP at least 35.   6. Trace pulmonic valve regurgitation.   7. There is mild aortic root calcification.    < end of copied text >

## 2019-01-19 NOTE — DISCHARGE NOTE ADULT - CARE PROVIDERS DIRECT ADDRESSES
[email or web address]

## 2019-01-19 NOTE — PROGRESS NOTE ADULT - ASSESSMENT
ASSESSMENT & PLAN    1. S/P STEMI for anterior wall MI  2. S/P Stent Thrombosis of LAD DANYELLE  3. 3 Vessel CAD by cardiac cath*2    Plan:     -S/P proximal LAD DANYELLE during LHC and then s/p LHC again for stent Thrombosis with successful recanalization of the proximal DANYELLE with POBA and placement of a DANYELLE in the distal LAD  - c/w Asa, Brilinta, metoprolol 25mg q 6 hrs. ACEi stopped d/t ODALIS  - Repeat Echo today: < from: Transthoracic Echocardiogram (01.18.19 @ 11:59) >  Summary:   1. Left ventricular ejection fraction, by visual estimation, is 45 to   50%.   2. Mildly decreased global left ventricular systolic function.   3. Mid and apical anterior septum and apex are abnormal as described   above.   4. Mild tricuspid regurgitation.   5.PSAP at least 35.   6. Trace pulmonic valve regurgitation.   7. There is mild aortic root calcification.    < end of copied text >    -Off IABP. BP stable. Continue to monitor.  -IV Zofran prn for nausea.   -Aggressive risk factor modification,  diet counseling, smoking cessation discussed with patient        #DVT prophylaxis On Heparin SC  #GI prophylaxis Protonix IV bid  #Diet DASH  #Activity IAT- OOB to chair  #Code status FULL  #Disposition: from home ASSESSMENT & PLAN    1. S/P STEMI for anterior wall MI  2. S/P Stent Thrombosis of LAD DANYELLE  3. 3 Vessel CAD by cardiac cath*2    Plan:     -S/P proximal LAD DANYELLE during LHC and then s/p LHC again for stent Thrombosis with successful recanalization of the proximal DANYELLE with POBA and placement of a DANYELLE in the distal LAD  - c/w Asa, Brilinta, metoprolol 25mg q 6 hrs. ACEi stopped d/t ODALIS  - Repeat Echo yesterday: < from: Transthoracic Echocardiogram (01.18.19 @ 11:59) >  Summary:   1. Left ventricular ejection fraction, by visual estimation, is 45 to   50%.   2. Mildly decreased global left ventricular systolic function.   3. Mid and apical anterior septum and apex are abnormal as described   above.   4. Mild tricuspid regurgitation.   5.PSAP at least 35.   6. Trace pulmonic valve regurgitation.   7. There is mild aortic root calcification.    < end of copied text >    -Off IABP. BP stable. Continue to monitor.  -IV Zofran prn for nausea.   -Aggressive risk factor modification,  diet counseling, smoking cessation discussed with patient        #DVT prophylaxis On Heparin SC  #GI prophylaxis Protonix IV bid  #Diet DASH  #Activity IAT- OOB to chair  #Code status FULL  #Disposition: from home

## 2019-01-19 NOTE — DISCHARGE NOTE ADULT - MEDICATION SUMMARY - MEDICATIONS TO TAKE
I will START or STAY ON the medications listed below when I get home from the hospital:    aspirin 81 mg oral delayed release tablet  -- 1 tab(s) by mouth once a day  -- Indication: For cad    lisinopril 2.5 mg oral tablet  -- 1 tab(s) by mouth once a day  -- Indication: For cad    atorvastatin 80 mg oral tablet  -- 1 tab(s) by mouth once a day (at bedtime)  -- Indication: For cad    prasugrel 10 mg oral tablet  -- 1 tab(s) by mouth once a day  -- Indication: For cad    metoprolol succinate 100 mg oral tablet, extended release  -- 1 tab(s) by mouth once a day  -- Indication: For cad    pantoprazole 40 mg oral delayed release tablet  -- 1 tab(s) by mouth once a day (before a meal)  -- Indication: For gerd

## 2019-01-19 NOTE — CHART NOTE - NSCHARTNOTEFT_GEN_A_CORE
Cardiology NP    Lashell not covered by insurance, plan to switch to effient prior to d/c if pt agreeable to $45/copay, pt uncertain can afford copay, will discuss with family.  Please notify cardiology prior to d/c so can decide on which anti-platelet to discharge pt home on.

## 2019-01-19 NOTE — DISCHARGE NOTE ADULT - CARE PROVIDER_API CALL
Wagner Randhawa), Cardiovascular Disease; Internal Medicine; Interventional Cardiology  [address] [place] [address]  Phone: [phone]  Fax: [phone]    Gentry Ayala), [address] [place] [address]  Phone: [phone]  Fax: [phone]

## 2019-01-19 NOTE — DISCHARGE NOTE ADULT - CARE PLAN
Principal Discharge DX:	STEMI (ST elevation myocardial infarction)  Assessment and plan of treatment:	1- take medications as prescribed  2- follow up with Dr. Edis Edward in 1 week  3- follow up with PMD in 1-2 weeks Principal Discharge DX:	STEMI (ST elevation myocardial infarction)  Goal:	optimize medical management  Assessment and plan of treatment:	1- take medications as prescribed  2- follow up with Dr. Edis Edward in 1 week  3- follow up with PMD in 1-2 weeks

## 2019-01-19 NOTE — PROGRESS NOTE ADULT - ASSESSMENT
1. S/P STEMI for anterior wall MI  2. S/P Stent Thrombosis of LAD DANYELLE  3. 3 Vessel CAD by cardiac cath  4. 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 Pulm HTN seen on echo    Plan:     -S/P proximal LAD DANYELLE during LHC and then s/p LHC again for stent Thrombosis with successful recanalization of the proximal DANYELLE with POBA and placement of a DANYELLE in the distal LAD  -c/w Asa, switched lopressor to toprol  mg today, c/w lipitor 80 mg, started on lisinopril 2.5 mg   -Brilinta cost $400 vs Effiant $45 copay, risks of increased bleeding dw pt at bedside by cardiology, pt and wife wanted to start effiant. Brilinta also caused SE of SOB. Given the failure of plavix and SE of Brilinta Pt to be started on Effient in CCU and monitored for bleeding or SE for 24 hrs and likely DCed in am 1/20/2019.   -Echo: EF 35-40%; Grade I diastolic dysfunction, trivial pericardial effusion, mild tricuspid regurgitation, mild aortic regurgitation. Repeat Echo showed improved EF to normal. Continue meds as above. FU with cardiology outpt  -BP stable. Continue to monitor.  -IV Zofran prn for nausea.   -Aggressive risk factor modification,  diet counseling, smoking cessation discussed with patient      - Pt should follow up with PCP dr. Reyes and Cards Dr. Randhawa as outpt next week  -May need referral to pulm for PFTs and evaluation for Pulm HTN.  -Med recc to be dw cardio prior to DC and will be reviewed by medicine.       #DVT prophylaxis On Heparin sq  #GI prophylaxis Protonix IV bid  #Diet DASH  #Activity IAT- OOB to chair  #Code status FULL  #Disposition Pt to be started on Effient in CCU and monitored for bleeding or SE for 24 hrs and likely DCed in am 1/20/2019. 1. S/P STEMI for anterior wall MI  2. S/P Stent Thrombosis of LAD DANYELLE  3. 3 Vessel CAD by cardiac cath  4. 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 Pulm HTN seen on echo  7. Tremor activity likely essential tremor     Plan:   -S/P proximal LAD DANYELLE during LHC and then s/p LHC again for stent Thrombosis with successful recanalization of the proximal DANYELLE with POBA and placement of a DANYELLE in the distal LAD  -c/w Asa, switched lopressor to toprol  mg today, c/w lipitor 80 mg, started on lisinopril 2.5 mg   -Brilinta cost $400 vs Effiant $45 copay, risks of increased bleeding dw pt at bedside by cardiology, pt and wife wanted to start effiant. Brilinta also caused SE of SOB. Given the failure of plavix and SE of Brilinta Pt to be started on Effient in CCU and monitored for bleeding or SE for 24 hrs and likely DCed in am 1/20/2019.   -Echo: EF 35-40%; Grade I diastolic dysfunction, trivial pericardial effusion, mild tricuspid regurgitation, mild aortic regurgitation. Repeat Echo showed improved EF to normal. Continue meds as above. FU with cardiology outpt  -BP stable. Continue to monitor.  -IV Zofran prn for nausea.   -Aggressive risk factor modification,  diet counseling, smoking cessation discussed with patient      - Pt should follow up with PCP dr. Reyes and Cards Dr. Randhawa as outpt next week  -May need referral to pulm for PFTs and evaluation for Pulm HTN.  -Med recc to be dw cardio prior to DC and will be reviewed by medicine.   -Follow up out pt for tremor activity, pt currently on BB.       #DVT prophylaxis On Heparin sq  #GI prophylaxis Protonix IV bid  #Diet DASH  #Activity IAT- OOB to chair  #Code status FULL  #Disposition Pt to be started on Effient in CCU and monitored for bleeding or SE for 24 hrs and likely DCed in am 1/20/2019.

## 2019-01-20 VITALS
DIASTOLIC BLOOD PRESSURE: 60 MMHG | HEART RATE: 80 BPM | RESPIRATION RATE: 24 BRPM | SYSTOLIC BLOOD PRESSURE: 99 MMHG | OXYGEN SATURATION: 95 %

## 2019-01-20 LAB
ANION GAP SERPL CALC-SCNC: 17 MMOL/L — HIGH (ref 7–14)
BUN SERPL-MCNC: 19 MG/DL — SIGNIFICANT CHANGE UP (ref 10–20)
CALCIUM SERPL-MCNC: 8.2 MG/DL — LOW (ref 8.5–10.1)
CHLORIDE SERPL-SCNC: 98 MMOL/L — SIGNIFICANT CHANGE UP (ref 98–110)
CK MB CFR SERPL CALC: 3.9 NG/ML — SIGNIFICANT CHANGE UP (ref 0.6–6.3)
CK SERPL-CCNC: 162 U/L — SIGNIFICANT CHANGE UP (ref 0–225)
CO2 SERPL-SCNC: 21 MMOL/L — SIGNIFICANT CHANGE UP (ref 17–32)
CREAT SERPL-MCNC: 1.1 MG/DL — SIGNIFICANT CHANGE UP (ref 0.7–1.5)
GLUCOSE SERPL-MCNC: 118 MG/DL — HIGH (ref 70–99)
HCT VFR BLD CALC: 36 % — LOW (ref 42–52)
HGB BLD-MCNC: 12.2 G/DL — LOW (ref 14–18)
MAGNESIUM SERPL-MCNC: 1.9 MG/DL — SIGNIFICANT CHANGE UP (ref 1.8–2.4)
MCHC RBC-ENTMCNC: 28.6 PG — SIGNIFICANT CHANGE UP (ref 27–31)
MCHC RBC-ENTMCNC: 33.9 G/DL — SIGNIFICANT CHANGE UP (ref 32–37)
MCV RBC AUTO: 84.3 FL — SIGNIFICANT CHANGE UP (ref 80–94)
NRBC # BLD: 0 /100 WBCS — SIGNIFICANT CHANGE UP (ref 0–0)
PHOSPHATE SERPL-MCNC: 3.2 MG/DL — SIGNIFICANT CHANGE UP (ref 2.1–4.9)
PLATELET # BLD AUTO: 171 K/UL — SIGNIFICANT CHANGE UP (ref 130–400)
POTASSIUM SERPL-MCNC: 4 MMOL/L — SIGNIFICANT CHANGE UP (ref 3.5–5)
POTASSIUM SERPL-SCNC: 4 MMOL/L — SIGNIFICANT CHANGE UP (ref 3.5–5)
RBC # BLD: 4.27 M/UL — LOW (ref 4.7–6.1)
RBC # FLD: 13.1 % — SIGNIFICANT CHANGE UP (ref 11.5–14.5)
SODIUM SERPL-SCNC: 136 MMOL/L — SIGNIFICANT CHANGE UP (ref 135–146)
TROPONIN T SERPL-MCNC: 4.75 NG/ML — CRITICAL HIGH
WBC # BLD: 10.92 K/UL — HIGH (ref 4.8–10.8)
WBC # FLD AUTO: 10.92 K/UL — HIGH (ref 4.8–10.8)

## 2019-01-20 RX ORDER — PRASUGREL 5 MG/1
1 TABLET, FILM COATED ORAL
Qty: 30 | Refills: 0 | OUTPATIENT
Start: 2019-01-20 | End: 2019-02-18

## 2019-01-20 RX ORDER — ASPIRIN/CALCIUM CARB/MAGNESIUM 324 MG
1 TABLET ORAL
Qty: 30 | Refills: 0
Start: 2019-01-20 | End: 2019-02-18

## 2019-01-20 RX ORDER — PRASUGREL 5 MG/1
1 TABLET, FILM COATED ORAL
Qty: 30 | Refills: 1
Start: 2019-01-20 | End: 2019-03-20

## 2019-01-20 RX ORDER — METOPROLOL TARTRATE 50 MG
1 TABLET ORAL
Qty: 30 | Refills: 0
Start: 2019-01-20 | End: 2019-02-18

## 2019-01-20 RX ORDER — PANTOPRAZOLE SODIUM 20 MG/1
1 TABLET, DELAYED RELEASE ORAL
Qty: 30 | Refills: 0
Start: 2019-01-20 | End: 2019-02-18

## 2019-01-20 RX ORDER — PRASUGREL 5 MG/1
10 TABLET, FILM COATED ORAL DAILY
Qty: 0 | Refills: 0 | Status: DISCONTINUED | OUTPATIENT
Start: 2019-01-20 | End: 2019-01-20

## 2019-01-20 RX ORDER — PANTOPRAZOLE SODIUM 20 MG/1
40 TABLET, DELAYED RELEASE ORAL
Qty: 0 | Refills: 0 | Status: DISCONTINUED | OUTPATIENT
Start: 2019-01-20 | End: 2019-01-20

## 2019-01-20 RX ORDER — LISINOPRIL 2.5 MG/1
1 TABLET ORAL
Qty: 30 | Refills: 0
Start: 2019-01-20 | End: 2019-02-18

## 2019-01-20 RX ORDER — ATORVASTATIN CALCIUM 80 MG/1
1 TABLET, FILM COATED ORAL
Qty: 30 | Refills: 0
Start: 2019-01-20 | End: 2019-02-18

## 2019-01-20 RX ADMIN — LISINOPRIL 2.5 MILLIGRAM(S): 2.5 TABLET ORAL at 06:55

## 2019-01-20 RX ADMIN — Medication 81 MILLIGRAM(S): at 11:56

## 2019-01-20 RX ADMIN — PANTOPRAZOLE SODIUM 40 MILLIGRAM(S): 20 TABLET, DELAYED RELEASE ORAL at 07:30

## 2019-01-20 RX ADMIN — Medication 100 MILLIGRAM(S): at 06:55

## 2019-01-20 RX ADMIN — PRASUGREL 10 MILLIGRAM(S): 5 TABLET, FILM COATED ORAL at 11:56

## 2019-01-20 RX ADMIN — HEPARIN SODIUM 5000 UNIT(S): 5000 INJECTION INTRAVENOUS; SUBCUTANEOUS at 06:54

## 2019-01-20 NOTE — PROGRESS NOTE ADULT - SUBJECTIVE AND OBJECTIVE BOX
Pt seen and examined at bedside. No CP or SOB.    PAST MEDICAL & SURGICAL HISTORY:  Prostate cancer  H/O prostatectomy      VITAL SIGNS (Last 24 hrs):  T(C): 36.7 (19 @ 07:29), Max: 37.4 (19 @ 00:00)  HR: 80 (19 @ 11:53) (74 - 84)  BP: 99/60 (19 @ 11:53) (97/68 - 113/63)  RR: 24 (19 @ 11:53) (18 - 24)  SpO2: 95% (19 @ 11:53) (94% - 99%)  Wt(kg): --  Daily     Daily Weight in k.4 (2019 06:00)    I&O's Summary    2019 07:01  -  2019 07:00  --------------------------------------------------------  IN: 120 mL / OUT: 476 mL / NET: -356 mL        PHYSICAL EXAM:  GENERAL: NAD, well-developed  HEAD:  Atraumatic, Normocephalic  EYES: EOMI, PERRLA, conjunctiva and sclera clear  NECK: Supple, No JVD  CHEST/LUNG: Clear to auscultation bilaterally; No wheeze  HEART: Regular rate and rhythm; No murmurs, rubs, or gallops  ABDOMEN: Soft, Nontender, Nondistended; Bowel sounds present  EXTREMITIES:  2+ Peripheral Pulses, No clubbing, cyanosis, or edema  PSYCH: AAOx3  NEUROLOGY: non-focal  SKIN: No rashes or lesions    Labs Reviewed  Spoke to patient in regards to abnormal labs.    CBC Full  -  ( 2019 04:30 )  WBC Count : 10.92 K/uL  Hemoglobin : 12.2 g/dL  Hematocrit : 36.0 %  Platelet Count - Automated : 171 K/uL  Mean Cell Volume : 84.3 fL  Mean Cell Hemoglobin : 28.6 pg  Mean Cell Hemoglobin Concentration : 33.9 g/dL  Auto Neutrophil # : x  Auto Lymphocyte # : x  Auto Monocyte # : x  Auto Eosinophil # : x  Auto Basophil # : x  Auto Neutrophil % : x  Auto Lymphocyte % : x  Auto Monocyte % : x  Auto Eosinophil % : x  Auto Basophil % : x    BMP:     @ 04:30    Blood Urea Nitrogen - 19  Calcium - 8.2  Carbond Dioxide - 21  Chloride - 98  Creatinine - 1.1  Glucose - 118  Potassium - 4.0  Sodium - 136      Hemoglobin A1c - Hemoglobin A1C, Whole Blood: 6.1 % ( @ 04:41)    PTT - ( 2019 05:27 )  PTT:31.1 sec  Urine Culture:        Imaging reviewed      MEDICATIONS  (STANDING):  aspirin enteric coated 81 milliGRAM(s) Oral daily  atorvastatin 80 milliGRAM(s) Oral at bedtime  chlorhexidine 4% Liquid 1 Application(s) Topical <User Schedule>  heparin  Injectable 5000 Unit(s) SubCutaneous every 8 hours  lisinopril 2.5 milliGRAM(s) Oral daily  metoprolol succinate  milliGRAM(s) Oral daily  pantoprazole    Tablet 40 milliGRAM(s) Oral before breakfast  prasugrel 10 milliGRAM(s) Oral daily    MEDICATIONS  (PRN):  morphine  - Injectable 2 milliGRAM(s) IV Push every 4 hours PRN Moderate Pain (4 - 6)

## 2019-01-20 NOTE — PROGRESS NOTE ADULT - SUBJECTIVE AND OBJECTIVE BOX
Over Night Events:    No major events over night. Bp remains controlled.    ROS:  See HPI    PHYSICAL EXAM    ICU Vital Signs Last 24 Hrs  T(C): 36.9 (20 Jan 2019 00:00), Max: 37.6 (19 Jan 2019 07:31)  T(F): 98.4 (20 Jan 2019 00:00), Max: 99.7 (19 Jan 2019 07:31)  HR: 76 (20 Jan 2019 00:00) (72 - 90)  BP: 111/64 (20 Jan 2019 00:00) (97/64 - 119/73)  BP(mean): 77 (20 Jan 2019 00:00) (70 - 107)  RR: 20 (20 Jan 2019 00:00) (18 - 25)  SpO2: 98% (20 Jan 2019 00:00) (94% - 100%)    General:  General: NAD, AAOx3  HEENT: NCAT, EOMI, PERRLA  Neck: supple, no JVD  CV: RRR, S1S2 nl, no murmurs   Lungs: CTA bilaterally, no wheeze, rales or rhonchi  Abdomen: soft, NT/ND, +BS  Extremities: ROM nl, no clubbing, cyanosis or edema. Right groin- some ecchymoses.    I&O's Summary    18 Jan 2019 07:01  -  19 Jan 2019 07:00  --------------------------------------------------------  IN: 1260 mL / OUT: 2475 mL / NET: -1215 mL    19 Jan 2019 07:01  -  20 Jan 2019 00:10  --------------------------------------------------------  IN: 0 mL / OUT: 1 mL / NET: -1 mL      LABS:                          12.7   14.19 )-----------( 150      ( 19 Jan 2019 04:09 )             38.4   01-19    136  |  97<L>  |  16  ----------------------------<  113<H>  4.5   |  22  |  1.0    Ca    8.9      19 Jan 2019 04:09  Phos  2.4     01-19  Mg     1.9     01-19        TPro  6.7  /  Alb  3.3<L>  /  TBili  1.1  /  DBili  x   /  AST  267<H>  /  ALT  60<H>  /  AlkPhos  71  01-17      PTT - ( 18 Jan 2019 05:27 )  PTT:31.1 sec                                           CARDIAC MARKERS ( 18 Jan 2019 05:27 )  x     / 4.48 ng/mL / 668 U/L / x     / 17.6 ng/mL  CARDIAC MARKERS ( 17 Jan 2019 04:41 )  x     / 2.83 ng/mL / 1893 U/L / x     / 163.2 ng/mL                                            LIVER FUNCTIONS - ( 17 Jan 2019 04:41 )  Alb: 3.3 g/dL / Pro: 6.7 g/dL / ALK PHOS: 71 U/L / ALT: 60 U/L / AST: 267 U/L / GGT: x                                                       MEDICATIONS  (STANDING):  aspirin enteric coated 81 milliGRAM(s) Oral daily  atorvastatin 80 milliGRAM(s) Oral at bedtime  chlorhexidine 4% Liquid 1 Application(s) Topical <User Schedule>  heparin  Injectable 5000 Unit(s) SubCutaneous every 8 hours  metoprolol tartrate 25 milliGRAM(s) Oral every 6 hours  pantoprazole  Injectable 40 milliGRAM(s) IV Push every 12 hours  ticagrelor 90 milliGRAM(s) Oral two times a day    MEDICATIONS  (PRN):  morphine  - Injectable 2 milliGRAM(s) IV Push every 4 hours PRN Moderate Pain (4 - 6)    ECHO:    < from: Transthoracic Echocardiogram (01.18.19 @ 11:59) >  Summary:   1. Left ventricular ejection fraction, by visual estimation, is 45 to   50%.   2. Mildly decreased global left ventricular systolic function.   3. Mid and apical anterior septum and apex are abnormal as described   above.   4. Mild tricuspid regurgitation.   5.PSAP at least 35.   6. Trace pulmonic valve regurgitation.   7. There is mild aortic root calcification.    < end of copied text >

## 2019-01-20 NOTE — PROGRESS NOTE ADULT - ASSESSMENT
ASSESSMENT & PLAN    1. S/P STEMI for anterior wall MI  2. S/P Stent Thrombosis of LAD DANYELLE  3. 3 Vessel CAD by cardiac cath*2    Plan:     -S/P proximal LAD DANYELLE during LHC and then s/p LHC again for stent Thrombosis with successful recanalization of the proximal DANYELLE with POBA and placement of a DANYELLE in the distal LAD  - c/w Asa, Brilinta, metoprolol 25mg q 6 hrs. ACEi stopped d/t ODALIS   Echo : < from: Transthoracic Echocardiogram (01.18.19 @ 11:59) >  Summary:   1. Left ventricular ejection fraction, by visual estimation, is 45 to   50%.   2. Mildly decreased global left ventricular systolic function.   3. Mid and apical anterior septum and apex are abnormal as described   above.   4. Mild tricuspid regurgitation.   5.PSAP at least 35.   6. Trace pulmonic valve regurgitation.   7. There is mild aortic root calcification.    < end of copied text >    -Off IABP. BP stable. Continue to monitor.  -IV Zofran prn for nausea.   -Aggressive risk factor modification,  diet counseling, smoking cessation discussed with patient        #DVT prophylaxis On Heparin SC  #GI prophylaxis Protonix IV bid  #Diet DASH  #Activity IAT- OOB to chair  #Code status FULL  #Disposition: from home

## 2019-01-20 NOTE — PROGRESS NOTE ADULT - ASSESSMENT
1. S/P STEMI for anterior wall MI  2. S/P Stent Thrombosis of LAD DANYELLE  3. 3 Vessel CAD by cardiac cath  4. 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 Pulm HTN seen on echo  7. Tremor activity likely essential tremor     Plan:   Pt for DC today. Pt ambulated well around CCU off O2. Sauration 96% on RA on ambulation. No abnormalities in gait. DW Sw will DC with Home care. DW pt need to follow up with cardiology and PCP this week.   -c/w Asa, toprol  mg today, lipitor 80 mg, lisinopril 2.5 mg and effiant.  Copayment dw pt and agreed to pay.    -Echo: EF 35-40%; Grade I diastolic dysfunction, trivial pericardial effusion, mild tricuspid regurgitation, mild aortic regurgitation. Repeat Echo showed improved EF to normal. Continue meds as above. FU with cardiology outpt  -BP stable. Continue to monitor.  - Pt should follow up with PCP dr. Reyes and Cards Dr. Randhawa as outpt this week  -May need referral to pulm for PFTs and evaluation for Pulm HTN.  -Med recc dw resident  -Follow up out pt for tremor activity, pt currently on BB.

## 2019-01-20 NOTE — PROGRESS NOTE ADULT - PROVIDER SPECIALTY LIST ADULT
CCU
Cardiology
Hospitalist
Hospitalist
Cardiology

## 2019-01-22 PROBLEM — C61 MALIGNANT NEOPLASM OF PROSTATE: Chronic | Status: ACTIVE | Noted: 2019-01-16

## 2019-01-22 PROBLEM — Z00.00 ENCOUNTER FOR PREVENTIVE HEALTH EXAMINATION: Status: ACTIVE | Noted: 2019-01-22

## 2019-01-23 DIAGNOSIS — I21.09 ST ELEVATION (STEMI) MYOCARDIAL INFARCTION INVOLVING OTHER CORONARY ARTERY OF ANTERIOR WALL: ICD-10-CM
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
## 2019-01-23 DIAGNOSIS — I08.2 RHEUMATIC DISORDERS OF BOTH AORTIC AND TRICUSPID VALVES: ICD-10-CM

## 2019-01-23 DIAGNOSIS — I50.40 UNSPECIFIED COMBINED SYSTOLIC (CONGESTIVE) AND DIASTOLIC (CONGESTIVE) HEART FAILURE: ICD-10-CM

## 2019-01-23 DIAGNOSIS — G25.0 ESSENTIAL TREMOR: ICD-10-CM

## 2019-01-23 DIAGNOSIS — I21.4 NON-ST ELEVATION (NSTEMI) MYOCARDIAL INFARCTION: ICD-10-CM

## 2019-01-23 DIAGNOSIS — I47.2 VENTRICULAR TACHYCARDIA: ICD-10-CM

## 2019-01-23 DIAGNOSIS — Z87.891 PERSONAL HISTORY OF NICOTINE DEPENDENCE: ICD-10-CM

## 2019-01-23 DIAGNOSIS — I27.20 PULMONARY HYPERTENSION, UNSPECIFIED: ICD-10-CM
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
## 2019-01-23 DIAGNOSIS — I31.3 PERICARDIAL EFFUSION (NONINFLAMMATORY): ICD-10-CM

## 2019-01-23 DIAGNOSIS — L76.32 POSTPROCEDURAL HEMATOMA OF SKIN AND SUBCUTANEOUS TISSUE FOLLOWING OTHER PROCEDURE: ICD-10-CM

## 2019-01-23 DIAGNOSIS — Z85.46 PERSONAL HISTORY OF MALIGNANT NEOPLASM OF PROSTATE: ICD-10-CM

## 2019-01-30 ENCOUNTER — APPOINTMENT (OUTPATIENT)
Dept: CARDIOLOGY | Facility: CLINIC | Age: 74
End: 2019-01-30

## 2019-01-30 VITALS
DIASTOLIC BLOOD PRESSURE: 68 MMHG | BODY MASS INDEX: 29.19 KG/M2 | HEART RATE: 63 BPM | HEIGHT: 67 IN | SYSTOLIC BLOOD PRESSURE: 112 MMHG | WEIGHT: 186 LBS

## 2019-01-30 DIAGNOSIS — Z78.9 OTHER SPECIFIED HEALTH STATUS: ICD-10-CM

## 2019-01-30 DIAGNOSIS — Z87.891 PERSONAL HISTORY OF NICOTINE DEPENDENCE: ICD-10-CM

## 2019-01-30 RX ORDER — ASPIRIN ENTERIC COATED TABLETS 81 MG 81 MG/1
81 TABLET, DELAYED RELEASE ORAL DAILY
Refills: 0 | Status: ACTIVE | COMMUNITY

## 2019-01-30 NOTE — PHYSICAL EXAM
[General Appearance - Well Developed] : well developed [Normal Appearance] : normal appearance [Well Groomed] : well groomed [General Appearance - Well Nourished] : well nourished [No Deformities] : no deformities [General Appearance - In No Acute Distress] : no acute distress [Normal Conjunctiva] : the conjunctiva exhibited no abnormalities [Eyelids - No Xanthelasma] : the eyelids demonstrated no xanthelasmas [Normal Oral Mucosa] : normal oral mucosa [No Oral Pallor] : no oral pallor [No Oral Cyanosis] : no oral cyanosis [Normal Jugular Venous A Waves Present] : normal jugular venous A waves present [Normal Jugular Venous V Waves Present] : normal jugular venous V waves present [No Jugular Venous Connor A Waves] : no jugular venous connor A waves [Respiration, Rhythm And Depth] : normal respiratory rhythm and effort [Exaggerated Use Of Accessory Muscles For Inspiration] : no accessory muscle use [Auscultation Breath Sounds / Voice Sounds] : lungs were clear to auscultation bilaterally [Heart Rate And Rhythm] : heart rate and rhythm were normal [Heart Sounds] : normal S1 and S2 [Murmurs] : no murmurs present [Abdomen Soft] : soft [Abdomen Tenderness] : non-tender [Abdomen Mass (___ Cm)] : no abdominal mass palpated [Abnormal Walk] : normal gait [Gait - Sufficient For Exercise Testing] : the gait was sufficient for exercise testing [Nail Clubbing] : no clubbing of the fingernails [Cyanosis, Localized] : no localized cyanosis [Petechial Hemorrhages (___cm)] : no petechial hemorrhages [Skin Color & Pigmentation] : normal skin color and pigmentation [] : no rash [No Venous Stasis] : no venous stasis [Skin Lesions] : no skin lesions [No Skin Ulcers] : no skin ulcer [No Xanthoma] : no  xanthoma was observed [Oriented To Time, Place, And Person] : oriented to person, place, and time [Affect] : the affect was normal [Mood] : the mood was normal [No Anxiety] : not feeling anxious

## 2019-01-31 NOTE — HISTORY OF PRESENT ILLNESS
[FreeTextEntry1] : 1/16/2019 awmi\par STEMI\par S/P PC	 OF LAD\par STENT THROMBOSIS OF LAD STENT\par EF RETURNED TO NORMAL IN THE HOSPITAL\par \par C/O COUGH \par WILL STOP  LISINOPRIL\par NO CHF\par NO RECURRENT ANGINA\par DOING WELl\par \par no new cv complaints\par no palps, pre-syncope or syncope\par no edema, chf or exertional chest pain\par

## 2019-02-12 ENCOUNTER — TRANSCRIPTION ENCOUNTER (OUTPATIENT)
Age: 74
End: 2019-02-12

## 2019-02-14 ENCOUNTER — RX RENEWAL (OUTPATIENT)
Age: 74
End: 2019-02-14

## 2019-02-19 ENCOUNTER — MEDICATION RENEWAL (OUTPATIENT)
Age: 74
End: 2019-02-19

## 2019-02-20 RX ORDER — PANTOPRAZOLE 40 MG/1
40 TABLET, DELAYED RELEASE ORAL
Qty: 90 | Refills: 3 | Status: ACTIVE | COMMUNITY
Start: 1900-01-01 | End: 1900-01-01

## 2019-02-28 ENCOUNTER — APPOINTMENT (OUTPATIENT)
Dept: CARDIOLOGY | Facility: CLINIC | Age: 74
End: 2019-02-28

## 2019-02-28 VITALS
HEART RATE: 58 BPM | BODY MASS INDEX: 29.51 KG/M2 | DIASTOLIC BLOOD PRESSURE: 76 MMHG | WEIGHT: 188 LBS | SYSTOLIC BLOOD PRESSURE: 118 MMHG | HEIGHT: 67 IN

## 2019-02-28 NOTE — HISTORY OF PRESENT ILLNESS
[FreeTextEntry1] : 1/16/2019 awmi\par STEMI\par S/P PC	 OF LAD\par STENT THROMBOSIS OF LAD STENT\par EF RETURNED TO NORMAL IN THE HOSPITAL\par \par C/O COUGH \par WILL STOP  LISINOPRIL\par NO CHF\par NO RECURRENT ANGINA\par DOING WELl\par \par no new cv complaints\par no palps, pre-syncope or syncope\par no edema, chf or exertional chest pain\par \par coughing is less off lisinopril\par no chest pain or exertional dyspnea\par no pedal edema or chf symptoms\par no pre-syncope or syncope\par

## 2019-03-03 ENCOUNTER — TRANSCRIPTION ENCOUNTER (OUTPATIENT)
Age: 74
End: 2019-03-03

## 2019-03-15 ENCOUNTER — RX RENEWAL (OUTPATIENT)
Age: 74
End: 2019-03-15

## 2019-04-15 ENCOUNTER — RX RENEWAL (OUTPATIENT)
Age: 74
End: 2019-04-15

## 2019-05-21 ENCOUNTER — OUTPATIENT (OUTPATIENT)
Dept: OUTPATIENT SERVICES | Facility: HOSPITAL | Age: 74
LOS: 1 days | Discharge: HOME | End: 2019-05-21

## 2019-05-21 ENCOUNTER — EMERGENCY (EMERGENCY)
Facility: HOSPITAL | Age: 74
LOS: 0 days | Discharge: HOME | End: 2019-05-22
Admitting: EMERGENCY MEDICINE
Payer: MEDICARE

## 2019-05-21 VITALS
HEART RATE: 69 BPM | TEMPERATURE: 98 F | DIASTOLIC BLOOD PRESSURE: 72 MMHG | SYSTOLIC BLOOD PRESSURE: 139 MMHG | OXYGEN SATURATION: 99 % | RESPIRATION RATE: 18 BRPM

## 2019-05-21 VITALS — WEIGHT: 187.39 LBS

## 2019-05-21 DIAGNOSIS — B19.20 UNSPECIFIED VIRAL HEPATITIS C WITHOUT HEPATIC COMA: ICD-10-CM

## 2019-05-21 DIAGNOSIS — Z79.899 OTHER LONG TERM (CURRENT) DRUG THERAPY: ICD-10-CM

## 2019-05-21 DIAGNOSIS — E03.1 CONGENITAL HYPOTHYROIDISM WITHOUT GOITER: ICD-10-CM

## 2019-05-21 DIAGNOSIS — E11.9 TYPE 2 DIABETES MELLITUS WITHOUT COMPLICATIONS: ICD-10-CM

## 2019-05-21 DIAGNOSIS — N18.2 CHRONIC KIDNEY DISEASE, STAGE 2 (MILD): ICD-10-CM

## 2019-05-21 DIAGNOSIS — E78.00 PURE HYPERCHOLESTEROLEMIA, UNSPECIFIED: ICD-10-CM

## 2019-05-21 DIAGNOSIS — N40.0 BENIGN PROSTATIC HYPERPLASIA WITHOUT LOWER URINARY TRACT SYMPTOMS: ICD-10-CM

## 2019-05-21 DIAGNOSIS — L02.31 CUTANEOUS ABSCESS OF BUTTOCK: ICD-10-CM

## 2019-05-21 DIAGNOSIS — Z90.79 ACQUIRED ABSENCE OF OTHER GENITAL ORGAN(S): Chronic | ICD-10-CM

## 2019-05-21 DIAGNOSIS — D64.9 ANEMIA, UNSPECIFIED: ICD-10-CM

## 2019-05-21 DIAGNOSIS — E78.5 HYPERLIPIDEMIA, UNSPECIFIED: ICD-10-CM

## 2019-05-21 DIAGNOSIS — Z79.82 LONG TERM (CURRENT) USE OF ASPIRIN: ICD-10-CM

## 2019-05-21 DIAGNOSIS — I10 ESSENTIAL (PRIMARY) HYPERTENSION: ICD-10-CM

## 2019-05-21 DIAGNOSIS — I25.10 ATHEROSCLEROTIC HEART DISEASE OF NATIVE CORONARY ARTERY WITHOUT ANGINA PECTORIS: ICD-10-CM

## 2019-05-21 PROCEDURE — 99283 EMERGENCY DEPT VISIT LOW MDM: CPT | Mod: 25

## 2019-05-21 PROCEDURE — 10060 I&D ABSCESS SIMPLE/SINGLE: CPT

## 2019-05-22 RX ORDER — CEPHALEXIN 500 MG
1 CAPSULE ORAL
Qty: 28 | Refills: 0
Start: 2019-05-22 | End: 2019-05-28

## 2019-05-22 NOTE — ED PROVIDER NOTE - NS ED ROS FT
Review of Systems:  	•	CONSTITUTIONAL - no fever, no diaphoresis, no chills  	•	SKIN - +abscess, no rash  	•	HEMATOLOGIC - no bleeding, no bruising  	•	EYES - no eye pain, no blurry vision  	•	ENT - no congestion  	•	RESPIRATORY - no shortness of breath, no cough  	•	CARDIAC - no chest pain, no palpitations  	•	GI - no abd pain, no nausea, no vomiting, no diarrhea, no constipation  	•	GENITO-URINARY - no dysuria; no hematuria, no increased urinary frequency  	•	MUSCULOSKELETAL - no joint paint, no swelling, no redness  	•	NEUROLOGIC - no weakness, no headache, no paresthesias, no LOC  	All other ROS are negative except as documented in HPI.

## 2019-05-22 NOTE — ED PROVIDER NOTE - PROVIDER TOKENS
FREE:[LAST:[Return to ED or PMD in 48 hours for wound check],PHONE:[(   )    -],FAX:[(   )    -],FOLLOWUP:[1-3 Days]]

## 2019-05-22 NOTE — ED PROVIDER NOTE - CLINICAL SUMMARY MEDICAL DECISION MAKING FREE TEXT BOX
Developing abscess to right buttock x 3 days. Incision and drainage done with minimal purulent drainage and bloody fluid. Incision left open. Advised to return to ED in 48 hours for wound check. Keflex given. Discussed wound care and strict return precautions. I have discussed the discharge plan with the patient. The patient agrees with the plan, as discussed.  The patient understands Emergency Department diagnosis is a preliminary diagnosis often based on limited information and that the patient must adhere to the follow-up plan as discussed.  The patient understands that if the symptoms worsen or if prescribed medications do not have the desired/planned effect that the patient may return to the Emergency Department at any time for further evaluation and treatment.

## 2019-05-22 NOTE — ED PROVIDER NOTE - OBJECTIVE STATEMENT
right buttock 3 days 73 yo M with pmhx of CAD, prostate cancer, Hep C, HTN, HLD presenting with pain and swelling to right buttock x 3 days. Symptoms are moderate. No alleviating aggravating factors. No cp, sob, fever, chills, abdominal pain, nausea, vomiting, diarrhea, back pain, urinary symptoms, headache, dizziness, paresthesias, or weakness. No black or bloody stools.

## 2019-05-22 NOTE — ED PROVIDER NOTE - CARE PROVIDER_API CALL
Return to ED or PMD in 48 hours for wound check,   Phone: (   )    -  Fax: (   )    -  Follow Up Time: 1-3 Days

## 2019-05-22 NOTE — ED PROVIDER NOTE - PHYSICAL EXAMINATION
VITAL SIGNS: I have reviewed nursing notes and confirm.  CONSTITUTIONAL: Well-developed; well-nourished; in no acute distress.  SKIN: +3cm tender area of red, induration and mild fluctuance with surrounding erythema. Remainder of skin exam is warm and dry, no acute rash.  HEAD: Normocephalic; atraumatic.  EYES: PERRL, EOM intact; conjunctiva and sclera clear.  ENT: No nasal discharge; airway clear.   NECK: Supple; non tender.  CARD: S1, S2 normal; no murmurs, gallops, or rubs. Regular rate and rhythm.  RESP: Clear to auscultation bilaterally. No wheezes, rales or rhonchi.  ABD: Normal bowel sounds; soft; non-distended; non-tender.   EXT: Normal ROM. No edema.  LYMPH: No acute cervical adenopathy.  NEURO: Alert, oriented. Grossly unremarkable. No focal deficits.  PSYCH: Cooperative, appropriate.

## 2019-05-22 NOTE — ED PROVIDER NOTE - NSFOLLOWUPINSTRUCTIONS_ED_ALL_ED_FT
Return in 48 hours for wound check.     Take antibiotics as prescribed.     Abscess    An abscess is an infected area that contains a collection of pus and debris. It can occur in almost any part of the body and occurs when the tissue gets infection. Symptoms include a painful mass that is red, warm, tender that might break open and have drainage. If your health care provider gave you antibiotics make sure to take the full course and do not stop even if feeling better.     SEEK MEDICAL CARE IF YOU HAVE THE FOLLOWING SYMPTOMS: chills, fever, muscle aches, or red streaking from the area.

## 2019-06-19 ENCOUNTER — APPOINTMENT (OUTPATIENT)
Dept: CARDIOLOGY | Facility: CLINIC | Age: 74
End: 2019-06-19
Payer: MEDICARE

## 2019-06-19 VITALS
SYSTOLIC BLOOD PRESSURE: 122 MMHG | DIASTOLIC BLOOD PRESSURE: 74 MMHG | WEIGHT: 185 LBS | HEIGHT: 67 IN | BODY MASS INDEX: 29.03 KG/M2 | HEART RATE: 55 BPM

## 2019-06-19 PROCEDURE — 99214 OFFICE O/P EST MOD 30 MIN: CPT

## 2019-06-19 PROCEDURE — 93000 ELECTROCARDIOGRAM COMPLETE: CPT

## 2019-06-19 NOTE — PHYSICAL EXAM
[General Appearance - Well Developed] : well developed [Normal Appearance] : normal appearance [Well Groomed] : well groomed [General Appearance - Well Nourished] : well nourished [No Deformities] : no deformities [General Appearance - In No Acute Distress] : no acute distress [Normal Conjunctiva] : the conjunctiva exhibited no abnormalities [Eyelids - No Xanthelasma] : the eyelids demonstrated no xanthelasmas [Normal Oral Mucosa] : normal oral mucosa [No Oral Pallor] : no oral pallor [No Oral Cyanosis] : no oral cyanosis [Normal Jugular Venous A Waves Present] : normal jugular venous A waves present [Normal Jugular Venous V Waves Present] : normal jugular venous V waves present [No Jugular Venous Connor A Waves] : no jugular venous connor A waves [Heart Rate And Rhythm] : heart rate and rhythm were normal [Heart Sounds] : normal S1 and S2 [Murmurs] : no murmurs present [Respiration, Rhythm And Depth] : normal respiratory rhythm and effort [Exaggerated Use Of Accessory Muscles For Inspiration] : no accessory muscle use [Auscultation Breath Sounds / Voice Sounds] : lungs were clear to auscultation bilaterally [Abdomen Soft] : soft [Abdomen Tenderness] : non-tender [Abdomen Mass (___ Cm)] : no abdominal mass palpated [Abnormal Walk] : normal gait [Gait - Sufficient For Exercise Testing] : the gait was sufficient for exercise testing [Nail Clubbing] : no clubbing of the fingernails [Cyanosis, Localized] : no localized cyanosis [Petechial Hemorrhages (___cm)] : no petechial hemorrhages [Skin Color & Pigmentation] : normal skin color and pigmentation [] : no rash [No Venous Stasis] : no venous stasis [Skin Lesions] : no skin lesions [No Skin Ulcers] : no skin ulcer [No Xanthoma] : no  xanthoma was observed [Oriented To Time, Place, And Person] : oriented to person, place, and time [Affect] : the affect was normal [No Anxiety] : not feeling anxious [Mood] : the mood was normal

## 2019-08-16 ENCOUNTER — EMERGENCY (EMERGENCY)
Facility: HOSPITAL | Age: 74
LOS: 0 days | Discharge: HOME | End: 2019-08-16
Attending: EMERGENCY MEDICINE | Admitting: EMERGENCY MEDICINE
Payer: MEDICARE

## 2019-08-16 VITALS
SYSTOLIC BLOOD PRESSURE: 137 MMHG | OXYGEN SATURATION: 99 % | RESPIRATION RATE: 18 BRPM | HEART RATE: 64 BPM | DIASTOLIC BLOOD PRESSURE: 62 MMHG | TEMPERATURE: 98 F

## 2019-08-16 DIAGNOSIS — R04.0 EPISTAXIS: ICD-10-CM

## 2019-08-16 DIAGNOSIS — H11.31 CONJUNCTIVAL HEMORRHAGE, RIGHT EYE: ICD-10-CM

## 2019-08-16 DIAGNOSIS — Z90.79 ACQUIRED ABSENCE OF OTHER GENITAL ORGAN(S): Chronic | ICD-10-CM

## 2019-08-16 LAB
BASOPHILS # BLD AUTO: 0.04 K/UL — SIGNIFICANT CHANGE UP (ref 0–0.2)
BASOPHILS NFR BLD AUTO: 0.4 % — SIGNIFICANT CHANGE UP (ref 0–1)
EOSINOPHIL # BLD AUTO: 0.3 K/UL — SIGNIFICANT CHANGE UP (ref 0–0.7)
EOSINOPHIL NFR BLD AUTO: 3.1 % — SIGNIFICANT CHANGE UP (ref 0–8)
HCT VFR BLD CALC: 44.7 % — SIGNIFICANT CHANGE UP (ref 42–52)
HGB BLD-MCNC: 14.7 G/DL — SIGNIFICANT CHANGE UP (ref 14–18)
IMM GRANULOCYTES NFR BLD AUTO: 0.5 % — HIGH (ref 0.1–0.3)
LYMPHOCYTES # BLD AUTO: 2.3 K/UL — SIGNIFICANT CHANGE UP (ref 1.2–3.4)
LYMPHOCYTES # BLD AUTO: 23.8 % — SIGNIFICANT CHANGE UP (ref 20.5–51.1)
MCHC RBC-ENTMCNC: 28.7 PG — SIGNIFICANT CHANGE UP (ref 27–31)
MCHC RBC-ENTMCNC: 32.9 G/DL — SIGNIFICANT CHANGE UP (ref 32–37)
MCV RBC AUTO: 87.1 FL — SIGNIFICANT CHANGE UP (ref 80–94)
MONOCYTES # BLD AUTO: 0.84 K/UL — HIGH (ref 0.1–0.6)
MONOCYTES NFR BLD AUTO: 8.7 % — SIGNIFICANT CHANGE UP (ref 1.7–9.3)
NEUTROPHILS # BLD AUTO: 6.15 K/UL — SIGNIFICANT CHANGE UP (ref 1.4–6.5)
NEUTROPHILS NFR BLD AUTO: 63.5 % — SIGNIFICANT CHANGE UP (ref 42.2–75.2)
NRBC # BLD: 0 /100 WBCS — SIGNIFICANT CHANGE UP (ref 0–0)
PLATELET # BLD AUTO: 180 K/UL — SIGNIFICANT CHANGE UP (ref 130–400)
RBC # BLD: 5.13 M/UL — SIGNIFICANT CHANGE UP (ref 4.7–6.1)
RBC # FLD: 13.5 % — SIGNIFICANT CHANGE UP (ref 11.5–14.5)
WBC # BLD: 9.68 K/UL — SIGNIFICANT CHANGE UP (ref 4.8–10.8)
WBC # FLD AUTO: 9.68 K/UL — SIGNIFICANT CHANGE UP (ref 4.8–10.8)

## 2019-08-16 PROCEDURE — 99284 EMERGENCY DEPT VISIT MOD MDM: CPT

## 2019-08-16 NOTE — ED PROVIDER NOTE - NSFOLLOWUPINSTRUCTIONS_ED_ALL_ED_FT
Epistaxis (nosebleed)    Nosebleeds are common and can be caused by many conditions, such as injury, infections, dry mucous membranes or dry climate, medicines, nose picking, and home heating and cooling systems. Try controlling your nosebleed by pinching your nose continuously for at least 10 minutes. Avoid lying down while you are having a nosebleed. Sit up and lean forward. Avoid blowing or sniffing your nose for a number of hours after having a nosebleed. Resume your normal activities as you are able, but avoid straining, lifting, or bending at the waist for several days. Maintain humidity in your home by using less air conditioning or by using a humidifier.     If your nose was packed by your health care provider, try to maintain the pack inside of your nose until your health care provider removes it. If a balloon catheter was used to pack your nose, do not cut or remove it unless your health care provider has instructed you to do that.     Aspirin and blood thinners make bleeding more likely. If you are prescribed these medicines and you suffer from nosebleeds, ask your health care provider if you should stop taking the medicines or adjust the dose. Do not stop medicines unless directed by your health care provider     SEEK IMMEDIATE MEDICAL CARE IF YOU HAVE THE FOLLOWING SYMPTOMS: nosebleed lasting longer than 20 minutes, unusual bleeding from or bruising on other parts of your body, dizziness or lightheadedness, nosebleed occurring after a head injury, or fever.

## 2019-08-16 NOTE — ED PROVIDER NOTE - ATTENDING CONTRIBUTION TO CARE
75 yo M pmh of cad with 1 stent presents with epistaxis to the right nare intermittently x 2 weeks. [facility] but for the last 2 weeks has been having intermittent nose bleeds controlled with pressure. Also reports that this morning he noted blood in his right eye. no blurry vision, no pain with eye movements.  no cp, no sob, no n/v/d, no bruising or other areas of bleeding.     CONSTITUTIONAL: Well-developed; well-nourished; in no acute distress.   SKIN: warm, dry  HEAD: Normocephalic; atraumatic.  EYES: PERRL, EOMI, no conjunctival erythema. + right conjunctival hemorrhage.   ENT: No nasal discharge; airway clear. + oozing blood from the right nare. no blood in posterior pharynx.   NECK: Supple; non tender.  CARD: S1, S2 normal;  Regular rate and rhythm.   RESP: No wheezes, rales or rhonchi.  ABD: soft non tender, non distended, no rebound or guarding  EXT: Normal ROM.    LYMPH: No acute cervical adenopathy.  NEURO: Alert, oriented, grossly unremarkable.

## 2019-08-16 NOTE — ED ADULT TRIAGE NOTE - CHIEF COMPLAINT QUOTE
nose bleed and right eye redness, packing in place from patient.  patient reports being taking Prasugrel 10 mgs. patient had MI 1/2019.

## 2019-08-16 NOTE — ED PROVIDER NOTE - CLINICAL SUMMARY MEDICAL DECISION MAKING FREE TEXT BOX
Patient represented with epistaxis and redness in right eye. Found to have subconjunctival hemorrhage. Epistaxis controlled with surgicel. CBC checked, normal hgb level. patient discharged with ent follow up.

## 2019-08-16 NOTE — ED PROVIDER NOTE - PHYSICAL EXAMINATION
VITAL SIGNS: I have reviewed nursing notes and confirm.  CONSTITUTIONAL: Well-developed; well-nourished; in no acute distress.   SKIN:  skin exam is warm and dry, no acute rash.    HEAD: Normocephalic; atraumatic.  EYES: right subconjunctival hemorrhage, PERRL, EOM's intact conjunctiva and sclera clear.  ENT: mild oozing from right nare No nasal discharge; airway clear.  CARD: S1, S2 normal; no murmurs, gallops, or rubs. Regular rate and rhythm.   RESP: No wheezes, rales or rhonchi.  EXT: Normal ROM.  No clubbing, cyanosis or edema.   NEURO: Alert, oriented, grossly unremarkable

## 2019-08-16 NOTE — ED PROVIDER NOTE - CARE PROVIDER_API CALL
Ruchi Oliva)  Otolaryngology  [address] [place] [address], [address] [place] [address]  Phone: [phone]  Fax: [phone]  Follow Up Time:

## 2019-08-16 NOTE — ED PROVIDER NOTE - OBJECTIVE STATEMENT
Pt is a 75y/o male with a pmhx of CAD with 1 stent on aspirin presents today for eval of right nare epistaxis x 1 week. Pt also admits to associated right eye redness that he noticed this morning. Pt denies fever, chills, weakness, numbness, palpitations

## 2019-08-16 NOTE — ED PROVIDER NOTE - NS ED ROS FT
ENMT:  + epistaxis  MS:  No myalgia, muscle weakness, joint pain or back pain.  Neuro:  No headache or weakness.  No LOC.  Skin:  No skin rash.   Endocrine: No history of thyroid disease or diabetes.  Except as documented in the HPI,  all other systems are negative.

## 2019-09-05 ENCOUNTER — EMERGENCY (EMERGENCY)
Facility: HOSPITAL | Age: 74
LOS: 1 days | Discharge: HOME | End: 2019-09-07
Admitting: EMERGENCY MEDICINE
Payer: MEDICARE

## 2019-09-05 VITALS
TEMPERATURE: 98 F | SYSTOLIC BLOOD PRESSURE: 149 MMHG | WEIGHT: 179.9 LBS | DIASTOLIC BLOOD PRESSURE: 69 MMHG | RESPIRATION RATE: 18 BRPM | HEART RATE: 93 BPM | OXYGEN SATURATION: 100 %

## 2019-09-05 DIAGNOSIS — Z95.5 PRESENCE OF CORONARY ANGIOPLASTY IMPLANT AND GRAFT: ICD-10-CM

## 2019-09-05 DIAGNOSIS — Z79.899 OTHER LONG TERM (CURRENT) DRUG THERAPY: ICD-10-CM

## 2019-09-05 DIAGNOSIS — L02.31 CUTANEOUS ABSCESS OF BUTTOCK: ICD-10-CM

## 2019-09-05 DIAGNOSIS — I25.10 ATHEROSCLEROTIC HEART DISEASE OF NATIVE CORONARY ARTERY WITHOUT ANGINA PECTORIS: ICD-10-CM

## 2019-09-05 DIAGNOSIS — E78.5 HYPERLIPIDEMIA, UNSPECIFIED: ICD-10-CM

## 2019-09-05 DIAGNOSIS — Z90.79 ACQUIRED ABSENCE OF OTHER GENITAL ORGAN(S): Chronic | ICD-10-CM

## 2019-09-05 DIAGNOSIS — L03.317 CELLULITIS OF BUTTOCK: ICD-10-CM

## 2019-09-05 PROBLEM — I21.9 ACUTE MYOCARDIAL INFARCTION, UNSPECIFIED: Chronic | Status: ACTIVE | Noted: 2019-08-16

## 2019-09-05 PROCEDURE — 10061 I&D ABSCESS COMP/MULTIPLE: CPT

## 2019-09-05 PROCEDURE — 99283 EMERGENCY DEPT VISIT LOW MDM: CPT | Mod: 25

## 2019-09-05 RX ORDER — CEPHALEXIN 500 MG
1 CAPSULE ORAL
Qty: 28 | Refills: 0
Start: 2019-09-05 | End: 2019-09-11

## 2019-09-05 RX ORDER — ACETAMINOPHEN 500 MG
975 TABLET ORAL ONCE
Refills: 0 | Status: COMPLETED | OUTPATIENT
Start: 2019-09-05 | End: 2019-09-05

## 2019-09-05 RX ORDER — CEPHALEXIN 500 MG
500 CAPSULE ORAL EVERY 12 HOURS
Refills: 0 | Status: DISCONTINUED | OUTPATIENT
Start: 2019-09-05 | End: 2019-09-07

## 2019-09-05 RX ADMIN — Medication 1 TABLET(S): at 04:01

## 2019-09-05 RX ADMIN — Medication 500 MILLIGRAM(S): at 04:01

## 2019-09-05 RX ADMIN — Medication 975 MILLIGRAM(S): at 03:15

## 2019-09-05 NOTE — ED PROVIDER NOTE - CLINICAL SUMMARY MEDICAL DECISION MAKING FREE TEXT BOX
PAtient here for abscess/cellulitis to left buttock, I+D done, wound packed, abx given. Recommend follow up in 48 hour for wound check. Patient here for abscess/cellulitis to left buttock, I+D done, wound packed, abx given. Recommend follow up in 48 hour for wound check.

## 2019-09-05 NOTE — ED PROVIDER NOTE - NS ED ROS FT
Constitutional: no fever, chills or generalized weakness  Cardiovascular: no chest pain, no sob, no syncope , no palpitations, no peripheral edema  Respiratory: no cough, no shortness of breath, no h/o asthma or COPD.  Gastrointestinal: no nausea, vomiting or diarrhea. no abdominal pain  Musculoskeletal: no msk pain or injury.   Integumentary: see hpi  Neurological: no headache, no dizziness, no visual changes, no UE/LE weakness or paresthesias.   Allergic/Immunologic: no lymphadenopathy, no pruritus

## 2019-09-05 NOTE — ED PROVIDER NOTE - OBJECTIVE STATEMENT
75 yo male with h/o CAD, MI, stent om effient, HLD presents tot he ED c/o abscess to left buttock x 3 days. Associated with pain, swelling and erythema to area. Patient admits to having a similar abscess in the past. No trauma to area. Denies fever, chills, chest pain, sob, abd pain, N/V, UE/LE weakness or paresthesias.

## 2019-09-05 NOTE — ED PROVIDER NOTE - PATIENT PORTAL LINK FT
You can access the FollowMyHealth Patient Portal offered by [facility] by registering at the following website: [email or web address]. By joining [employer]’s FollowMyHealth portal, you will also be able to view your health information using other applications (apps) compatible with our system.

## 2019-09-05 NOTE — ED PROVIDER NOTE - PHYSICAL EXAMINATION
GENERAL:  well appearing, non-toxic male in no acute distress  SKIN: skin warm, pink and dry. MMM. + 6 cm abscess to left buttock with surrounding cellulitis. + fluctuance with surrounding induration. + increased warmth. no streaking. no involvement towards rectum. cap refill < 2 sec   PULM: CTAB. Normal respiratory effort. No respiratory distress. No wheezes, stridor, rales or rhonchi. No retractions  CV: RRR, no M/R/G.   ABD: Soft, non-tender, non-distended  MSK: FROM of all extremities.  No MSK tenderness. No edema, erythema, cyanosis.  radial pulses equal and intact bilaterally  NEURO: A+Ox3, no sensory/motor deficits, CN II-XII intact.   PSYCH: appropriate behavior, cooperative.

## 2019-09-05 NOTE — ED PROVIDER NOTE - NSFOLLOWUPINSTRUCTIONS_ED_ALL_ED_FT
Abscess    An abscess is an infected area that contains a collection of pus and debris. It can occur in almost any part of the body and occurs when the tissue gets infection. Symptoms include a painful mass that is red, warm, tender that might break open and HAVE drainage. If your health care provider gave you antibiotics make sure to take the full course and do not stop even if feeling better.     SEEK IMMEDIATE MEDICAL CARE IF YOU HAVE ANY OF THE FOLLOWING SYMPTOMS: chills, fever, muscle aches, or red streaking from the area.    RETURN TO ED in 48 HOURS FOR WOUND CHECK AND PACKING REMOVAL

## 2019-09-07 ENCOUNTER — EMERGENCY (EMERGENCY)
Facility: HOSPITAL | Age: 74
LOS: 0 days | Discharge: HOME | End: 2019-09-07
Admitting: EMERGENCY MEDICINE
Payer: MEDICARE

## 2019-09-07 VITALS
OXYGEN SATURATION: 100 % | TEMPERATURE: 98 F | SYSTOLIC BLOOD PRESSURE: 141 MMHG | DIASTOLIC BLOOD PRESSURE: 98 MMHG | RESPIRATION RATE: 18 BRPM | HEART RATE: 64 BPM

## 2019-09-07 DIAGNOSIS — Z90.79 ACQUIRED ABSENCE OF OTHER GENITAL ORGAN(S): Chronic | ICD-10-CM

## 2019-09-07 DIAGNOSIS — L02.31 CUTANEOUS ABSCESS OF BUTTOCK: ICD-10-CM

## 2019-09-07 DIAGNOSIS — L53.8 OTHER SPECIFIED ERYTHEMATOUS CONDITIONS: ICD-10-CM

## 2019-09-07 DIAGNOSIS — Z48.00 ENCOUNTER FOR CHANGE OR REMOVAL OF NONSURGICAL WOUND DRESSING: ICD-10-CM

## 2019-09-07 PROCEDURE — 99282 EMERGENCY DEPT VISIT SF MDM: CPT

## 2019-09-07 NOTE — ED ADULT NURSE NOTE - NSSUSCREENINGQ3_ED_ALL_ED
S/w mom and she states that pt has a red area on buttocks that she believes may be yeast. Mom states that desitin is not helping . Advised mom that nystatin ointment will be called into pharmacy preferred. Advised mom that id she does not see any improvement in 3-4 days, she should schedule appt.. Mom verbalized understanding. Received verbal orders from  for Nystatin Ointment Apply to affected area 4 xs daily with 1 refill .   No

## 2019-09-07 NOTE — ED PROVIDER NOTE - OBJECTIVE STATEMENT
73 y/o M with PMH of CAD, MI s/p stents, and high cholesterol now presents to the ED for wound checks s/p I&D to L buttock on 9/5. Pt states area sustains “less pain and swelling” No f/c. Pt currently on ABX which he states he is taking as prescribed. 75 y/o M with PMH of CAD, MI s/p stents, and high cholesterol now presents to the ED for wound checks s/p I&D to L buttock on 9/5. Pt states area  “less pain and swelling” No f/c. Pt currently on ABX which he states he is taking as prescribed.

## 2019-09-07 NOTE — ED PROVIDER NOTE - PHYSICAL EXAMINATION
VITAL SIGNS: I have reviewed nursing notes and confirm.  CARD: S1, S2 normal; no murmurs, gallops, or rubs. Regular rate and rhythm.  RESP: No wheezes, rales or rhonchi.  ABD: Normal bowel sounds; soft; non-distended; non-tender; no hepatosplenomegaly.  SKIN: L buttock with open area, no drainage, nontender, mild surrounding erythema.  NEURO: Alert, oriented. Grossly unremarkable. No focal deficits.  PSYCH: Cooperative, appropriate.

## 2019-09-07 NOTE — ED PROVIDER NOTE - PATIENT PORTAL LINK FT
You can access the FollowMyHealth Patient Portal offered by [facility] by registering at the following website: [email or web address]. By joining [employer]’s FollowMyHealth portal, you will also be able to view your health information using other applications (apps) compatible with our system.

## 2019-09-07 NOTE — ED PROVIDER NOTE - NSFOLLOWUPINSTRUCTIONS_ED_ALL_ED_FT
Wound Care    Taking care of your wound properly can help to prevent pain and infection. It can also help your wound to heal more quickly.     HOW TO CARE FOR YOUR WOUND   Take  prescription medicines only as told by your health care provider.  If you were prescribed antibiotic medicine, take or apply it as told by your health care provider. Do not stop using the antibiotic even if your condition improves.  Clean the wound each day or as told by your health care provider.  Wash the wound with mild soap and water.  Rinse the wound with water to remove all soap.  Pat the wound dry with a clean towel. Do not rub it.    How to take care of your wound.  When and how you should change your bandage (dressing).  When you should remove your dressing.  Removing whatever was used to close your wound.  Check your wound every day for signs of infection. Watch for:  Redness, swelling, or pain.  Fluid, blood, or pus.  Keep the dressing dry until your health care provider says it can be removed. Do not take baths, swim, use a hot tub, or do anything that would put your wound underwater until your health care provider approves.  Raise (elevate) the injured area above the level of your heart while you are sitting or lying down.  Do not scratch or pick at the wound.  Keep all follow-up visits as told by your health care provider. This is important.    SEEK MEDICAL CARE IF:    You have a fever.  Your pain is not controlled with medicine.  You have increased redness, swelling, or pain at the site of your wound.  You have fluid, blood, or pus coming from your wound.  You notice a bad smell coming from your wound or your dressing.    SEEK IMMEDIATE MEDICAL CARE IF:  You have a red streak going away from your wound.

## 2019-09-07 NOTE — ED PROVIDER NOTE - NS ED ROS FT
Constitutional: See HPI. No fever/chills.  Skin: Mild pain and swelling to I&D area on L buttocks.  Except as documented in the HPI, all other systems are negative.

## 2019-10-14 ENCOUNTER — OUTPATIENT (OUTPATIENT)
Dept: OUTPATIENT SERVICES | Facility: HOSPITAL | Age: 74
LOS: 1 days | Discharge: HOME | End: 2019-10-14

## 2019-10-14 DIAGNOSIS — E78.00 PURE HYPERCHOLESTEROLEMIA, UNSPECIFIED: ICD-10-CM

## 2019-10-14 DIAGNOSIS — E11.9 TYPE 2 DIABETES MELLITUS WITHOUT COMPLICATIONS: ICD-10-CM

## 2019-10-14 DIAGNOSIS — Z90.79 ACQUIRED ABSENCE OF OTHER GENITAL ORGAN(S): Chronic | ICD-10-CM

## 2019-10-14 DIAGNOSIS — G93.3 POSTVIRAL AND RELATED FATIGUE SYNDROMES: ICD-10-CM

## 2019-10-21 ENCOUNTER — MEDICATION RENEWAL (OUTPATIENT)
Age: 74
End: 2019-10-21

## 2019-10-23 ENCOUNTER — APPOINTMENT (OUTPATIENT)
Dept: CARDIOLOGY | Facility: CLINIC | Age: 74
End: 2019-10-23
Payer: MEDICARE

## 2019-10-23 VITALS
HEIGHT: 67 IN | HEART RATE: 49 BPM | WEIGHT: 191 LBS | SYSTOLIC BLOOD PRESSURE: 126 MMHG | DIASTOLIC BLOOD PRESSURE: 80 MMHG | BODY MASS INDEX: 29.98 KG/M2

## 2019-10-23 PROCEDURE — 99214 OFFICE O/P EST MOD 30 MIN: CPT

## 2019-10-23 PROCEDURE — 93000 ELECTROCARDIOGRAM COMPLETE: CPT

## 2019-10-23 NOTE — PHYSICAL EXAM
[General Appearance - Well Developed] : well developed [Normal Appearance] : normal appearance [Well Groomed] : well groomed [General Appearance - Well Nourished] : well nourished [No Deformities] : no deformities [General Appearance - In No Acute Distress] : no acute distress [Normal Conjunctiva] : the conjunctiva exhibited no abnormalities [Eyelids - No Xanthelasma] : the eyelids demonstrated no xanthelasmas [Normal Oral Mucosa] : normal oral mucosa [No Oral Cyanosis] : no oral cyanosis [No Oral Pallor] : no oral pallor [Normal Jugular Venous A Waves Present] : normal jugular venous A waves present [Normal Jugular Venous V Waves Present] : normal jugular venous V waves present [No Jugular Venous Connor A Waves] : no jugular venous cnonor A waves [Heart Sounds] : normal S1 and S2 [Murmurs] : no murmurs present [Heart Rate And Rhythm] : heart rate and rhythm were normal [Exaggerated Use Of Accessory Muscles For Inspiration] : no accessory muscle use [Respiration, Rhythm And Depth] : normal respiratory rhythm and effort [Auscultation Breath Sounds / Voice Sounds] : lungs were clear to auscultation bilaterally [Abdomen Soft] : soft [Abdomen Tenderness] : non-tender [Abnormal Walk] : normal gait [Abdomen Mass (___ Cm)] : no abdominal mass palpated [Cyanosis, Localized] : no localized cyanosis [Gait - Sufficient For Exercise Testing] : the gait was sufficient for exercise testing [Nail Clubbing] : no clubbing of the fingernails [Petechial Hemorrhages (___cm)] : no petechial hemorrhages [Skin Color & Pigmentation] : normal skin color and pigmentation [] : no rash [No Venous Stasis] : no venous stasis [Skin Lesions] : no skin lesions [No Skin Ulcers] : no skin ulcer [Oriented To Time, Place, And Person] : oriented to person, place, and time [Affect] : the affect was normal [No Xanthoma] : no  xanthoma was observed [No Anxiety] : not feeling anxious [Mood] : the mood was normal

## 2020-05-06 RX ORDER — ATORVASTATIN CALCIUM 80 MG/1
80 TABLET, FILM COATED ORAL
Qty: 90 | Refills: 3 | Status: ACTIVE | COMMUNITY
Start: 1900-01-01 | End: 1900-01-01

## 2020-06-10 ENCOUNTER — APPOINTMENT (OUTPATIENT)
Dept: CARDIOLOGY | Facility: CLINIC | Age: 75
End: 2020-06-10
Payer: MEDICARE

## 2020-06-10 ENCOUNTER — RESULT CHARGE (OUTPATIENT)
Age: 75
End: 2020-06-10

## 2020-06-10 VITALS
SYSTOLIC BLOOD PRESSURE: 120 MMHG | WEIGHT: 195 LBS | TEMPERATURE: 97.6 F | BODY MASS INDEX: 30.54 KG/M2 | DIASTOLIC BLOOD PRESSURE: 74 MMHG | HEART RATE: 54 BPM

## 2020-06-10 PROCEDURE — 99214 OFFICE O/P EST MOD 30 MIN: CPT

## 2020-06-10 PROCEDURE — 93000 ELECTROCARDIOGRAM COMPLETE: CPT

## 2020-06-10 NOTE — PHYSICAL EXAM
[General Appearance - Well Developed] : well developed [Normal Appearance] : normal appearance [Well Groomed] : well groomed [No Deformities] : no deformities [General Appearance - Well Nourished] : well nourished [General Appearance - In No Acute Distress] : no acute distress [Normal Conjunctiva] : the conjunctiva exhibited no abnormalities [Eyelids - No Xanthelasma] : the eyelids demonstrated no xanthelasmas [Normal Oral Mucosa] : normal oral mucosa [No Oral Pallor] : no oral pallor [No Oral Cyanosis] : no oral cyanosis [Normal Jugular Venous A Waves Present] : normal jugular venous A waves present [Normal Jugular Venous V Waves Present] : normal jugular venous V waves present [No Jugular Venous Connor A Waves] : no jugular venous connor A waves [Respiration, Rhythm And Depth] : normal respiratory rhythm and effort [Exaggerated Use Of Accessory Muscles For Inspiration] : no accessory muscle use [Auscultation Breath Sounds / Voice Sounds] : lungs were clear to auscultation bilaterally [Heart Sounds] : normal S1 and S2 [Heart Rate And Rhythm] : heart rate and rhythm were normal [Murmurs] : no murmurs present [Abdomen Soft] : soft [Abdomen Tenderness] : non-tender [Abnormal Walk] : normal gait [Abdomen Mass (___ Cm)] : no abdominal mass palpated [Gait - Sufficient For Exercise Testing] : the gait was sufficient for exercise testing [Cyanosis, Localized] : no localized cyanosis [Nail Clubbing] : no clubbing of the fingernails [Petechial Hemorrhages (___cm)] : no petechial hemorrhages [Skin Color & Pigmentation] : normal skin color and pigmentation [] : no rash [No Venous Stasis] : no venous stasis [Skin Lesions] : no skin lesions [No Skin Ulcers] : no skin ulcer [No Xanthoma] : no  xanthoma was observed [Oriented To Time, Place, And Person] : oriented to person, place, and time [Affect] : the affect was normal [Mood] : the mood was normal [No Anxiety] : not feeling anxious

## 2020-06-10 NOTE — PHYSICAL EXAM
[General Appearance - Well Developed] : well developed [Well Groomed] : well groomed [Normal Appearance] : normal appearance [General Appearance - Well Nourished] : well nourished [No Deformities] : no deformities [General Appearance - In No Acute Distress] : no acute distress [Normal Conjunctiva] : the conjunctiva exhibited no abnormalities [Eyelids - No Xanthelasma] : the eyelids demonstrated no xanthelasmas [Normal Oral Mucosa] : normal oral mucosa [No Oral Pallor] : no oral pallor [No Oral Cyanosis] : no oral cyanosis [Normal Jugular Venous A Waves Present] : normal jugular venous A waves present [Normal Jugular Venous V Waves Present] : normal jugular venous V waves present [No Jugular Venous Connor A Waves] : no jugular venous connor A waves [Respiration, Rhythm And Depth] : normal respiratory rhythm and effort [Exaggerated Use Of Accessory Muscles For Inspiration] : no accessory muscle use [Auscultation Breath Sounds / Voice Sounds] : lungs were clear to auscultation bilaterally [Heart Rate And Rhythm] : heart rate and rhythm were normal [Heart Sounds] : normal S1 and S2 [Murmurs] : no murmurs present [Abdomen Soft] : soft [Abdomen Tenderness] : non-tender [Abdomen Mass (___ Cm)] : no abdominal mass palpated [Abnormal Walk] : normal gait [Gait - Sufficient For Exercise Testing] : the gait was sufficient for exercise testing [Cyanosis, Localized] : no localized cyanosis [Nail Clubbing] : no clubbing of the fingernails [Petechial Hemorrhages (___cm)] : no petechial hemorrhages [Skin Color & Pigmentation] : normal skin color and pigmentation [] : no rash [No Venous Stasis] : no venous stasis [Skin Lesions] : no skin lesions [No Skin Ulcers] : no skin ulcer [No Xanthoma] : no  xanthoma was observed [Oriented To Time, Place, And Person] : oriented to person, place, and time [Affect] : the affect was normal [Mood] : the mood was normal [No Anxiety] : not feeling anxious

## 2020-06-10 NOTE — HISTORY OF PRESENT ILLNESS
[FreeTextEntry1] : 1/16/2019 awmi\par STEMI\par S/P PC	 OF LAD\par STENT THROMBOSIS OF LAD STENT\par EF RETURNED TO NORMAL IN THE HOSPITAL\par \par C/O COUGH \par WILL STOP  LISINOPRIL\par NO CHF\par NO RECURRENT ANGINA\par DOING WELl\par \par no new cv complaints\par no palps, pre-syncope or syncope\par no edema, chf or exertional chest pain\par \par coughing is less off lisinopril\par no chest pain or exertional dyspnea\par no pedal edema or chf symptoms\par no pre-syncope or syncope\par \par no new cardiac symptoms ssince last visit.\par

## 2020-06-10 NOTE — PHYSICAL EXAM
[General Appearance - Well Developed] : well developed [Normal Appearance] : normal appearance [Well Groomed] : well groomed [General Appearance - Well Nourished] : well nourished [No Deformities] : no deformities [Normal Conjunctiva] : the conjunctiva exhibited no abnormalities [General Appearance - In No Acute Distress] : no acute distress [Eyelids - No Xanthelasma] : the eyelids demonstrated no xanthelasmas [Normal Oral Mucosa] : normal oral mucosa [No Oral Pallor] : no oral pallor [No Oral Cyanosis] : no oral cyanosis [Normal Jugular Venous A Waves Present] : normal jugular venous A waves present [Normal Jugular Venous V Waves Present] : normal jugular venous V waves present [No Jugular Venous Connor A Waves] : no jugular venous connor A waves [Respiration, Rhythm And Depth] : normal respiratory rhythm and effort [Exaggerated Use Of Accessory Muscles For Inspiration] : no accessory muscle use [Auscultation Breath Sounds / Voice Sounds] : lungs were clear to auscultation bilaterally [Heart Rate And Rhythm] : heart rate and rhythm were normal [Heart Sounds] : normal S1 and S2 [Murmurs] : no murmurs present [Abdomen Soft] : soft [Abdomen Tenderness] : non-tender [Abdomen Mass (___ Cm)] : no abdominal mass palpated [Abnormal Walk] : normal gait [Gait - Sufficient For Exercise Testing] : the gait was sufficient for exercise testing [Nail Clubbing] : no clubbing of the fingernails [Cyanosis, Localized] : no localized cyanosis [Petechial Hemorrhages (___cm)] : no petechial hemorrhages [Skin Color & Pigmentation] : normal skin color and pigmentation [] : no rash [No Venous Stasis] : no venous stasis [Skin Lesions] : no skin lesions [No Skin Ulcers] : no skin ulcer [No Xanthoma] : no  xanthoma was observed [Oriented To Time, Place, And Person] : oriented to person, place, and time [Affect] : the affect was normal [Mood] : the mood was normal [No Anxiety] : not feeling anxious

## 2020-08-06 ENCOUNTER — APPOINTMENT (OUTPATIENT)
Dept: CARDIOLOGY | Facility: CLINIC | Age: 75
End: 2020-08-06
Payer: MEDICARE

## 2020-08-06 DIAGNOSIS — I21.02 ST ELEVATION (STEMI) MYOCARDIAL INFARCTION INVOLVING LEFT ANTERIOR DESCENDING CORONARY ARTERY: ICD-10-CM

## 2020-08-06 PROCEDURE — 93306 TTE W/DOPPLER COMPLETE: CPT

## 2020-08-06 PROCEDURE — 93015 CV STRESS TEST SUPVJ I&R: CPT

## 2020-10-07 ENCOUNTER — APPOINTMENT (OUTPATIENT)
Dept: CARDIOLOGY | Facility: CLINIC | Age: 75
End: 2020-10-07
Payer: MEDICARE

## 2020-10-07 VITALS
HEART RATE: 53 BPM | SYSTOLIC BLOOD PRESSURE: 118 MMHG | WEIGHT: 202 LBS | HEIGHT: 67 IN | TEMPERATURE: 97.6 F | BODY MASS INDEX: 31.71 KG/M2 | DIASTOLIC BLOOD PRESSURE: 72 MMHG

## 2020-10-07 PROCEDURE — 99214 OFFICE O/P EST MOD 30 MIN: CPT

## 2020-10-07 PROCEDURE — 93000 ELECTROCARDIOGRAM COMPLETE: CPT

## 2020-10-07 NOTE — HISTORY OF PRESENT ILLNESS
[FreeTextEntry1] : 1/16/2019 awmi\par STEMI\par S/P PC	 OF LAD\par STENT THROMBOSIS OF LAD STENT\par EF RETURNED TO NORMAL IN THE HOSPITAL\par \par C/O COUGH \par WILL STOP  LISINOPRIL\par NO CHF\par NO RECURRENT ANGINA\par DOING WELl\par \par no new cv complaints\par no palps, pre-syncope or syncope\par no edema, chf or exertional chest pain\par \par coughing is less off lisinopril\par no chest pain or exertional dyspnea\par no pedal edema or chf symptoms\par no pre-syncope or syncope\par \par no new cardiac symptoms since last visit.\par negative stress test since last visit\par echo - ef is normal\par

## 2020-10-08 ENCOUNTER — OUTPATIENT (OUTPATIENT)
Dept: OUTPATIENT SERVICES | Facility: HOSPITAL | Age: 75
LOS: 1 days | Discharge: HOME | End: 2020-10-08
Payer: MEDICARE

## 2020-10-08 DIAGNOSIS — Z90.79 ACQUIRED ABSENCE OF OTHER GENITAL ORGAN(S): Chronic | ICD-10-CM

## 2020-10-08 DIAGNOSIS — M25.561 PAIN IN RIGHT KNEE: ICD-10-CM

## 2020-10-08 PROCEDURE — 73562 X-RAY EXAM OF KNEE 3: CPT | Mod: 26,RT

## 2021-02-09 ENCOUNTER — OUTPATIENT (OUTPATIENT)
Dept: OUTPATIENT SERVICES | Facility: HOSPITAL | Age: 76
LOS: 1 days | Discharge: HOME | End: 2021-02-09
Payer: MEDICARE

## 2021-02-09 ENCOUNTER — APPOINTMENT (OUTPATIENT)
Dept: OPHTHALMOLOGY | Facility: CLINIC | Age: 76
End: 2021-02-09

## 2021-02-09 DIAGNOSIS — Z90.79 ACQUIRED ABSENCE OF OTHER GENITAL ORGAN(S): Chronic | ICD-10-CM

## 2021-02-09 PROCEDURE — 92202 OPSCPY EXTND ON/MAC DRAW: CPT

## 2021-02-09 PROCEDURE — 92134 CPTRZ OPH DX IMG PST SGM RTA: CPT | Mod: 26

## 2021-02-09 PROCEDURE — 92004 COMPRE OPH EXAM NEW PT 1/>: CPT

## 2021-02-11 DIAGNOSIS — H31.101 CHOROIDAL DEGENERATION, UNSPECIFIED, RIGHT EYE: ICD-10-CM

## 2021-02-11 DIAGNOSIS — H52.4 PRESBYOPIA: ICD-10-CM

## 2021-02-11 DIAGNOSIS — H02.88A MEIBOMIAN GLAND DYSFUNCTION RIGHT EYE, UPPER AND LOWER EYELIDS: ICD-10-CM

## 2021-02-11 DIAGNOSIS — H25.813 COMBINED FORMS OF AGE-RELATED CATARACT, BILATERAL: ICD-10-CM

## 2021-03-24 NOTE — PROGRESS NOTE ADULT - MINUTES
35
35
36
35
Skin Substitute Text: The defect edges were debeveled with a #15 scalpel blade.  Given the location of the defect, shape of the defect and the proximity to free margins a skin substitute graft was deemed most appropriate.  The graft material was trimmed to fit the size of the defect. The graft was then placed in the primary defect and oriented appropriately.

## 2021-05-06 ENCOUNTER — APPOINTMENT (OUTPATIENT)
Dept: UROLOGY | Facility: CLINIC | Age: 76
End: 2021-05-06

## 2021-05-12 ENCOUNTER — APPOINTMENT (OUTPATIENT)
Dept: OPHTHALMOLOGY | Facility: CLINIC | Age: 76
End: 2021-05-12

## 2021-05-25 ENCOUNTER — APPOINTMENT (OUTPATIENT)
Dept: UROLOGY | Facility: CLINIC | Age: 76
End: 2021-05-25
Payer: MEDICARE

## 2021-05-25 DIAGNOSIS — N39.43 POST-VOID DRIBBLING: ICD-10-CM

## 2021-05-25 DIAGNOSIS — R35.0 FREQUENCY OF MICTURITION: ICD-10-CM

## 2021-05-25 DIAGNOSIS — R32 UNSPECIFIED URINARY INCONTINENCE: ICD-10-CM

## 2021-05-25 DIAGNOSIS — Z87.440 PERSONAL HISTORY OF URINARY (TRACT) INFECTIONS: ICD-10-CM

## 2021-05-25 PROCEDURE — 99072 ADDL SUPL MATRL&STAF TM PHE: CPT

## 2021-05-25 PROCEDURE — 99204 OFFICE O/P NEW MOD 45 MIN: CPT

## 2021-05-25 NOTE — HISTORY OF PRESENT ILLNESS
[FreeTextEntry1] : 76-year-old states he is 15-20 years post radical prostatectomy for prostate cancer. PSA in March of 2021 is undetectable. Since the surgery he has complained of urinary incontinence but cannot related to urgency or to stress. Urine just leaks out during the day. He uses one pad per day. He finds these symptoms are bothersome and wants anything can be done about it. He has one UTI in the past. [Urinary Urgency] : no urinary urgency [Urinary Frequency] : no urinary frequency [Straining] : no straining [Dysuria] : no dysuria [Hematuria - Gross] : no gross hematuria

## 2021-05-25 NOTE — REVIEW OF SYSTEMS
[Fever] : no fever [Feeling Poorly] : not feeling poorly [Nasal Discharge] : no nasal discharge [Chest Pain] : no chest pain [Shortness Of Breath] : no shortness of breath [Constipation] : no constipation [Dysuria] : no dysuria

## 2021-05-25 NOTE — ASSESSMENT
[FreeTextEntry1] : Patient with urinary incontinence since radical prostatectomy. To better characterize the incontinence recommend urodynamic evaluation and I discussed risks benefits and alternatives fully with the patient. He understands that this may lead to prescribing exercises, medication, surgical procedures depending on what is found

## 2021-08-13 ENCOUNTER — APPOINTMENT (OUTPATIENT)
Dept: UROLOGY | Facility: CLINIC | Age: 76
End: 2021-08-13
Payer: MEDICARE

## 2021-08-13 ENCOUNTER — LABORATORY RESULT (OUTPATIENT)
Age: 76
End: 2021-08-13

## 2021-08-13 LAB
BILIRUB UR QL STRIP: NORMAL
COLLECTION METHOD: NORMAL
GLUCOSE UR-MCNC: NORMAL
HCG UR QL: 0.2 EU/DL
HGB UR QL STRIP.AUTO: NORMAL
KETONES UR-MCNC: NORMAL
LEUKOCYTE ESTERASE UR QL STRIP: NORMAL
NITRITE UR QL STRIP: NORMAL
PH UR STRIP: 6
PROT UR STRIP-MCNC: NORMAL
SP GR UR STRIP: 1.02

## 2021-08-13 PROCEDURE — 99213 OFFICE O/P EST LOW 20 MIN: CPT

## 2021-08-13 NOTE — ASSESSMENT
S-(situation): Patient registered to Observation. Patient arrived to room 253 via cart from PACU @ 1800.    B-(background): s/p L2-L4 Durotomy    A-(assessment): pt very somnolent, snoring, arouses to voice. Lower back dressing C/D/I. Reports no pain at this time. Continues to have numbness to Right lower leg, good ROM, pedal pulse 2+. Pt maintaining supine position.     R-(recommendations): Orders and observation goals reviewed with pt and wife. Will continue to monitor post op status.    Nursing Observation criteria listed below was met:    Skin issues/needs documented:NA  Isolation needs addressed, if appropriate: NA  Fall Prevention: Education given and documented: Yes  Education Assessment documented:Yes  Education Documented (Pre-existing chronic infection such as, MRSA/VRE need education on admission): No  Medication Reconciliation Complete: Yes  New medication patient education completed and documented (Possible Side Effects of Common Medications handout): Yes  Home medications if not able to send immediately home with family stored here: NA  Reminder note placed in discharge instructions: NA  Patient has discharge needs (If yes, please explain): No             [FreeTextEntry1] : Helder is a 76-year-old male, with a history of prostate cancer status post radical prostatectomy 15 to 20 years ago with recent PSA, in March 2021, which is undetectable.\par \par Patient presents for urodynamic testing today however, has nitrite positive urine.  Currently he is asymptomatic aside from foul-smelling urine.\par \par UA C&S will be sent from the office today will be sent.  Patient will be started on antibiotics prior to urodynamic testing can take through the study, which has been rescheduled.

## 2021-08-13 NOTE — HISTORY OF PRESENT ILLNESS
[FreeTextEntry1] : Helder is a 76-year-old male, with a history of prostate cancer status post radical prostatectomy 15 to 20 years ago with recent PSA, in March 2021, which is undetectable.  He presents today for urodynamic testing has been having urinary incontinence.  Urine today is nitrite positive and urodynamic testing cannot be performed.\par \par He admits to foul-smelling urine however, denies dysuria or changes in urination.

## 2021-08-13 NOTE — REVIEW OF SYSTEMS
The patient has been examined and the H&P has been reviewed:    GH and atypical cytology  ctu negative  psa and free psa normal with pirad 2/negative mri  Here for cysto  Acceptable candidate for procedure as above aT ASC    Anesthesia/Surgery risks, benefits and alternative options discussed and understood by patient/family.          Active Hospital Problems    Diagnosis  POA    Gross hematuria [R31.0]  Yes      Resolved Hospital Problems   No resolved problems to display.      [see HPI] : see HPI [Negative] : Heme/Lymph

## 2021-08-13 NOTE — END OF VISIT
[FreeTextEntry3] : I was immediately available, formulated the treatment plan and agree with the above transcription by the physicians assistant

## 2021-08-17 LAB
APPEARANCE: CLEAR
BILIRUBIN URINE: NEGATIVE
BLOOD URINE: NEGATIVE
COLOR: YELLOW
GLUCOSE QUALITATIVE U: NEGATIVE
KETONES URINE: NEGATIVE
LEUKOCYTE ESTERASE URINE: NEGATIVE
NITRITE URINE: POSITIVE
PH URINE: 6
PROTEIN URINE: NORMAL
SPECIFIC GRAVITY URINE: 1.02
UROBILINOGEN URINE: NORMAL

## 2021-08-31 NOTE — ED ADULT TRIAGE NOTE - MODE OF ARRIVAL
Private Vehicle Hemigard Intro: Due to skin fragility and wound tension, it was decided to use HEMIGARD adhesive retention suture devices to permit a linear closure. The skin was cleaned and dried for a 6cm distance away from the wound. Excessive hair, if present, was removed to allow for adhesion.

## 2021-09-03 ENCOUNTER — APPOINTMENT (OUTPATIENT)
Dept: UROLOGY | Facility: CLINIC | Age: 76
End: 2021-09-03
Payer: MEDICARE

## 2021-09-03 VITALS — HEIGHT: 67 IN | BODY MASS INDEX: 32.18 KG/M2 | WEIGHT: 205 LBS

## 2021-09-03 LAB
BILIRUB UR QL STRIP: NORMAL
CLARITY UR: CLEAR
COLLECTION METHOD: NORMAL
GLUCOSE UR-MCNC: NORMAL
HCG UR QL: 0.2 EU/DL
HGB UR QL STRIP.AUTO: NORMAL
KETONES UR-MCNC: NORMAL
LEUKOCYTE ESTERASE UR QL STRIP: NORMAL
NITRITE UR QL STRIP: NORMAL
PH UR STRIP: 5.5
PROT UR STRIP-MCNC: NORMAL
SP GR UR STRIP: 1.02

## 2021-09-03 PROCEDURE — 51797 INTRAABDOMINAL PRESSURE TEST: CPT

## 2021-09-03 PROCEDURE — 51784 ANAL/URINARY MUSCLE STUDY: CPT

## 2021-09-03 PROCEDURE — 51728 CYSTOMETROGRAM W/VP: CPT

## 2021-09-03 PROCEDURE — 51741 ELECTRO-UROFLOWMETRY FIRST: CPT

## 2021-10-05 ENCOUNTER — APPOINTMENT (OUTPATIENT)
Dept: UROLOGY | Facility: CLINIC | Age: 76
End: 2021-10-05
Payer: MEDICARE

## 2021-10-05 VITALS — BODY MASS INDEX: 31.71 KG/M2 | WEIGHT: 202 LBS | HEIGHT: 67 IN

## 2021-10-05 PROCEDURE — 99214 OFFICE O/P EST MOD 30 MIN: CPT

## 2021-10-05 NOTE — HISTORY OF PRESENT ILLNESS
[FreeTextEntry1] : 76-year-old with history of radical prostatectomy.  He has urinary incontinence and urodynamics showed decreased compliance and bladder overactivity but no sphincteric incontinence.  He was started on oxybutynin ER 5 but he got itchy and stopped it

## 2021-10-28 ENCOUNTER — TRANSCRIPTION ENCOUNTER (OUTPATIENT)
Age: 76
End: 2021-10-28

## 2022-02-26 NOTE — DISCHARGE NOTE ADULT - PHYSICIAN SECTION COMPLETE
Patient: Erlinda Verde    Procedure Summary     Date: 02/26/22 Room / Location: [place] [address] / SURGERY [facility]    Anesthesia Start: 1032 Anesthesia Stop: 1207    Procedure: HEMIARTHROPLASTY, HIP (Left Hip) Diagnosis: (left femoral neck fracture)    Surgeons: Ubaldo Marquis M.D. Responsible Provider: Paul De La Torre M.D.    Anesthesia Type: general ASA Status: 3          Final Anesthesia Type: general  Last vitals  BP   Blood Pressure: (!) 96/51    Temp   36.3 °C (97.4 °F)    Pulse   94   Resp   17    SpO2   100 %      Anesthesia Post Evaluation    Patient location during evaluation: PACU  Patient participation: complete - patient participated  Level of consciousness: awake and alert  Pain score: 0 (patient mentally incapable of communicating pain level)    Airway patency: patent  Anesthetic complications: no  Cardiovascular status: hemodynamically stable  Respiratory status: acceptable  Hydration status: euvolemic    PONV: none          No complications documented.     Nurse Pain Score: 0 (NPRS)         Yes

## 2022-03-21 NOTE — ED ADULT NURSE NOTE - PAIN: BODY LOCATION
From: Chio Greco  To: Mayra Velásquez DO  Sent: 3/19/2022 3:43 PM CDT  Subject: Labs orders    Hi Dr. Tomas Bryant   Can you place lab orders for [facility] so that I can go there going forward? Thanks! L chest

## 2022-04-06 NOTE — ED ADULT TRIAGE NOTE - AS O2 DELIVERY
[facility])  Discharge Final Note    Primary Care Provider: Francis Rodriguez MD    Expected Discharge Date: 4/6/2022    Final Discharge Note (most recent)     Final Note - 04/06/22 1150        Final Note    Assessment Type Final Discharge Note     Anticipated Discharge Disposition Home or Self Care     What phone number can be called within the next 1-3 days to see how you are doing after discharge? [phone]     Hospital Resources/Appts/Education Provided Provided patient/caregiver with written discharge plan information;Appointments scheduled by Navigator/Coordinator               Going to [facility]    Future Appointments   Date Time Provider Department Center   4/7/2022  1:30 PM Светлана Agarwal PA-C [facility]   4/20/2022 11:30 AM Светлана Agarwal PA-C [facility]   5/18/2022 11:30 AM Sri Roberts MD [facility]   5/18/2022  1:00 PM [facility]   5/18/2022  2:30 PM Su Gaytan NP [facility] [id number] Eligio indira                     room air

## 2022-05-18 ENCOUNTER — APPOINTMENT (OUTPATIENT)
Dept: UROLOGY | Facility: CLINIC | Age: 77
End: 2022-05-18
Payer: MEDICARE

## 2022-05-18 VITALS — HEIGHT: 67 IN | WEIGHT: 200 LBS | BODY MASS INDEX: 31.39 KG/M2

## 2022-05-18 PROCEDURE — 99214 OFFICE O/P EST MOD 30 MIN: CPT

## 2022-05-18 NOTE — HISTORY OF PRESENT ILLNESS
[FreeTextEntry1] : 77-year-old male with history of radical prostatectomy approximately 20 years ago.  His last PSA March 2021 was undetectable.  He has chief complaint of urinary incontinence.  Urodynamics in past showed decreased compliance and bladder overactivity but no sphincteric incontinence.  He was started on oxybutynin ER 5 mg but discontinued due to dizziness and side effects.  He was also trialed on VESIcare 5 mg which patient states did not alleviate his symptoms.\par \par Patient states that he leaks small amount of urine and was using at maximum 2-3 pads a day.  He denies dysuria and gross hematuria.

## 2022-05-18 NOTE — END OF VISIT
[FreeTextEntry3] : I participated in obtaining the history, exam and agree with the above transcription by the physicians assistant

## 2022-05-18 NOTE — ASSESSMENT
[FreeTextEntry1] : 77-year-old history of radical prostatectomy with urinary incontinence.  Educated patient on Kegel exercises.  Patient would like to try a different medication.  He has tried oxybutynin and Vesicare in the past without relief of symptoms.  We will try Myrbetriq 25 mg.  Patient made aware of side effects of headache and elevated blood pressure.\par \par Plan\par Follow-up 3 months with PSA.  Reassess incontinence at this time.

## 2022-05-26 ENCOUNTER — APPOINTMENT (OUTPATIENT)
Dept: CARDIOLOGY | Facility: CLINIC | Age: 77
End: 2022-05-26
Payer: MEDICARE

## 2022-05-26 ENCOUNTER — RESULT CHARGE (OUTPATIENT)
Age: 77
End: 2022-05-26

## 2022-05-26 VITALS
HEART RATE: 56 BPM | WEIGHT: 209 LBS | BODY MASS INDEX: 32.8 KG/M2 | SYSTOLIC BLOOD PRESSURE: 120 MMHG | HEIGHT: 67 IN | DIASTOLIC BLOOD PRESSURE: 72 MMHG

## 2022-05-26 LAB — PSA SERPL-MCNC: <0.01 NG/ML

## 2022-05-26 PROCEDURE — 93000 ELECTROCARDIOGRAM COMPLETE: CPT

## 2022-05-26 PROCEDURE — 99213 OFFICE O/P EST LOW 20 MIN: CPT

## 2022-05-26 RX ORDER — METOPROLOL TARTRATE 100 MG/1
100 TABLET, FILM COATED ORAL DAILY
Refills: 0 | Status: ACTIVE | COMMUNITY

## 2022-05-26 NOTE — HISTORY OF PRESENT ILLNESS
[FreeTextEntry1] : 1/16/2019 awmi\par STEMI\par S/P PC	 OF LAD\par STENT THROMBOSIS OF LAD STENT\par EF RETURNED TO NORMAL IN THE HOSPITAL\par \par C/O COUGH \par WILL STOP  LISINOPRIL\par NO CHF\par NO RECURRENT ANGINA\par DOING WELl\par \par no new cv complaints\par no palps, pre-syncope or syncope\par no edema, chf or exertional chest pain\par \par coughing is less off lisinopril\par no chest pain or exertional dyspnea\par no pedal edema or chf symptoms\par no pre-syncope or syncope\par \par no new cardiac symptoms since last visit.\par negative stress test since last visit 2020\par echo - ef is normal 2020 \par EKG reveals QS pattern V1 to V3 consistent with an anterior wall myocardial infarction.\par \par Patient denies symptoms of exertional dyspnea, shortness of breath or CHF.\par No evidence of PVD, TIA or CVA.\par Reviewed renewed medications with patient.  He is compliant with his medication.  No further change in medication warranted at the present time.\par

## 2022-05-26 NOTE — PHYSICAL EXAM
[Well Developed] : well developed [Well Nourished] : well nourished [No Acute Distress] : no acute distress [Normal Venous Pressure] : normal venous pressure [No Carotid Bruit] : no carotid bruit [Normal S1, S2] : normal S1, S2 [No Murmur] : no murmur [No Rub] : no rub [No Gallop] : no gallop [Clear Lung Fields] : clear lung fields [Good Air Entry] : good air entry [No Respiratory Distress] : no respiratory distress  [Soft] : abdomen soft [Non Tender] : non-tender [No Masses/organomegaly] : no masses/organomegaly [Normal Bowel Sounds] : normal bowel sounds [Normal Gait] : normal gait [No Edema] : no edema [No Cyanosis] : no cyanosis [No Clubbing] : no clubbing [No Varicosities] : no varicosities [No Rash] : no rash [No Skin Lesions] : no skin lesions [Moves all extremities] : moves all extremities [No Focal Deficits] : no focal deficits [Normal Speech] : normal speech [Alert and Oriented] : alert and oriented [Normal memory] : normal memory [General Appearance - Well Developed] : well developed [Normal Appearance] : normal appearance [Well Groomed] : well groomed [General Appearance - Well Nourished] : well nourished [No Deformities] : no deformities [General Appearance - In No Acute Distress] : no acute distress [Normal Conjunctiva] : the conjunctiva exhibited no abnormalities [Eyelids - No Xanthelasma] : the eyelids demonstrated no xanthelasmas [Normal Oral Mucosa] : normal oral mucosa [No Oral Pallor] : no oral pallor [No Oral Cyanosis] : no oral cyanosis [Normal Jugular Venous A Waves Present] : normal jugular venous A waves present [Normal Jugular Venous V Waves Present] : normal jugular venous V waves present [No Jugular Venous Connor A Waves] : no jugular venous connor A waves [Respiration, Rhythm And Depth] : normal respiratory rhythm and effort [Exaggerated Use Of Accessory Muscles For Inspiration] : no accessory muscle use [Auscultation Breath Sounds / Voice Sounds] : lungs were clear to auscultation bilaterally [Heart Rate And Rhythm] : heart rate and rhythm were normal [Heart Sounds] : normal S1 and S2 [Murmurs] : no murmurs present [Abdomen Soft] : soft [Abdomen Tenderness] : non-tender [Abdomen Mass (___ Cm)] : no abdominal mass palpated [Abnormal Walk] : normal gait [Gait - Sufficient For Exercise Testing] : the gait was sufficient for exercise testing [Nail Clubbing] : no clubbing of the fingernails [Cyanosis, Localized] : no localized cyanosis [Petechial Hemorrhages (___cm)] : no petechial hemorrhages [Skin Color & Pigmentation] : normal skin color and pigmentation [] : no rash [No Venous Stasis] : no venous stasis [Skin Lesions] : no skin lesions [No Skin Ulcers] : no skin ulcer [No Xanthoma] : no  xanthoma was observed [Oriented To Time, Place, And Person] : oriented to person, place, and time [Affect] : the affect was normal [Mood] : the mood was normal [No Anxiety] : not feeling anxious

## 2022-06-01 ENCOUNTER — NON-APPOINTMENT (OUTPATIENT)
Age: 77
End: 2022-06-01

## 2022-06-01 RX ORDER — FESOTERODINE FUMARATE 4 MG/1
4 TABLET, FILM COATED, EXTENDED RELEASE ORAL
Qty: 30 | Refills: 5 | Status: ACTIVE | COMMUNITY
Start: 2022-06-01 | End: 1900-01-01

## 2022-06-10 ENCOUNTER — APPOINTMENT (OUTPATIENT)
Dept: OTOLARYNGOLOGY | Facility: CLINIC | Age: 77
End: 2022-06-10
Payer: MEDICARE

## 2022-06-10 DIAGNOSIS — H90.3 SENSORINEURAL HEARING LOSS, BILATERAL: ICD-10-CM

## 2022-06-10 DIAGNOSIS — H93.13 TINNITUS, BILATERAL: ICD-10-CM

## 2022-06-10 PROCEDURE — 92550 TYMPANOMETRY & REFLEX THRESH: CPT

## 2022-06-10 PROCEDURE — 92557 COMPREHENSIVE HEARING TEST: CPT

## 2022-06-10 PROCEDURE — 99203 OFFICE O/P NEW LOW 30 MIN: CPT

## 2022-06-10 NOTE — ASSESSMENT
[FreeTextEntry1] : Reviewed audiogram\par Discussed hearing protection\par Clear for HAs AU\par HAE\par Discussed etiology of tinnitus and treatment, including masking and possibility of pharmacotherapy\par Reviewed relationship between tinnitus and mental/physical stressors\par RV prn

## 2022-06-10 NOTE — HISTORY OF PRESENT ILLNESS
[de-identified] : Patient presents today c/o tinnitus/ clogged ears and hearing loss.    Patient states he has a white noise sound in both ears.  It started a while ago.  He has muffled hearing in both ears.     He denies any pain. 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 No prior HAs.

## 2022-06-10 NOTE — DATA REVIEWED
[de-identified] : Audiogram\par Right: Mild to mod severe SNHL, type A tymp, WRS 90%\par Left: Mod to mod severe SNHL, type A tymp, %\par

## 2022-08-23 ENCOUNTER — APPOINTMENT (OUTPATIENT)
Dept: UROLOGY | Facility: CLINIC | Age: 77
End: 2022-08-23

## 2022-08-23 VITALS
HEIGHT: 67 IN | SYSTOLIC BLOOD PRESSURE: 115 MMHG | BODY MASS INDEX: 31.39 KG/M2 | HEART RATE: 58 BPM | WEIGHT: 200 LBS | DIASTOLIC BLOOD PRESSURE: 75 MMHG

## 2022-08-23 DIAGNOSIS — C61 MALIGNANT NEOPLASM OF PROSTATE: ICD-10-CM

## 2022-08-23 DIAGNOSIS — R32 UNSPECIFIED URINARY INCONTINENCE: ICD-10-CM

## 2022-08-23 PROCEDURE — 99214 OFFICE O/P EST MOD 30 MIN: CPT

## 2022-08-23 NOTE — HISTORY OF PRESENT ILLNESS
[FreeTextEntry1] : 77-year-old with history of radical prostatectomy for prostate cancer 20 years ago and his PSA remains undetectable\par \par Patient has a complaint of urinary incontinence.  Urodynamics showed decreased compliance and overactive bladder but no sphincteric incontinence.  He discontinued oxybutynin ER due to dizziness.  He tried Vesicare which did not alleviate his symptoms.  He was prescribed Myrbetriq but did not take it because it was expensive.  He was then prescribed Toviaz and did not take it and is not sure why but may have been related to expense also.  He would like to try a different medication.

## 2022-08-23 NOTE — ASSESSMENT
[FreeTextEntry1] : Patient bothered by his urinary incontinence would like to try another medication that is generic.  We will try trospium ER.  Return to office 3 months to reassess

## 2022-10-16 ENCOUNTER — EMERGENCY (EMERGENCY)
Facility: HOSPITAL | Age: 77
LOS: 0 days | Discharge: HOME | End: 2022-10-17
Attending: STUDENT IN AN ORGANIZED HEALTH CARE EDUCATION/TRAINING PROGRAM | Admitting: STUDENT IN AN ORGANIZED HEALTH CARE EDUCATION/TRAINING PROGRAM

## 2022-10-16 VITALS
HEIGHT: 67 IN | WEIGHT: 199.96 LBS | OXYGEN SATURATION: 98 % | RESPIRATION RATE: 19 BRPM | SYSTOLIC BLOOD PRESSURE: 187 MMHG | HEART RATE: 67 BPM | DIASTOLIC BLOOD PRESSURE: 94 MMHG | TEMPERATURE: 98 F

## 2022-10-16 DIAGNOSIS — Z95.5 PRESENCE OF CORONARY ANGIOPLASTY IMPLANT AND GRAFT: ICD-10-CM

## 2022-10-16 DIAGNOSIS — K80.20 CALCULUS OF GALLBLADDER WITHOUT CHOLECYSTITIS WITHOUT OBSTRUCTION: ICD-10-CM

## 2022-10-16 DIAGNOSIS — Z79.02 LONG TERM (CURRENT) USE OF ANTITHROMBOTICS/ANTIPLATELETS: ICD-10-CM

## 2022-10-16 DIAGNOSIS — Z85.46 PERSONAL HISTORY OF MALIGNANT NEOPLASM OF PROSTATE: ICD-10-CM

## 2022-10-16 DIAGNOSIS — E78.5 HYPERLIPIDEMIA, UNSPECIFIED: ICD-10-CM

## 2022-10-16 DIAGNOSIS — R11.0 NAUSEA: ICD-10-CM

## 2022-10-16 DIAGNOSIS — R10.13 EPIGASTRIC PAIN: ICD-10-CM

## 2022-10-16 DIAGNOSIS — I10 ESSENTIAL (PRIMARY) HYPERTENSION: ICD-10-CM

## 2022-10-16 DIAGNOSIS — Z79.82 LONG TERM (CURRENT) USE OF ASPIRIN: ICD-10-CM

## 2022-10-16 DIAGNOSIS — I25.10 ATHEROSCLEROTIC HEART DISEASE OF NATIVE CORONARY ARTERY WITHOUT ANGINA PECTORIS: ICD-10-CM

## 2022-10-16 DIAGNOSIS — K21.9 GASTRO-ESOPHAGEAL REFLUX DISEASE WITHOUT ESOPHAGITIS: ICD-10-CM

## 2022-10-16 DIAGNOSIS — Z90.79 ACQUIRED ABSENCE OF OTHER GENITAL ORGAN(S): Chronic | ICD-10-CM

## 2022-10-16 DIAGNOSIS — I25.2 OLD MYOCARDIAL INFARCTION: ICD-10-CM

## 2022-10-16 PROCEDURE — 99285 EMERGENCY DEPT VISIT HI MDM: CPT

## 2022-10-16 PROCEDURE — 93010 ELECTROCARDIOGRAM REPORT: CPT

## 2022-10-17 VITALS
DIASTOLIC BLOOD PRESSURE: 85 MMHG | HEART RATE: 74 BPM | OXYGEN SATURATION: 98 % | RESPIRATION RATE: 18 BRPM | SYSTOLIC BLOOD PRESSURE: 166 MMHG

## 2022-10-17 LAB
ALBUMIN SERPL ELPH-MCNC: 4.8 G/DL — SIGNIFICANT CHANGE UP (ref 3.5–5.2)
ALP SERPL-CCNC: 102 U/L — SIGNIFICANT CHANGE UP (ref 30–115)
ALT FLD-CCNC: 17 U/L — SIGNIFICANT CHANGE UP (ref 0–41)
ANION GAP SERPL CALC-SCNC: 10 MMOL/L — SIGNIFICANT CHANGE UP (ref 7–14)
AST SERPL-CCNC: 18 U/L — SIGNIFICANT CHANGE UP (ref 0–41)
BASOPHILS # BLD AUTO: 0.06 K/UL — SIGNIFICANT CHANGE UP (ref 0–0.2)
BASOPHILS NFR BLD AUTO: 0.5 % — SIGNIFICANT CHANGE UP (ref 0–1)
BILIRUB SERPL-MCNC: 0.5 MG/DL — SIGNIFICANT CHANGE UP (ref 0.2–1.2)
BUN SERPL-MCNC: 22 MG/DL — HIGH (ref 10–20)
CALCIUM SERPL-MCNC: 9.7 MG/DL — SIGNIFICANT CHANGE UP (ref 8.4–10.5)
CHLORIDE SERPL-SCNC: 103 MMOL/L — SIGNIFICANT CHANGE UP (ref 98–110)
CO2 SERPL-SCNC: 28 MMOL/L — SIGNIFICANT CHANGE UP (ref 17–32)
CREAT SERPL-MCNC: 1.1 MG/DL — SIGNIFICANT CHANGE UP (ref 0.7–1.5)
EGFR: 70 ML/MIN/1.73M2 — SIGNIFICANT CHANGE UP
EOSINOPHIL # BLD AUTO: 0.17 K/UL — SIGNIFICANT CHANGE UP (ref 0–0.7)
EOSINOPHIL NFR BLD AUTO: 1.4 % — SIGNIFICANT CHANGE UP (ref 0–8)
GLUCOSE SERPL-MCNC: 153 MG/DL — HIGH (ref 70–99)
HCT VFR BLD CALC: 45.4 % — SIGNIFICANT CHANGE UP (ref 42–52)
HGB BLD-MCNC: 15 G/DL — SIGNIFICANT CHANGE UP (ref 14–18)
IMM GRANULOCYTES NFR BLD AUTO: 0.3 % — SIGNIFICANT CHANGE UP (ref 0.1–0.3)
LYMPHOCYTES # BLD AUTO: 1.46 K/UL — SIGNIFICANT CHANGE UP (ref 1.2–3.4)
LYMPHOCYTES # BLD AUTO: 12.4 % — LOW (ref 20.5–51.1)
MCHC RBC-ENTMCNC: 28.9 PG — SIGNIFICANT CHANGE UP (ref 27–31)
MCHC RBC-ENTMCNC: 33 G/DL — SIGNIFICANT CHANGE UP (ref 32–37)
MCV RBC AUTO: 87.5 FL — SIGNIFICANT CHANGE UP (ref 80–94)
MONOCYTES # BLD AUTO: 0.72 K/UL — HIGH (ref 0.1–0.6)
MONOCYTES NFR BLD AUTO: 6.1 % — SIGNIFICANT CHANGE UP (ref 1.7–9.3)
NEUTROPHILS # BLD AUTO: 9.33 K/UL — HIGH (ref 1.4–6.5)
NEUTROPHILS NFR BLD AUTO: 79.3 % — HIGH (ref 42.2–75.2)
NRBC # BLD: 0 /100 WBCS — SIGNIFICANT CHANGE UP (ref 0–0)
NT-PROBNP SERPL-SCNC: 381 PG/ML — HIGH (ref 0–300)
PLATELET # BLD AUTO: 205 K/UL — SIGNIFICANT CHANGE UP (ref 130–400)
POTASSIUM SERPL-MCNC: 4.4 MMOL/L — SIGNIFICANT CHANGE UP (ref 3.5–5)
POTASSIUM SERPL-SCNC: 4.4 MMOL/L — SIGNIFICANT CHANGE UP (ref 3.5–5)
PROT SERPL-MCNC: 7.9 G/DL — SIGNIFICANT CHANGE UP (ref 6–8)
RBC # BLD: 5.19 M/UL — SIGNIFICANT CHANGE UP (ref 4.7–6.1)
RBC # FLD: 13.2 % — SIGNIFICANT CHANGE UP (ref 11.5–14.5)
SODIUM SERPL-SCNC: 141 MMOL/L — SIGNIFICANT CHANGE UP (ref 135–146)
TROPONIN T SERPL-MCNC: <0.01 NG/ML — SIGNIFICANT CHANGE UP
WBC # BLD: 11.78 K/UL — HIGH (ref 4.8–10.8)
WBC # FLD AUTO: 11.78 K/UL — HIGH (ref 4.8–10.8)

## 2022-10-17 PROCEDURE — 76705 ECHO EXAM OF ABDOMEN: CPT | Mod: 26

## 2022-10-17 PROCEDURE — 71045 X-RAY EXAM CHEST 1 VIEW: CPT | Mod: 26

## 2022-10-17 RX ORDER — FAMOTIDINE 10 MG/ML
20 INJECTION INTRAVENOUS ONCE
Refills: 0 | Status: COMPLETED | OUTPATIENT
Start: 2022-10-17 | End: 2022-10-17

## 2022-10-17 RX ORDER — ONDANSETRON 8 MG/1
4 TABLET, FILM COATED ORAL ONCE
Refills: 0 | Status: COMPLETED | OUTPATIENT
Start: 2022-10-17 | End: 2022-10-17

## 2022-10-17 RX ADMIN — FAMOTIDINE 20 MILLIGRAM(S): 10 INJECTION INTRAVENOUS at 01:24

## 2022-10-17 NOTE — ED PROVIDER NOTE - PROGRESS NOTE DETAILS
SR: ebony received from dr. caridad orozco imaging and reassessment, SR: patient re-evaluated, patient states symptoms resolved all results discussed with patient. Strict return precautions provided.

## 2022-10-17 NOTE — ED PROVIDER NOTE - NSICDXFAMILYHX_GEN_ALL_CORE_FT
FAMILY HISTORY:  Sibling  Still living? Yes, Estimated age: Age Unknown  Family history of heart attack, Age at diagnosis: Age Unknown

## 2022-10-17 NOTE — ED PROVIDER NOTE - NS ED ROS FT
Constitutional: No fever, chills.  Eyes: No visual changes.  ENT: No hearing changes.  Neck: No neck pain or stiffness.  Cardiovascular: + lower chest pain  Pulmonary: No SOB, cough. No hemoptysis.  Abdominal:  epigastric and RUQ abdominal pain  : No dysuria, frequency.  Neuro: No headache, syncope, dizziness.  MS: No back pain. No calf pain/swelling.  Psych: No suicidal ideations.

## 2022-10-17 NOTE — ED ADULT NURSE NOTE - NSIMPLEMENTINTERV_GEN_ALL_ED
Implemented All Universal Safety Interventions:  [place] to call system. Call bell, personal items and telephone within reach. Instruct patient to call for assistance. Room bathroom lighting operational. Non-slip footwear when patient is off stretcher. Physically safe environment: no spills, clutter or unnecessary equipment. Stretcher in lowest position, wheels locked, appropriate side rails in place.

## 2022-10-17 NOTE — ED PROVIDER NOTE - OBJECTIVE STATEMENT
77 yold male to Ed Pmhx Cad s/p lad stent 1/19, Htn, Hld, gerd c/o epigastric abdominal pain radiating to RUQ and chest area associated with mild nausea, burping/belching; pt with hx of cad so he came in for eval; sx different than prior acs type pain; pt denies fever, chills, cough, diaphoreisis, neck or back pain; pt deneis prior gb issues currently takes pantoprazole for gerd;

## 2022-10-17 NOTE — ED PROVIDER NOTE - PROVIDER TOKENS
PROVIDER:[TOKEN:[[id number]],FOLLOWUP:[1-3 Days]],PROVIDER:[TOKEN:[[id number]],FOLLOWUP:[1-3 Days]]

## 2022-10-17 NOTE — ED PROVIDER NOTE - CARE PROVIDER_API CALL
Chacho Brown)  Surgery; Surgical Critical Care  [address] [place] [address]  Phone: [phone]  Fax: [phone]  Follow Up Time: 1-3 Days    Mel Hancock)  Surgery; Surgical Critical Care  [address] [facility], [address] [place] [id number]  Phone: [phone]  Fax: [phone]  Follow Up Time: 1-3 Days

## 2022-10-17 NOTE — ED ADULT NURSE NOTE - TEMPLATE
What is your communicable disease history:  · none    Do you know how to avoid getting:    · STD's: Yes:    · Hepatitis A, B, C: Yes    · COVID: Yes:    · TB: Yes:    · HIV: Yes:    · Flu: Yes:    Nursing supplied education to patient on the following topics:   · COVID     Education included:   · Hand washing    Patient accepted education: Yes     Cardiac

## 2022-10-17 NOTE — ED PROVIDER NOTE - CLINICAL SUMMARY MEDICAL DECISION MAKING FREE TEXT BOX
77 yold male to Ed Pmhx Cad s/p lad stent 1/19, Htn, Hld, gerd c/o epigastric abdominal pain radiating to RUQ and chest area associated with mild nausea, burping/belching; vs reviewed labs imaging ekg obtained and reviewed US demonstrated < from: US Abdomen Upper Quadrant Right (10.17.22 @ 03:38) >Cholelithiasis without sonographic evidence for cholecystitis. patient felt better, abdomen soft non tender non distended. Patient a spoken to in detail about results  All questions addressed.  Results of ED work up discussed and patient given a copy of the results. Patient has proper follow up. Return precautions given. patient to follow up with surgery

## 2022-10-17 NOTE — ED PROVIDER NOTE - NS ED ATTENDING STATEMENT MOD
This was a shared visit with the HELENA. I reviewed and verified the documentation and independently performed the documented:

## 2022-10-17 NOTE — ED PROVIDER NOTE - NSFOLLOWUPINSTRUCTIONS_ED_ALL_ED_FT
Please follow up with surgeon 1-3 days    Gallstones    Gallstones (cholelithiasis) is a form of gallbladder disease in which stones form in your gallbladder. The gallbladder is an organ that stores bile made in the liver, which helps digest fats. Gallstones begin as small bile crystals and slowly grow into stones. Gallstone pain occurs when the gallbladder spasms and a gallstone or sludge is blocking the duct. Pain can also occur when a stone passes out of the duct. Only take over-the-counter or prescription medicines for pain, discomfort, or fever as directed by your health care provider. Follow a low-fat diet until seen again by your health care provider.     SEEK IMMEDIATE MEDICAL CARE IF YOU HAVE ANY OF THE FOLLOWING SYMPTOMS: worsening pain, fever, persistent vomiting, yellowing of the skin or eyes, or altered mental status.

## 2022-10-17 NOTE — ED PROVIDER NOTE - PATIENT PORTAL LINK FT
You can access the FollowMyHealth Patient Portal offered by [facility] by registering at the following website: [email or web address]. By joining [employer]’s FollowMyHealth portal, you will also be able to view your health information using other applications (apps) compatible with our system.

## 2022-10-17 NOTE — ED PROVIDER NOTE - NSICDXPASTMEDICALHX_GEN_ALL_CORE_FT
PAST MEDICAL HISTORY:  MI (myocardial infarction)     Prostate cancer     Stented coronary artery

## 2023-02-02 ENCOUNTER — APPOINTMENT (OUTPATIENT)
Dept: ORTHOPEDIC SURGERY | Facility: CLINIC | Age: 78
End: 2023-02-02
Payer: MEDICARE

## 2023-02-02 VITALS — HEIGHT: 67 IN | WEIGHT: 200 LBS | BODY MASS INDEX: 31.39 KG/M2

## 2023-02-02 DIAGNOSIS — S89.91XA UNSPECIFIED INJURY OF RIGHT LOWER LEG, INITIAL ENCOUNTER: ICD-10-CM

## 2023-02-02 PROCEDURE — 99213 OFFICE O/P EST LOW 20 MIN: CPT

## 2023-02-02 PROCEDURE — 73562 X-RAY EXAM OF KNEE 3: CPT | Mod: RT

## 2023-02-02 RX ORDER — IBUPROFEN 800 MG/1
800 TABLET, FILM COATED ORAL
Qty: 60 | Refills: 0 | Status: ACTIVE | COMMUNITY
Start: 2023-02-02 | End: 1900-01-01

## 2023-02-02 RX ORDER — TRAMADOL HYDROCHLORIDE 50 MG/1
50 TABLET, COATED ORAL
Qty: 14 | Refills: 0 | Status: ACTIVE | COMMUNITY
Start: 2023-02-02 | End: 1900-01-01

## 2023-02-02 NOTE — DISCUSSION/SUMMARY
[de-identified] : Impression: Contusion to the right knee and aggravated osteoarthritis\par \par Plan: Patient was advised for the use of knee brace or an Ace bandage for support and stability.\par Patient was advised to apply ice, activities as tolerated.\par Patient was advised for anti-inflammatory for pain, patient is also requesting pain medication for breakthrough pain, prescription for tramadol will be sent to the pharmacy for 1 week.\par Patient was advised that if the pain persist he can follow-up in about 4 to 6 weeks.\par \par Follow-up: 4 to 6 weeks if needed\par \par Supervising physician Dr. Ma

## 2023-02-02 NOTE — IMAGING
[de-identified] : Examination of right knee:\par Swelling -mild generalized swelling\par Ecchymosis -none\par Effusion -none\par Tenderness over lateral aspect of the knee.  Nontender over the medial joint line.\par Nontender about the patella.\par Nontender over the tibial plateau.\par Motor strength 5/5\par Range of motion -good range of motion with minimal end of range pain\par No laxity to valgus and varus stress.\par Active straight leg raise -positive\par Anterior drawer–negative\par Calf soft nontender.\par Neurovascular intact.\par Gait -mildly antalgic\par \par X-ray of the right knee was done in the office today, and his x-ray was negative for any acute fracture or dislocation, moderate joint narrowing space in the 3 compartments, and the medial compartment has moderate to severe joint narrowing \par

## 2023-02-02 NOTE — HISTORY OF PRESENT ILLNESS
[de-identified] : 77-year-old male here for an evaluation of injury sustained to the right knee, patient stated that this injury happened yesterday February 1, 2023.\par Patient fell down landing on his knee, patient states that since then he is having Significant Pain in His Knee, His Level of Pain Is 8/10.\par Patient admits to history of arthritis to the left knee.\par He also admits to history of meniscectomy.  \par Patient states that he has pain with ambulation.\par Patient has been taking ibuprofen for pain with mild improvement of symptoms.

## 2023-03-10 ENCOUNTER — APPOINTMENT (OUTPATIENT)
Dept: ORTHOPEDIC SURGERY | Facility: CLINIC | Age: 78
End: 2023-03-10

## 2023-07-10 ENCOUNTER — OUTPATIENT (OUTPATIENT)
Dept: OUTPATIENT SERVICES | Facility: HOSPITAL | Age: 78
LOS: 1 days | End: 2023-07-10
Payer: MEDICARE

## 2023-07-10 DIAGNOSIS — M54.9 DORSALGIA, UNSPECIFIED: ICD-10-CM

## 2023-07-10 DIAGNOSIS — Z90.79 ACQUIRED ABSENCE OF OTHER GENITAL ORGAN(S): Chronic | ICD-10-CM

## 2023-07-10 PROCEDURE — 72110 X-RAY EXAM L-2 SPINE 4/>VWS: CPT | Mod: 26

## 2023-07-10 PROCEDURE — 72110 X-RAY EXAM L-2 SPINE 4/>VWS: CPT

## 2023-07-11 DIAGNOSIS — M54.9 DORSALGIA, UNSPECIFIED: ICD-10-CM

## 2023-08-03 ENCOUNTER — OUTPATIENT (OUTPATIENT)
Dept: OUTPATIENT SERVICES | Facility: HOSPITAL | Age: 78
LOS: 1 days | End: 2023-08-03
Payer: MEDICARE

## 2023-08-03 DIAGNOSIS — M54.59 OTHER LOW BACK PAIN: ICD-10-CM

## 2023-08-03 DIAGNOSIS — Z00.8 ENCOUNTER FOR OTHER GENERAL EXAMINATION: ICD-10-CM

## 2023-08-03 DIAGNOSIS — Z90.79 ACQUIRED ABSENCE OF OTHER GENITAL ORGAN(S): Chronic | ICD-10-CM

## 2023-08-03 PROCEDURE — A9579: CPT

## 2023-08-03 PROCEDURE — 72149 MRI LUMBAR SPINE W/DYE: CPT | Mod: 26

## 2023-08-03 PROCEDURE — 72149 MRI LUMBAR SPINE W/DYE: CPT

## 2023-08-04 DIAGNOSIS — M54.59 OTHER LOW BACK PAIN: ICD-10-CM

## 2023-08-16 ENCOUNTER — APPOINTMENT (OUTPATIENT)
Dept: PAIN MANAGEMENT | Facility: CLINIC | Age: 78
End: 2023-08-16

## 2023-08-23 ENCOUNTER — APPOINTMENT (OUTPATIENT)
Dept: PAIN MANAGEMENT | Facility: CLINIC | Age: 78
End: 2023-08-23
Payer: MEDICARE

## 2023-08-23 PROCEDURE — 99203 OFFICE O/P NEW LOW 30 MIN: CPT

## 2023-08-23 NOTE — PHYSICAL EXAM
[de-identified] : Lumbar Spine Exam: Palpation: Midline lumbar tenderness:             (-) paraspinal tenderness:                  L (-) ; R (-) SI joint tenderness:                        L (+) ; R (-) GTB tenderness:                            L (-);  R (-)   Special Tests: SLR:                           R (-) ; L (-) Facet loading:            R (-) ; L (-) LUCRECIA test:               R (-) ; L (+) Gaenslen's:               R (-) ; L (+) compression test:               R (-) ; L (+)   Gait: non- antalgic gait

## 2023-08-23 NOTE — DATA REVIEWED
[FreeTextEntry1] : MRI OF THE LUMBAR SPINE 8/3/23: moderate lumbar spondylosis. transitional lumbosacral anatomy noted.  2.5 cm calcification noted in the pelvis

## 2023-08-23 NOTE — DISCUSSION/SUMMARY
[de-identified] : A discussion regarding available pain management treatment options occurred with the patient.  These included interventional, rehabilitative, pharmacological, and alternative modalities. We will proceed with the following:  Interventional treatment options: Will proceed with left SIJ injection Treatment options were discussed with the patient. The patient has been having persistent pain in the left sacroiliac joint with minimal improvement with conservative therapies. The patient was given the option to proceed with a left sacroiliac joint injection to try to get some pain relief.  The patient understands that the injections are meant to offer pain relief but do not always give pain relief, and thus it is partially diagnostic. If this is the case, this can add to our clinical picture and we can reconsider our options at that point. The risks and benefits were discussed which included bleeding, infection, nerve injury, no pain relief or worse, increased pain. All questions were answered and the patient will schedule for the injection on the next available date.  Rehabilitative options: - participation in active HEP was discussed  Medication based treatment options: c/w Tylenol   Complementary treatment options: - lifestyle modifications discussed  follow up two weeks after injection.   I, Jina Parada, attest that this documentation has been prepared under the direction and in the presence of Provider Jose Cardoso, DO The documentation recorded by the scribe, in my presence, accurately reflects the service I personally performed, and the decisions made by me with my edits as appropriate.  Best Regards,  Jose Cardoso D.O.

## 2023-08-23 NOTE — HISTORY OF PRESENT ILLNESS
[FreeTextEntry1] : HPI: Ms. Wyatt is a 78-year-old female complaining of back and leg pain. This pain started 3 months ago upon waking up and worsened over the last month. The pain came up after no specific injury or event. The pain is low back left side that refers to left posterior buttock. The patient states his pain feels like a funny bone feeling that is sharp in nature. The pain is worse in the morning and when standing and walking. Once the patient starts going and moving the pain alleviates. The pain is moderate to severe that is nearly constant of the time. Pt denies bowel/bladder incontinence, weakness, falls, unsteadiness. The pain can reach 8/10 on the pain scale.   ACTIVITIES: Patient could walk 2 blocks before the pain starts. The patient never lies down because of pain.  Patient uses no assisted walking device at this time.

## 2023-08-30 ENCOUNTER — APPOINTMENT (OUTPATIENT)
Dept: PAIN MANAGEMENT | Facility: CLINIC | Age: 78
End: 2023-08-30
Payer: MEDICARE

## 2023-08-30 PROCEDURE — 93770 DETERMINATION VENOUS PRESS: CPT

## 2023-08-30 PROCEDURE — 94761 N-INVAS EAR/PLS OXIMETRY MLT: CPT

## 2023-08-30 PROCEDURE — 27096 INJECT SACROILIAC JOINT: CPT | Mod: LT

## 2023-08-30 NOTE — PROCEDURE
[FreeTextEntry3] : Date of Service: 08/30/2023   Account: [id number]  Patient: EVELYN MORALES   YOB: 1945  Age: 78 year Surgeon:      Jose Cardoso DO  Pre - Operative Diagnosis:       Left sacroiliitis        Post - Operative Diagnosis:     Same              Procedure:             left Sacroiliac arthrogram and joint injection under fluoroscopic guidance  This procedure was carried out using fluoroscopic guidance.  The risks and benefits of the procedure were discussed extensively with the patient.  The consent of the patient was obtained and the following procedure was performed. The patient was placed in the prone position on the fluoroscopy table and the area was prepped and draped in a sterile fashion.  A timeout was performed with all essential staff present and the site and side were verified.   The fluoroscopic image intensifier was then positioned oseas maximize visualization of the joint.  This was achieved with slight cephalad and medial angulation.   - The area corresponding to the left inferior SIJ space was then identified and marked.  A 25 gauge 3.5 inch spinal needle was then inserted and directed into the left sacroiliac joint space using fluoroscopic guidance.  After negative aspiration for heme and CSF, 2 cc of omnipaque was injected and appeared to fill the joint space.  An injectate of 2.5 cc 0.25% Marcaine plus 10 mg of Dexamethasone was then injected into the left sacroiliac joint space.  The needle was subsequently removed.  Vital signs remained normal.  Pulse oximeter was used throughout the procedure and the patient's pulse and oxygen saturation remained within normal limits.  The patient tolerated the procedure well.  There were no complications.  The patient was instructed to apply ice over the injection sites for twenty minutes every two hours for the next 24 to 48 hours.  Disposition:        1. The patient was advised to F/U in 1-2 weeks to assess the response to the injection.       2. The patient was also instructed to contact me immediately if there were any concerns related to the procedure performed.

## 2023-09-06 ENCOUNTER — APPOINTMENT (OUTPATIENT)
Dept: PAIN MANAGEMENT | Facility: CLINIC | Age: 78
End: 2023-09-06
Payer: MEDICARE

## 2023-09-06 VITALS — BODY MASS INDEX: 31.39 KG/M2 | HEIGHT: 67 IN | WEIGHT: 200 LBS

## 2023-09-06 DIAGNOSIS — M46.1 SACROILIITIS, NOT ELSEWHERE CLASSIFIED: ICD-10-CM

## 2023-09-06 PROCEDURE — 99214 OFFICE O/P EST MOD 30 MIN: CPT

## 2023-09-06 NOTE — PHYSICAL EXAM
[de-identified] : Lumbar Spine Exam: Palpation: + seated slump on the left  Midline lumbar tenderness:             (-) paraspinal tenderness:                  L (-) ; R (-) SI joint tenderness:                        L (+) ; R (-) GTB tenderness:                            L (-);  R (-)   Special Tests: SLR:                           R (-) ; L (-) Facet loading:            R (-) ; L (-) LUCRECIA test:               R (-) ; L (+) Gaenslen's:               R (-) ; L (+) compression test:               R (-) ; L (+)   Gait: non- antalgic gait

## 2023-09-06 NOTE — HISTORY OF PRESENT ILLNESS
[FreeTextEntry1] : HPI: Ms. Wyatt is a 78-year-old female complaining of back and leg pain. This pain started 3 months ago upon waking up and worsened over the last month. The pain came up after no specific injury or event. The pain is low back left side that refers to left posterior buttock. The patient states his pain feels like a funny bone feeling that is sharp in nature. The pain is worse in the morning and when standing and walking. Once the patient starts going and moving the pain alleviates. The pain is moderate to severe that is nearly constant of the time. Pt denies bowel/bladder incontinence, weakness, falls, unsteadiness. The pain can reach 8/10 on the pain scale.   ACTIVITIES: Patient could walk 2 blocks before the pain starts. The patient never lies down because of pain.  Patient uses no assisted walking device at this time.  9/6/23 Pt is s/p Left SI joint injection performed 8/30/23 Pt had 100% relief for 4 days and has 50% sustained relief in his low back and buttock. Pain continues to persist in the left low back pain that is dull, achy, sharp throbbing that radiates the left buttock worse in the morning. Sitting and bending improves the pain, standing and walking worsens the pain. Today he rates pain a 8/10 on the pain scale.

## 2023-09-06 NOTE — REASON FOR VISIT
[Follow-Up Visit] : a follow-up pain management visit [FreeTextEntry2] : S/P LT SI JOINT INJECTION UNDER FLURO

## 2023-09-06 NOTE — DISCUSSION/SUMMARY
[de-identified] : A discussion regarding available pain management treatment options occurred with the patient.  These included interventional, rehabilitative, pharmacological, and alternative modalities. We will proceed with the following:  Interventional treatment options: - Proceed with L3-4 LESI  Treatment options were discussed with the patient. The patient has been having persistent lower back and lumbar radicular pain with minimal improvement with conservative therapies. Given that the patient has severe pain and failed conservative treatment, the patient was given the option to proceed with a lumbar epidural steroid injection to try to get some pain relief.    The risks and benefits were discussed which included bleeding, infection, nerve injury, no pain relief or worse, increased pain. All questions were answered and concerns addressed.   Rehabilitative options: - participation in active HEP was discussed  Medication based treatment options: -ordered mobic 7.5mg daily for 10 days. Discussed risks and benefits. Avoid taking for any side effects -  Percocet 5-325mg 7 day supply was ordered   - pt will f/u in 2 weeks after injection  Susan MONDRAGON attest that this documentation has been prepared under the direction and in the presence of Provider Jose Cardoso, DO The documentation recorded by the scribe, in my presence, accurately reflects the service I personally performed, and the decisions made by me with my edits as appropriate.  Best Regards,  Jose Cardoso D.O.

## 2023-09-13 ENCOUNTER — RX RENEWAL (OUTPATIENT)
Age: 78
End: 2023-09-13

## 2023-09-20 ENCOUNTER — APPOINTMENT (OUTPATIENT)
Dept: PAIN MANAGEMENT | Facility: CLINIC | Age: 78
End: 2023-09-20
Payer: MEDICARE

## 2023-09-20 PROCEDURE — 94761 N-INVAS EAR/PLS OXIMETRY MLT: CPT | Mod: 59

## 2023-09-20 PROCEDURE — 62323 NJX INTERLAMINAR LMBR/SAC: CPT

## 2023-09-20 PROCEDURE — 93770 DETERMINATION VENOUS PRESS: CPT | Mod: 59

## 2023-10-04 ENCOUNTER — APPOINTMENT (OUTPATIENT)
Dept: PAIN MANAGEMENT | Facility: CLINIC | Age: 78
End: 2023-10-04
Payer: MEDICARE

## 2023-10-04 PROCEDURE — 99214 OFFICE O/P EST MOD 30 MIN: CPT

## 2023-10-11 ENCOUNTER — APPOINTMENT (OUTPATIENT)
Dept: PAIN MANAGEMENT | Facility: CLINIC | Age: 78
End: 2023-10-11

## 2023-10-15 ENCOUNTER — RX RENEWAL (OUTPATIENT)
Age: 78
End: 2023-10-15

## 2023-10-18 ENCOUNTER — APPOINTMENT (OUTPATIENT)
Dept: PAIN MANAGEMENT | Facility: CLINIC | Age: 78
End: 2023-10-18

## 2023-10-18 ENCOUNTER — APPOINTMENT (OUTPATIENT)
Dept: PAIN MANAGEMENT | Facility: CLINIC | Age: 78
End: 2023-10-18
Payer: MEDICARE

## 2023-10-18 PROCEDURE — 94761 N-INVAS EAR/PLS OXIMETRY MLT: CPT | Mod: 59

## 2023-10-18 PROCEDURE — 93770 DETERMINATION VENOUS PRESS: CPT | Mod: 59

## 2023-10-18 PROCEDURE — 62323 NJX INTERLAMINAR LMBR/SAC: CPT

## 2023-11-01 ENCOUNTER — APPOINTMENT (OUTPATIENT)
Dept: PAIN MANAGEMENT | Facility: CLINIC | Age: 78
End: 2023-11-01
Payer: MEDICARE

## 2023-11-01 VITALS — HEIGHT: 67 IN | WEIGHT: 200 LBS | BODY MASS INDEX: 31.39 KG/M2

## 2023-11-01 PROCEDURE — 99214 OFFICE O/P EST MOD 30 MIN: CPT

## 2023-11-06 ENCOUNTER — INPATIENT (INPATIENT)
Facility: HOSPITAL | Age: 78
LOS: 0 days | Discharge: HOME | DRG: 446 | End: 2023-11-07
Attending: INTERNAL MEDICINE | Admitting: INTERNAL MEDICINE
Payer: MEDICARE

## 2023-11-06 VITALS
SYSTOLIC BLOOD PRESSURE: 180 MMHG | DIASTOLIC BLOOD PRESSURE: 86 MMHG | WEIGHT: 199.96 LBS | OXYGEN SATURATION: 94 % | HEART RATE: 68 BPM | RESPIRATION RATE: 18 BRPM | HEIGHT: 67 IN | TEMPERATURE: 97 F

## 2023-11-06 DIAGNOSIS — Z90.79 ACQUIRED ABSENCE OF OTHER GENITAL ORGAN(S): Chronic | ICD-10-CM

## 2023-11-06 LAB
ALBUMIN SERPL ELPH-MCNC: 4.6 G/DL — SIGNIFICANT CHANGE UP (ref 3.5–5.2)
ALBUMIN SERPL ELPH-MCNC: 4.6 G/DL — SIGNIFICANT CHANGE UP (ref 3.5–5.2)
ALP SERPL-CCNC: 106 U/L — SIGNIFICANT CHANGE UP (ref 30–115)
ALP SERPL-CCNC: 106 U/L — SIGNIFICANT CHANGE UP (ref 30–115)
ALT FLD-CCNC: 18 U/L — SIGNIFICANT CHANGE UP (ref 0–41)
ALT FLD-CCNC: 18 U/L — SIGNIFICANT CHANGE UP (ref 0–41)
ANION GAP SERPL CALC-SCNC: 10 MMOL/L — SIGNIFICANT CHANGE UP (ref 7–14)
ANION GAP SERPL CALC-SCNC: 10 MMOL/L — SIGNIFICANT CHANGE UP (ref 7–14)
AST SERPL-CCNC: 23 U/L — SIGNIFICANT CHANGE UP (ref 0–41)
AST SERPL-CCNC: 23 U/L — SIGNIFICANT CHANGE UP (ref 0–41)
BASE EXCESS BLDV CALC-SCNC: 1.4 MMOL/L — SIGNIFICANT CHANGE UP (ref -2–3)
BASE EXCESS BLDV CALC-SCNC: 1.4 MMOL/L — SIGNIFICANT CHANGE UP (ref -2–3)
BASOPHILS # BLD AUTO: 0.06 K/UL — SIGNIFICANT CHANGE UP (ref 0–0.2)
BASOPHILS # BLD AUTO: 0.06 K/UL — SIGNIFICANT CHANGE UP (ref 0–0.2)
BASOPHILS NFR BLD AUTO: 0.6 % — SIGNIFICANT CHANGE UP (ref 0–1)
BASOPHILS NFR BLD AUTO: 0.6 % — SIGNIFICANT CHANGE UP (ref 0–1)
BILIRUB SERPL-MCNC: 0.6 MG/DL — SIGNIFICANT CHANGE UP (ref 0.2–1.2)
BILIRUB SERPL-MCNC: 0.6 MG/DL — SIGNIFICANT CHANGE UP (ref 0.2–1.2)
BUN SERPL-MCNC: 21 MG/DL — HIGH (ref 10–20)
BUN SERPL-MCNC: 21 MG/DL — HIGH (ref 10–20)
CA-I SERPL-SCNC: 1.24 MMOL/L — SIGNIFICANT CHANGE UP (ref 1.15–1.33)
CA-I SERPL-SCNC: 1.24 MMOL/L — SIGNIFICANT CHANGE UP (ref 1.15–1.33)
CALCIUM SERPL-MCNC: 9.9 MG/DL — SIGNIFICANT CHANGE UP (ref 8.4–10.4)
CALCIUM SERPL-MCNC: 9.9 MG/DL — SIGNIFICANT CHANGE UP (ref 8.4–10.4)
CHLORIDE SERPL-SCNC: 106 MMOL/L — SIGNIFICANT CHANGE UP (ref 98–110)
CHLORIDE SERPL-SCNC: 106 MMOL/L — SIGNIFICANT CHANGE UP (ref 98–110)
CO2 SERPL-SCNC: 25 MMOL/L — SIGNIFICANT CHANGE UP (ref 17–32)
CO2 SERPL-SCNC: 25 MMOL/L — SIGNIFICANT CHANGE UP (ref 17–32)
CREAT SERPL-MCNC: 1 MG/DL — SIGNIFICANT CHANGE UP (ref 0.7–1.5)
CREAT SERPL-MCNC: 1 MG/DL — SIGNIFICANT CHANGE UP (ref 0.7–1.5)
EGFR: 78 ML/MIN/1.73M2 — SIGNIFICANT CHANGE UP
EGFR: 78 ML/MIN/1.73M2 — SIGNIFICANT CHANGE UP
EOSINOPHIL # BLD AUTO: 0.26 K/UL — SIGNIFICANT CHANGE UP (ref 0–0.7)
EOSINOPHIL # BLD AUTO: 0.26 K/UL — SIGNIFICANT CHANGE UP (ref 0–0.7)
EOSINOPHIL NFR BLD AUTO: 2.4 % — SIGNIFICANT CHANGE UP (ref 0–8)
EOSINOPHIL NFR BLD AUTO: 2.4 % — SIGNIFICANT CHANGE UP (ref 0–8)
GAS PNL BLDV: 135 MMOL/L — LOW (ref 136–145)
GAS PNL BLDV: 135 MMOL/L — LOW (ref 136–145)
GAS PNL BLDV: SIGNIFICANT CHANGE UP
GAS PNL BLDV: SIGNIFICANT CHANGE UP
GLUCOSE SERPL-MCNC: 158 MG/DL — HIGH (ref 70–99)
GLUCOSE SERPL-MCNC: 158 MG/DL — HIGH (ref 70–99)
HCO3 BLDV-SCNC: 29 MMOL/L — SIGNIFICANT CHANGE UP (ref 22–29)
HCO3 BLDV-SCNC: 29 MMOL/L — SIGNIFICANT CHANGE UP (ref 22–29)
HCT VFR BLD CALC: 46.1 % — SIGNIFICANT CHANGE UP (ref 42–52)
HCT VFR BLD CALC: 46.1 % — SIGNIFICANT CHANGE UP (ref 42–52)
HCT VFR BLDA CALC: 45 % — SIGNIFICANT CHANGE UP (ref 39–51)
HCT VFR BLDA CALC: 45 % — SIGNIFICANT CHANGE UP (ref 39–51)
HGB BLD CALC-MCNC: 15.1 G/DL — SIGNIFICANT CHANGE UP (ref 12.6–17.4)
HGB BLD CALC-MCNC: 15.1 G/DL — SIGNIFICANT CHANGE UP (ref 12.6–17.4)
HGB BLD-MCNC: 15 G/DL — SIGNIFICANT CHANGE UP (ref 14–18)
HGB BLD-MCNC: 15 G/DL — SIGNIFICANT CHANGE UP (ref 14–18)
IMM GRANULOCYTES NFR BLD AUTO: 0.4 % — HIGH (ref 0.1–0.3)
IMM GRANULOCYTES NFR BLD AUTO: 0.4 % — HIGH (ref 0.1–0.3)
LACTATE BLDV-MCNC: 1.6 MMOL/L — SIGNIFICANT CHANGE UP (ref 0.5–2)
LACTATE BLDV-MCNC: 1.6 MMOL/L — SIGNIFICANT CHANGE UP (ref 0.5–2)
LACTATE SERPL-SCNC: 1.5 MMOL/L — SIGNIFICANT CHANGE UP (ref 0.7–2)
LACTATE SERPL-SCNC: 1.5 MMOL/L — SIGNIFICANT CHANGE UP (ref 0.7–2)
LIDOCAIN IGE QN: 34 U/L — SIGNIFICANT CHANGE UP (ref 7–60)
LIDOCAIN IGE QN: 34 U/L — SIGNIFICANT CHANGE UP (ref 7–60)
LYMPHOCYTES # BLD AUTO: 1.3 K/UL — SIGNIFICANT CHANGE UP (ref 1.2–3.4)
LYMPHOCYTES # BLD AUTO: 1.3 K/UL — SIGNIFICANT CHANGE UP (ref 1.2–3.4)
LYMPHOCYTES # BLD AUTO: 12 % — LOW (ref 20.5–51.1)
LYMPHOCYTES # BLD AUTO: 12 % — LOW (ref 20.5–51.1)
MAGNESIUM SERPL-MCNC: 1.8 MG/DL — SIGNIFICANT CHANGE UP (ref 1.8–2.4)
MAGNESIUM SERPL-MCNC: 1.8 MG/DL — SIGNIFICANT CHANGE UP (ref 1.8–2.4)
MCHC RBC-ENTMCNC: 28.7 PG — SIGNIFICANT CHANGE UP (ref 27–31)
MCHC RBC-ENTMCNC: 28.7 PG — SIGNIFICANT CHANGE UP (ref 27–31)
MCHC RBC-ENTMCNC: 32.5 G/DL — SIGNIFICANT CHANGE UP (ref 32–37)
MCHC RBC-ENTMCNC: 32.5 G/DL — SIGNIFICANT CHANGE UP (ref 32–37)
MCV RBC AUTO: 88.3 FL — SIGNIFICANT CHANGE UP (ref 80–94)
MCV RBC AUTO: 88.3 FL — SIGNIFICANT CHANGE UP (ref 80–94)
MONOCYTES # BLD AUTO: 0.94 K/UL — HIGH (ref 0.1–0.6)
MONOCYTES # BLD AUTO: 0.94 K/UL — HIGH (ref 0.1–0.6)
MONOCYTES NFR BLD AUTO: 8.7 % — SIGNIFICANT CHANGE UP (ref 1.7–9.3)
MONOCYTES NFR BLD AUTO: 8.7 % — SIGNIFICANT CHANGE UP (ref 1.7–9.3)
NEUTROPHILS # BLD AUTO: 8.26 K/UL — HIGH (ref 1.4–6.5)
NEUTROPHILS # BLD AUTO: 8.26 K/UL — HIGH (ref 1.4–6.5)
NEUTROPHILS NFR BLD AUTO: 75.9 % — HIGH (ref 42.2–75.2)
NEUTROPHILS NFR BLD AUTO: 75.9 % — HIGH (ref 42.2–75.2)
NRBC # BLD: 0 /100 WBCS — SIGNIFICANT CHANGE UP (ref 0–0)
NRBC # BLD: 0 /100 WBCS — SIGNIFICANT CHANGE UP (ref 0–0)
NT-PROBNP SERPL-SCNC: 272 PG/ML — SIGNIFICANT CHANGE UP (ref 0–300)
NT-PROBNP SERPL-SCNC: 272 PG/ML — SIGNIFICANT CHANGE UP (ref 0–300)
PCO2 BLDV: 58 MMHG — HIGH (ref 42–55)
PCO2 BLDV: 58 MMHG — HIGH (ref 42–55)
PH BLDV: 7.31 — LOW (ref 7.32–7.43)
PH BLDV: 7.31 — LOW (ref 7.32–7.43)
PLATELET # BLD AUTO: 177 K/UL — SIGNIFICANT CHANGE UP (ref 130–400)
PLATELET # BLD AUTO: 177 K/UL — SIGNIFICANT CHANGE UP (ref 130–400)
PMV BLD: 10.9 FL — HIGH (ref 7.4–10.4)
PMV BLD: 10.9 FL — HIGH (ref 7.4–10.4)
PO2 BLDV: 21 MMHG — SIGNIFICANT CHANGE UP
PO2 BLDV: 21 MMHG — SIGNIFICANT CHANGE UP
POTASSIUM BLDV-SCNC: 4.5 MMOL/L — SIGNIFICANT CHANGE UP (ref 3.5–5.1)
POTASSIUM BLDV-SCNC: 4.5 MMOL/L — SIGNIFICANT CHANGE UP (ref 3.5–5.1)
POTASSIUM SERPL-MCNC: 5.5 MMOL/L — HIGH (ref 3.5–5)
POTASSIUM SERPL-MCNC: 5.5 MMOL/L — HIGH (ref 3.5–5)
POTASSIUM SERPL-SCNC: 5.5 MMOL/L — HIGH (ref 3.5–5)
POTASSIUM SERPL-SCNC: 5.5 MMOL/L — HIGH (ref 3.5–5)
PROT SERPL-MCNC: 7.2 G/DL — SIGNIFICANT CHANGE UP (ref 6–8)
PROT SERPL-MCNC: 7.2 G/DL — SIGNIFICANT CHANGE UP (ref 6–8)
RBC # BLD: 5.22 M/UL — SIGNIFICANT CHANGE UP (ref 4.7–6.1)
RBC # BLD: 5.22 M/UL — SIGNIFICANT CHANGE UP (ref 4.7–6.1)
RBC # FLD: 13.2 % — SIGNIFICANT CHANGE UP (ref 11.5–14.5)
RBC # FLD: 13.2 % — SIGNIFICANT CHANGE UP (ref 11.5–14.5)
SAO2 % BLDV: 25.5 % — SIGNIFICANT CHANGE UP
SAO2 % BLDV: 25.5 % — SIGNIFICANT CHANGE UP
SODIUM SERPL-SCNC: 141 MMOL/L — SIGNIFICANT CHANGE UP (ref 135–146)
SODIUM SERPL-SCNC: 141 MMOL/L — SIGNIFICANT CHANGE UP (ref 135–146)
TROPONIN T SERPL-MCNC: <0.01 NG/ML — SIGNIFICANT CHANGE UP
TROPONIN T SERPL-MCNC: <0.01 NG/ML — SIGNIFICANT CHANGE UP
WBC # BLD: 10.86 K/UL — HIGH (ref 4.8–10.8)
WBC # BLD: 10.86 K/UL — HIGH (ref 4.8–10.8)
WBC # FLD AUTO: 10.86 K/UL — HIGH (ref 4.8–10.8)
WBC # FLD AUTO: 10.86 K/UL — HIGH (ref 4.8–10.8)

## 2023-11-06 PROCEDURE — 71045 X-RAY EXAM CHEST 1 VIEW: CPT | Mod: 26

## 2023-11-06 PROCEDURE — 74177 CT ABD & PELVIS W/CONTRAST: CPT | Mod: 26,MA

## 2023-11-06 PROCEDURE — 93010 ELECTROCARDIOGRAM REPORT: CPT

## 2023-11-06 PROCEDURE — 99285 EMERGENCY DEPT VISIT HI MDM: CPT

## 2023-11-06 RX ORDER — ONDANSETRON 8 MG/1
4 TABLET, FILM COATED ORAL ONCE
Refills: 0 | Status: COMPLETED | OUTPATIENT
Start: 2023-11-06 | End: 2023-11-06

## 2023-11-06 RX ADMIN — ONDANSETRON 4 MILLIGRAM(S): 8 TABLET, FILM COATED ORAL at 19:32

## 2023-11-06 NOTE — ED PROVIDER NOTE - PROGRESS NOTE DETAILS
surgery consult placed SR: cleared from surgery, no antibiotics will admit to medicine for further workup of chest pain

## 2023-11-06 NOTE — ED PROVIDER NOTE - PHYSICAL EXAMINATION
CONSTITUTIONAL: WA / WN / NAD  HEAD: NCAT  EYES: PERRL; EOMI;   ENT: Normal pharynx; mucous membranes pink/moist, no erythema.  NECK: Supple; no meningeal signs  CARD: RRR; nl S1/S2; no M/R/G  RESP: Respiratory rate and effort are normal; breath sounds clear and equal bilaterally.  ABD: Soft, ND +epigastric ttp  MSK/EXT: No gross deformities; full range of motion.  SKIN: Warm and dry;   NEURO: AAOx3  PSYCH: Memory Intact, Normal Affect

## 2023-11-06 NOTE — ED PROVIDER NOTE - CLINICAL SUMMARY MEDICAL DECISION MAKING FREE TEXT BOX
78-year-old man history of MI 5 years ago status post stent and BPH hyperlipidemia cardiologist Dr. Randhawa presenting here complaining of 2 days of cough no shortness of breath today began having epigastric pain nausea vomiting states feels similar to when he previously had an MI vs reviewed labs imaging obtained and reviewed, imaging concern for gallstones/cholecystitis surgery consulted do not believe it to be cholecystitis no antibiotics, patient admitted to medicine for further workup of chest pain.

## 2023-11-06 NOTE — ED PROVIDER NOTE - OBJECTIVE STATEMENT
78-year-old man history of MI 5 years ago status post stent and BPH hyperlipidemia cardiologist Dr. Randhawa presenting here complaining of 2 days of cough no shortness of breath today began having epigastric pain nausea vomiting states feels similar to when he previously had an MI no shortness of breath

## 2023-11-07 ENCOUNTER — TRANSCRIPTION ENCOUNTER (OUTPATIENT)
Age: 78
End: 2023-11-07

## 2023-11-07 VITALS
TEMPERATURE: 98 F | DIASTOLIC BLOOD PRESSURE: 60 MMHG | SYSTOLIC BLOOD PRESSURE: 141 MMHG | RESPIRATION RATE: 20 BRPM | HEART RATE: 62 BPM | OXYGEN SATURATION: 97 %

## 2023-11-07 DIAGNOSIS — R07.9 CHEST PAIN, UNSPECIFIED: ICD-10-CM

## 2023-11-07 LAB
ANION GAP SERPL CALC-SCNC: 11 MMOL/L — SIGNIFICANT CHANGE UP (ref 7–14)
ANION GAP SERPL CALC-SCNC: 11 MMOL/L — SIGNIFICANT CHANGE UP (ref 7–14)
APPEARANCE UR: CLEAR — SIGNIFICANT CHANGE UP
APPEARANCE UR: CLEAR — SIGNIFICANT CHANGE UP
BILIRUB UR-MCNC: NEGATIVE — SIGNIFICANT CHANGE UP
BILIRUB UR-MCNC: NEGATIVE — SIGNIFICANT CHANGE UP
BUN SERPL-MCNC: 17 MG/DL — SIGNIFICANT CHANGE UP (ref 10–20)
BUN SERPL-MCNC: 17 MG/DL — SIGNIFICANT CHANGE UP (ref 10–20)
CALCIUM SERPL-MCNC: 9.3 MG/DL — SIGNIFICANT CHANGE UP (ref 8.4–10.5)
CALCIUM SERPL-MCNC: 9.3 MG/DL — SIGNIFICANT CHANGE UP (ref 8.4–10.5)
CHLORIDE SERPL-SCNC: 104 MMOL/L — SIGNIFICANT CHANGE UP (ref 98–110)
CHLORIDE SERPL-SCNC: 104 MMOL/L — SIGNIFICANT CHANGE UP (ref 98–110)
CO2 SERPL-SCNC: 25 MMOL/L — SIGNIFICANT CHANGE UP (ref 17–32)
CO2 SERPL-SCNC: 25 MMOL/L — SIGNIFICANT CHANGE UP (ref 17–32)
COLOR SPEC: YELLOW — SIGNIFICANT CHANGE UP
COLOR SPEC: YELLOW — SIGNIFICANT CHANGE UP
CREAT SERPL-MCNC: 1.1 MG/DL — SIGNIFICANT CHANGE UP (ref 0.7–1.5)
CREAT SERPL-MCNC: 1.1 MG/DL — SIGNIFICANT CHANGE UP (ref 0.7–1.5)
DIFF PNL FLD: NEGATIVE — SIGNIFICANT CHANGE UP
DIFF PNL FLD: NEGATIVE — SIGNIFICANT CHANGE UP
EGFR: 69 ML/MIN/1.73M2 — SIGNIFICANT CHANGE UP
EGFR: 69 ML/MIN/1.73M2 — SIGNIFICANT CHANGE UP
GLUCOSE SERPL-MCNC: 108 MG/DL — HIGH (ref 70–99)
GLUCOSE SERPL-MCNC: 108 MG/DL — HIGH (ref 70–99)
GLUCOSE UR QL: NEGATIVE MG/DL — SIGNIFICANT CHANGE UP
GLUCOSE UR QL: NEGATIVE MG/DL — SIGNIFICANT CHANGE UP
KETONES UR-MCNC: NEGATIVE MG/DL — SIGNIFICANT CHANGE UP
KETONES UR-MCNC: NEGATIVE MG/DL — SIGNIFICANT CHANGE UP
LEUKOCYTE ESTERASE UR-ACNC: NEGATIVE — SIGNIFICANT CHANGE UP
LEUKOCYTE ESTERASE UR-ACNC: NEGATIVE — SIGNIFICANT CHANGE UP
NITRITE UR-MCNC: NEGATIVE — SIGNIFICANT CHANGE UP
NITRITE UR-MCNC: NEGATIVE — SIGNIFICANT CHANGE UP
PH UR: 6 — SIGNIFICANT CHANGE UP (ref 5–8)
PH UR: 6 — SIGNIFICANT CHANGE UP (ref 5–8)
POTASSIUM SERPL-MCNC: 4.2 MMOL/L — SIGNIFICANT CHANGE UP (ref 3.5–5)
POTASSIUM SERPL-MCNC: 4.2 MMOL/L — SIGNIFICANT CHANGE UP (ref 3.5–5)
POTASSIUM SERPL-SCNC: 4.2 MMOL/L — SIGNIFICANT CHANGE UP (ref 3.5–5)
POTASSIUM SERPL-SCNC: 4.2 MMOL/L — SIGNIFICANT CHANGE UP (ref 3.5–5)
PROT UR-MCNC: SIGNIFICANT CHANGE UP MG/DL
PROT UR-MCNC: SIGNIFICANT CHANGE UP MG/DL
SODIUM SERPL-SCNC: 140 MMOL/L — SIGNIFICANT CHANGE UP (ref 135–146)
SODIUM SERPL-SCNC: 140 MMOL/L — SIGNIFICANT CHANGE UP (ref 135–146)
SP GR SPEC: >1.03 — HIGH (ref 1–1.03)
SP GR SPEC: >1.03 — HIGH (ref 1–1.03)
TROPONIN T SERPL-MCNC: <0.01 NG/ML — SIGNIFICANT CHANGE UP
UROBILINOGEN FLD QL: 1 MG/DL — SIGNIFICANT CHANGE UP (ref 0.2–1)
UROBILINOGEN FLD QL: 1 MG/DL — SIGNIFICANT CHANGE UP (ref 0.2–1)

## 2023-11-07 PROCEDURE — 81003 URINALYSIS AUTO W/O SCOPE: CPT

## 2023-11-07 PROCEDURE — 80048 BASIC METABOLIC PNL TOTAL CA: CPT

## 2023-11-07 PROCEDURE — 99283 EMERGENCY DEPT VISIT LOW MDM: CPT

## 2023-11-07 PROCEDURE — 87086 URINE CULTURE/COLONY COUNT: CPT

## 2023-11-07 PROCEDURE — 84484 ASSAY OF TROPONIN QUANT: CPT

## 2023-11-07 PROCEDURE — 99223 1ST HOSP IP/OBS HIGH 75: CPT

## 2023-11-07 PROCEDURE — 76705 ECHO EXAM OF ABDOMEN: CPT | Mod: 26

## 2023-11-07 PROCEDURE — 36415 COLL VENOUS BLD VENIPUNCTURE: CPT

## 2023-11-07 RX ORDER — ACETAMINOPHEN 500 MG
650 TABLET ORAL EVERY 6 HOURS
Refills: 0 | Status: DISCONTINUED | OUTPATIENT
Start: 2023-11-07 | End: 2023-11-07

## 2023-11-07 RX ORDER — ATORVASTATIN CALCIUM 80 MG/1
80 TABLET, FILM COATED ORAL AT BEDTIME
Refills: 0 | Status: DISCONTINUED | OUTPATIENT
Start: 2023-11-07 | End: 2023-11-07

## 2023-11-07 RX ORDER — ENOXAPARIN SODIUM 100 MG/ML
40 INJECTION SUBCUTANEOUS EVERY 24 HOURS
Refills: 0 | Status: DISCONTINUED | OUTPATIENT
Start: 2023-11-07 | End: 2023-11-07

## 2023-11-07 RX ORDER — METOPROLOL TARTRATE 50 MG
100 TABLET ORAL DAILY
Refills: 0 | Status: DISCONTINUED | OUTPATIENT
Start: 2023-11-07 | End: 2023-11-07

## 2023-11-07 RX ORDER — PANTOPRAZOLE SODIUM 20 MG/1
40 TABLET, DELAYED RELEASE ORAL
Refills: 0 | Status: DISCONTINUED | OUTPATIENT
Start: 2023-11-07 | End: 2023-11-07

## 2023-11-07 RX ORDER — LANOLIN ALCOHOL/MO/W.PET/CERES
5 CREAM (GRAM) TOPICAL AT BEDTIME
Refills: 0 | Status: DISCONTINUED | OUTPATIENT
Start: 2023-11-07 | End: 2023-11-07

## 2023-11-07 RX ORDER — ASPIRIN/CALCIUM CARB/MAGNESIUM 324 MG
81 TABLET ORAL DAILY
Refills: 0 | Status: DISCONTINUED | OUTPATIENT
Start: 2023-11-07 | End: 2023-11-07

## 2023-11-07 RX ORDER — SODIUM ZIRCONIUM CYCLOSILICATE 10 G/10G
10 POWDER, FOR SUSPENSION ORAL ONCE
Refills: 0 | Status: COMPLETED | OUTPATIENT
Start: 2023-11-07 | End: 2023-11-07

## 2023-11-07 RX ADMIN — SODIUM ZIRCONIUM CYCLOSILICATE 10 GRAM(S): 10 POWDER, FOR SUSPENSION ORAL at 10:16

## 2023-11-07 RX ADMIN — ENOXAPARIN SODIUM 40 MILLIGRAM(S): 100 INJECTION SUBCUTANEOUS at 10:19

## 2023-11-07 RX ADMIN — Medication 100 MILLIGRAM(S): at 10:18

## 2023-11-07 RX ADMIN — Medication 81 MILLIGRAM(S): at 10:17

## 2023-11-07 NOTE — H&P ADULT - HISTORY OF PRESENT ILLNESS
78M with PMHx of HTN, HLD, MI s/p stents x2, Hep C, prostate CA, cholelithiasis presented to the ED for evaluation of epigastric abdominal pain with nausea x 2 days. Pt reports that 2 days ago he started feeling "unwell" and had intermittent diffuse abdominal pain associated with nausea but no vomiting. Pt described pain as dull, intermittent, non-radiating, unclear relation to food or defecation, but it was associated with watery diarrhea that has improved. He also stated that it felt similar to his previous cardiac episodes so he came here for evaluation. Pt denied fever, chills, vomiting, constipation, CP, SOB, palpitations, or any  symptoms. He states that he had a recent viral URI but his symptoms are improving.     In the ED: /86, HR 68, T 97F, RR 18 satting 94%. Labs notable for WBC 10.8, K 5.5, LFTs all wnl. Trop -ve x2, . EKG NSR no ischemic changes. VBG pH 7.31, pCO2 58, lactate 1.60. UA -ve.   CT AP: Cholelithiasis, with distended gallbladder, with apparent increase wall enhancement; correlation for cholecystitis suggested.  RUQ US: Gallbladder distended with cholelithiasis. No abnormal gallbladder wall   thickening. Negative reported sonographic Felder's sign. Mild dilation of proximal common bile duct up to 0.9 cm; correlation with LFTs suggested.   78M with PMHx of HTN, HLD, MI s/p stents x2, Hep C, prostate CA, cholelithiasis presented to the ED for evaluation of epigastric abdominal pain with nausea x 2 days. Pt reports that 2 days ago he started feeling "unwell" and had intermittent diffuse abdominal pain associated with nausea but no vomiting. Pt described pain as dull, intermittent, non-radiating, unclear relation to food or defecation, but it was associated with watery diarrhea that has improved. He also stated that it felt similar to his previous cardiac episodes so he came here for evaluation. Pt denied fever, chills, vomiting, constipation, CP, SOB, palpitations, or any  symptoms. He states that he had a recent viral URI but his symptoms are improving.     In the ED: /86, HR 68, T 97F, RR 18 satting 94%. Labs notable for WBC 10.8, K 5.5, LFTs all wnl. Trop -ve x2, . EKG NSR no ischemic changes. VBG pH 7.31, pCO2 58, lactate 1.60. UA -ve.   CT AP: Cholelithiasis, with distended gallbladder, with apparent increase wall enhancement; correlation for cholecystitis suggested.  RUQ US: Gallbladder distended with cholelithiasis. No abnormal gallbladder wall   thickening. Negative reported sonographic Felder's sign. Mild dilation of proximal common bile duct up to 0.9 cm; correlation with LFTs suggested.    Seen by surgery in the ED -> Refused cholecystectomy.

## 2023-11-07 NOTE — DISCHARGE NOTE PROVIDER - NSDCMRMEDTOKEN_GEN_ALL_CORE_FT
aspirin 81 mg oral delayed release tablet: 1 tab(s) orally once a day  atorvastatin 80 mg oral tablet: 1 tab(s) orally once a day (at bedtime)  metoprolol succinate 100 mg oral tablet, extended release: 1 tab(s) orally once a day  pantoprazole 40 mg oral delayed release tablet: 1 tab(s) orally once a day (before a meal)

## 2023-11-07 NOTE — CONSULT NOTE ADULT - SUBJECTIVE AND OBJECTIVE BOX
GENERAL SURGERY CONSULT NOTE    Patient: EVELYN MORALES , 78y ([birth year])Male   MRN: [id number]  Location: [facility] ED  Visit: 11-06-23 Emergency  Date: 11-07-23 @ 03:35    HPI:  79 y/o M with pmhx of MI s/p stents x2, Hep C, prostate CA presented to the ED c/o epigastric abdominal pain with associated nausea x 1 day.  He denies previous similar episodes of pain. Denies fever, chills, diarrhea. He was previously diagnosed with gallstones but never followed up with a surgeon.     PAST MEDICAL & SURGICAL HISTORY:  Prostate cancer  MI (myocardial infarction)  Stented coronary artery  H/O prostatectomy      Home Medications:      VITALS:  T(F): 98.7 (11-07-23 @ 00:24), Max: 98.7 (11-07-23 @ 00:24)  HR: 67 (11-07-23 @ 00:24) (67 - 68)  BP: 136/84 (11-07-23 @ 00:24) (136/84 - 180/86)  RR: 18 (11-07-23 @ 00:24) (18 - 18)  SpO2: 95% (11-07-23 @ 00:24) (94% - 95%)    PHYSICAL EXAM:  General: NAD, AAOx3, calm and cooperative  HEENT: NCAT, LAILA, EOMI, Trachea ML, Neck supple  Cardiac: RRR S1, S2, no Murmurs, rubs or gallops  Respiratory: CTAB, normal respiratory effort, breath sounds equal BL  Abdomen: Soft, non-distended, mild epigastric tenderness,   Vascular: Pulses 2+ throughout, extremities well perfused  Skin: Warm/dry, normal color, no jaundice      MEDICATIONS  (STANDING):    MEDICATIONS  (PRN):      LAB/STUDIES:                        15.0   10.86 )-----------( 177      ( 06 Nov 2023 19:43 )             46.1     11-06    141  |  106  |  21<H>  ----------------------------<  158<H>  5.5<H>   |  25  |  1.0    Ca    9.9      06 Nov 2023 19:43  Mg     1.8     11-06    TPro  7.2  /  Alb  4.6  /  TBili  0.6  /  DBili  x   /  AST  23  /  ALT  18  /  AlkPhos  106  11-06      LIVER FUNCTIONS - ( 06 Nov 2023 19:43 )  Alb: 4.6 g/dL / Pro: 7.2 g/dL / ALK PHOS: 106 U/L / ALT: 18 U/L / AST: 23 U/L / GGT: x           Urinalysis Basic - ( 06 Nov 2023 19:43 )    Color: x / Appearance: x / SG: x / pH: x  Gluc: 158 mg/dL / Ketone: x  / Bili: x / Urobili: x   Blood: x / Protein: x / Nitrite: x   Leuk Esterase: x / RBC: x / WBC x   Sq Epi: x / Non Sq Epi: x / Bacteria: x      CARDIAC MARKERS ( 06 Nov 2023 19:43 )  x     / <0.01 ng/mL / x     / x     / x            IMAGING:  < from: US Abdomen Upper Quadrant Right (11.07.23 @ 00:46) >  Gallbladder distended with cholelithiasis. No abnormal gallbladder wall   thickening. Negative reported sonographic Felder's sign.  Mild dilation of proximal common bile duct up to 0.9 cm; correlation with   LFTs suggested.    < end of copied text >  < from: CT Abdomen and Pelvis w/ IV Cont (11.06.23 @ 22:49) >  Cholelithiasis, with distended gallbladder, with apparent increase wall   enhancement; correlation for cholecystitis suggested.          ASSESSMENT:  78yM who presented with epigastric abdominal pain. Found to have cholelithiasis with distended gallbladder. Physical exam findings, imaging, and labs as documented above.     PLAN:  - Patient's pain has improved since presentation. WBC and LFTs within normal limits.   - Patient should follow up as an outpatient for an elective cholecystectomy  - He should see his cardiologist (has appointment on Wednesday with Dr Randhawa) and mention what is going on with his gallbladder to make sure he is optimized for surgery  - No need for abx at this time      Above plan discussed with Attending Surgeon Dr. Hancock , patient, patient family, and Primary team  11-07-23 @ 03:36

## 2023-11-07 NOTE — DISCHARGE NOTE NURSING/CASE MANAGEMENT/SOCIAL WORK - PATIENT PORTAL LINK FT
You can access the FollowMyHealth Patient Portal offered by [facility] by registering at the following website: [email or web address]. By joining [facility]’s FollowMyHealth portal, you will also be able to view your health information using other applications (apps) compatible with our system.

## 2023-11-07 NOTE — H&P ADULT - NSHPPOAPULMEMBOLUS_GEN_A_CORE
Dell Wiley for follow-up. His mother agreed to the [address] Program you suggested. Thanks!
no

## 2023-11-07 NOTE — H&P ADULT - ATTENDING COMMENTS
78M with PMHx of HTN, HLD, MI s/p stents x2, Hep C, prostate CA, cholelithiasis presented to the ED for evaluation of epigastric abdominal pain with nausea x 2 days. Pt reports that 2 days ago he started feeling "unwell" and had intermittent diffuse abdominal pain associated with nausea but no vomiting. Pt described pain as dull, intermittent, non-radiating, unclear relation to food or defecation, but it was associated with watery diarrhea that has improved. He also stated that it felt similar to his previous cardiac episodes so he came here for evaluation.     Epigastric pain - likely related to biliary colic  CAD s/p stents x2  HTN/HLD  - Pt reports 2 days of diffuse abdominal and epigastric pain, unrelated to meals, states similar to his previous ACS episode  - EKG NSR, nonischemic, Troponin negative x2  -   - Pt reports resolution of pain  - Trend Trop x1 and if -ve can be discharged  - Follows with cardio Dr. Wagner Randhawa, has upcoming appointment tomorrow  - c/w metoprolol, atorvastatin, ASA    CBD dilation  Cholelithiasis  Hx of Hep C, treated  - Pt has previous episodes of cholelithiasis, previously refused cholecystectomy   - CT AP: Cholelithiasis, with distended gallbladder, with apparent increase wall enhancement; correlation for cholecystitis suggested.  - RUQ US: Gallbladder distended with cholelithiasis. No abnormal gallbladder wall   thickening. Negative reported sonographic Felder's sign. Mild dilation of proximal common bile duct up to 0.9 cm; correlation with LFTs suggested.  - LFTs all normal, pt's pain improved, seen by surgery in the ED, no concern for acute cholecystitis patient to follow up as OP for cholecystectomy   - No leukocytosis, fever, hypotension -> no need for abx  - Pt lives next to hospital, can be DCed today and instructed to return if worsening symptoms/pain     Patient can be discharged home today if 11am Troponin remains negative      Patient seen at bedside, total time spent to evaluate and treat the patient's acute illness and chronic medical conditions as well as time spent reviewing labs, radiology, discussing medical plan with covering medical team was more than 71 minutes with >50% of time spent face to face with patient, discussing with patient/family as well as coordination of care

## 2023-11-07 NOTE — H&P ADULT - ASSESSMENT
78M with PMHx of HTN, HLD, MI s/p stents x2, Hep C, prostate CA, cholelithiasis presented to the ED for evaluation of epigastric abdominal pain with nausea x 2 days. Pt reports that 2 days ago he started feeling "unwell" and had intermittent diffuse abdominal pain associated with nausea but no vomiting. Pt described pain as dull, intermittent, non-radiating, unclear relation to food or defecation, but it was associated with watery diarrhea that has improved. He also stated that it felt similar to his previous cardiac episodes so he came here for evaluation.     #Epigastric pain - r/o ACS  #HTN/HLD  - Pt reports 2 days of diffuse abdominal and epigastric pain, unrelated to meals, states similar to his previous ACS episode  - EKG NSR, nonischemic  - Trop 0.01 x2  -   - Pt reports resolution of pain  - Trend Trop x1 and if -ve can be discharged  - Follows with cardio Dr. Randhawa, has upcoming appointment tomorrow  - c/w metoprolol, atorvastatin, ASA    #CBD dilation  #Cholelithiasis  - Pt has previous episodes of cholelithiasis, did not get cholecystectomy   - CT AP: Cholelithiasis, with distended gallbladder, with apparent increase wall enhancement; correlation for cholecystitis suggested.  - RUQ US: Gallbladder distended with cholelithiasis. No abnormal gallbladder wall   thickening. Negative reported sonographic Felder's sign. Mild dilation of proximal common bile duct up to 0.9 cm; correlation with LFTs suggested.  - LFTs all normal, pt's pain improved  - Seen by surgery in ED -> Pt refused cholecystectomy but can follow up outpatient  - No leukocytosis, fever, hypotension -> no need for abx  - Pt lives next to hospital, can be DCed today  78M with PMHx of HTN, HLD, MI s/p stents x2, Hep C, prostate CA, cholelithiasis presented to the ED for evaluation of epigastric abdominal pain with nausea x 2 days. Pt reports that 2 days ago he started feeling "unwell" and had intermittent diffuse abdominal pain associated with nausea but no vomiting. Pt described pain as dull, intermittent, non-radiating, unclear relation to food or defecation, but it was associated with watery diarrhea that has improved. He also stated that it felt similar to his previous cardiac episodes so he came here for evaluation.     #Epigastric pain - r/o ACS  #CAD s/p stents x2  #HTN/HLD  - Pt reports 2 days of diffuse abdominal and epigastric pain, unrelated to meals, states similar to his previous ACS episode  - EKG NSR, nonischemic  - Trop 0.01 x2  -   - Pt reports resolution of pain  - Trend Trop x1 and if -ve can be discharged  - Follows with cardio Dr. Randhawa, has upcoming appointment tomorrow  - c/w metoprolol, atorvastatin, ASA    #CBD dilation  #Cholelithiasis  #Hx of Hep C, treated  - Pt has previous episodes of cholelithiasis, did not get cholecystectomy   - CT AP: Cholelithiasis, with distended gallbladder, with apparent increase wall enhancement; correlation for cholecystitis suggested.  - RUQ US: Gallbladder distended with cholelithiasis. No abnormal gallbladder wall   thickening. Negative reported sonographic Felder's sign. Mild dilation of proximal common bile duct up to 0.9 cm; correlation with LFTs suggested.  - LFTs all normal, pt's pain improved  - Seen by surgery in ED -> Pt refused cholecystectomy but can follow up outpatient  - No leukocytosis, fever, hypotension -> no need for abx  - Pt lives next to hospital, can be DCed today and instructed to return if worsening symptoms     DVT ppx: Lovenox  GI ppx: protonix  Diet: DASH/TLC  Activity: IAT  Dispo: Anticipated discharge after 11 AM trop results

## 2023-11-07 NOTE — ED ADULT NURSE NOTE - CINV DISCH MEDS REVIEWED YN
Phone call from patient.   Notified patient of US results and treatment plan.  She verbalized understanding.
Phone call to patient.  Left message requesting call back.
Yes

## 2023-11-07 NOTE — H&P ADULT - NSHPPHYSICALEXAM_GEN_ALL_CORE
General: NAD, AOx3  HEENT: Normocephalic, atraumatic  Lungs: Normal breath sounds, no wheezes/crackles  Heart: s1s2, no mrg  Abdomen: Soft, NT/ND. No rigidity/guarding  Extremities: peripheral pulses +1, no cyanosis/edema  Neuro: Grossly normal motor exam, no focal deficits  Psych: AOx3, normal affect  Skin: No rashes or bruises General: NAD, AOx3  HEENT: Normocephalic, atraumatic  Lungs: Normal breath sounds, no wheezes/crackles  Heart: s1s2, no mrg  Abdomen: Soft, NT/ND. No rigidity/guarding  Extremities: palpable dp pulse, no cyanosis/edema  Neuro: Grossly normal motor exam, no focal deficits  Psych: AOx3, normal affect  Skin: No rashes or bruises

## 2023-11-07 NOTE — ED ADULT NURSE NOTE - OBJECTIVE STATEMENT
pt presented to ED c/o abdominal pain. Pt also c.o headache, N/V however no diarrhea. Pt with similar symptoms in the past which resulted as an STEMI. denies any chest pain or sob.

## 2023-11-07 NOTE — H&P ADULT - NSHPSOCIALHISTORY_GEN_ALL_CORE
Denies tobacco, alcohol, or substance use Denies tobacco, alcohol, or substance use  fully functional at baseline

## 2023-11-07 NOTE — H&P ADULT - NSHPREVIEWOFSYSTEMS_GEN_ALL_CORE
CONSTITUTIONAL: No weakness, fevers or chills  EYES/ENT: No visual changes;  No vertigo or throat pain   NECK: No pain or stiffness  RESPIRATORY: No cough, wheezing, hemoptysis; No shortness of breath  CARDIOVASCULAR: No chest pain or palpitations  GASTROINTESTINAL: No abdominal or epigastric pain. (+) nausea, No vomiting, or hematemesis; No diarrhea or constipation. No melena or hematochezia.  GENITOURINARY: No dysuria, frequency or hematuria  NEUROLOGICAL: No numbness or weakness  SKIN: No itching, rashes

## 2023-11-07 NOTE — DISCHARGE NOTE PROVIDER - NSDCFUSCHEDAPPT_GEN_ALL_CORE_FT
Wagner Randhawa  [facility]  CARDIOLOGY [address]  Scheduled Appointment: 11/08/2023    Jose Cardoso  [facility]  ONCPAINMGT [address]  Scheduled Appointment: 12/06/2023

## 2023-11-07 NOTE — DISCHARGE NOTE PROVIDER - ATTENDING DISCHARGE PHYSICAL EXAMINATION:
PHYSICAL EXAM:  GENERAL: NAD, speaks in full sentences, no signs of respiratory distress  HEAD:  Atraumatic, Normocephalic  EYES: EOMI, PERRLA, conjunctiva and sclera clear  NECK: Supple, No JVD  CHEST/LUNG: Clear to auscultation bilaterally; No wheeze   HEART: Regular rate and rhythm; No murmurs;   ABDOMEN: Soft, Nontender, Nondistended; Bowel sounds present   EXTREMITIES:  2+ Peripheral Pulses, No cyanosis or edema  PSYCH: AAOx3  NEUROLOGY: non-focal  SKIN: No rashes or lesions

## 2023-11-07 NOTE — DISCHARGE NOTE PROVIDER - CARE PROVIDER_API CALL
Mel Hancock  Surgical Critical Care  [address] [place] [address] [address] [place] [address]  Phone: [phone]  Fax: [phone]  Follow Up Time: 1 month

## 2023-11-07 NOTE — DISCHARGE NOTE PROVIDER - NSDCCPCAREPLAN_GEN_ALL_CORE_FT
PRINCIPAL DISCHARGE DIAGNOSIS  Diagnosis: Chest pain  Assessment and Plan of Treatment: You presented to the hospital with epigastric pain. A CT scan of your abdomen was done which showed dilation in your common bile duct, up to 9mm. It also showed the presence of gallstones, so a right upper quadrant ultrasound was performed which did not show any obstructing stone. Your liver function tests were within normal limits which makes it unlikely that you have any biliary obstruction, and you might have had a stone that passed. You were seen by surgery and offered a cholecystectomy but you declined; please feel free to see surgery in the clinic to discuss whether you would like to have an elective cholecystectomy.  Due to your cardiac history, we wanted to rule out any cardiac event as well. Your EKG was unremarkable, and three sets of troponin (cardiac enzyme) were negative. Please follow up with your cardiologist Dr. Randhawa in the clinic tomorrow.   Please return to the ED if you have fever, worsening pain, nausea/vomiting, or low blood pressure.      SECONDARY DISCHARGE DIAGNOSES  Diagnosis: Gallstones  Assessment and Plan of Treatment:

## 2023-11-07 NOTE — CONSULT NOTE ADULT - ATTENDING COMMENTS
This is 77 y/o M with pmhx of MI s/p stents x2, Hep C, prostate CA presented to the ED c/o epigastric abdominal pain with associated nausea x 1 day.  He denies previous similar episodes of pain. Denies fever, chills, diarrhea. He was previously diagnosed with gallstones but never followed up with a surgeon.   At the time of my exam patient has no abdominal pain.    PE:  AAO x3  Chest: clear.  CV : rrr  Abdomen: soft    All images and labs were reviewed.    ASSESSMENT:  77 y/o male with Cholelithiasis.  Bilary colic.    PLAN:  I offered the patient to admit him and schedule him for gallbladder removal.  He does not want to stay and will think about it abd will follow with me as outpatient.  I recommended him to stay on low fat diet and to return to ED if he develops same pain.  I also offered him to follow with me as outpatient.  He understood and agreed.

## 2023-11-07 NOTE — DISCHARGE NOTE PROVIDER - HOSPITAL COURSE
78M with PMHx of HTN, HLD, MI s/p stents x2, Hep C, prostate CA, cholelithiasis presented to the ED for evaluation of epigastric abdominal pain with nausea x 2 days. Pt reports that 2 days ago he started feeling "unwell" and had intermittent diffuse abdominal pain associated with nausea but no vomiting. Pt described pain as dull, intermittent, non-radiating, unclear relation to food or defecation, but it was associated with watery diarrhea that has improved. He also stated that it felt similar to his previous cardiac episodes so he came here for evaluation. Pt denied fever, chills, vomiting, constipation, CP, SOB, palpitations, or any  symptoms. He states that he had a recent viral URI but his symptoms are improving.     In the ED: /86, HR 68, T 97F, RR 18 satting 94%. Labs notable for WBC 10.8, K 5.5, LFTs all wnl. Trop -ve x2, . EKG NSR no ischemic changes. VBG pH 7.31, pCO2 58, lactate 1.60. UA -ve.   CT AP: Cholelithiasis, with distended gallbladder, with apparent increase wall enhancement; correlation for cholecystitis suggested.  RUQ US: Gallbladder distended with cholelithiasis. No abnormal gallbladder wall   thickening. Negative reported sonographic Felder's sign. Mild dilation of proximal common bile duct up to 0.9 cm; correlation with LFTs suggested.    Seen by surgery in the ED -> Refused cholecystectomy.     #Epigastric pain - likely biliary colic - r/o ACS  #CAD s/p stents x2  #HTN/HLD  - Pt reports 2 days of diffuse abdominal and epigastric pain, unrelated to meals, states similar to his previous ACS episode  - EKG NSR, nonischemic  - Trop 0.01 x2  -   - Pt reports resolution of pain  - Trend Trop x1 and if -ve can be discharged  - Follows with cardio Dr. Randhawa, has upcoming appointment tomorrow  - c/w metoprolol, atorvastatin, ASA    #CBD dilation  #Cholelithiasis  #Hx of Hep C, treated  - Pt has previous episodes of cholelithiasis, did not get cholecystectomy   - CT AP: Cholelithiasis, with distended gallbladder, with apparent increase wall enhancement; correlation for cholecystitis suggested.  - RUQ US: Gallbladder distended with cholelithiasis. No abnormal gallbladder wall   thickening. Negative reported sonographic Felder's sign. Mild dilation of proximal common bile duct up to 0.9 cm; correlation with LFTs suggested.  - LFTs all normal, pt's pain improved  - Seen by surgery in ED -> Pt refused cholecystectomy but can follow up outpatient  - No leukocytosis, fever, hypotension -> no need for abx  - Pt lives next to hospital, can be DCed today and instructed to return if worsening symptoms 78M with PMHx of HTN, HLD, MI s/p stents x2, Hep C, prostate CA, cholelithiasis presented to the ED for evaluation of epigastric abdominal pain with nausea x 2 days. Pt reports that 2 days ago he started feeling "unwell" and had intermittent diffuse abdominal pain associated with nausea but no vomiting. Pt described pain as dull, intermittent, non-radiating, unclear relation to food or defecation, but it was associated with watery diarrhea that has improved. He also stated that it felt similar to his previous cardiac episodes so he came here for evaluation. Pt denied fever, chills, vomiting, constipation, CP, SOB, palpitations, or any  symptoms. He states that he had a recent viral URI but his symptoms are improving.     In the ED: /86, HR 68, T 97F, RR 18 satting 94%. Labs notable for WBC 10.8, K 5.5, LFTs all wnl. Trop -ve x2, . EKG NSR no ischemic changes. VBG pH 7.31, pCO2 58, lactate 1.60. UA -ve.   CT AP: Cholelithiasis, with distended gallbladder, with apparent increase wall enhancement; correlation for cholecystitis suggested.  RUQ US: Gallbladder distended with cholelithiasis. No abnormal gallbladder wall   thickening. Negative reported sonographic Felder's sign. Mild dilation of proximal common bile duct up to 0.9 cm; correlation with LFTs suggested.    Seen by surgery in the ED -> Refused cholecystectomy, patient will follow up with surgery as outpatient  Troponins were negative x3, EKG nonischemic and patient has an appt with Dr Wagner Randhawa tomorrow  he is medically optimized for discharge home today, he is aware and in agreement with the plan. patient intructed that if the pain recurrs or has worsening symptoms

## 2023-11-08 ENCOUNTER — APPOINTMENT (OUTPATIENT)
Dept: CARDIOLOGY | Facility: CLINIC | Age: 78
End: 2023-11-08
Payer: MEDICARE

## 2023-11-08 VITALS
HEART RATE: 51 BPM | SYSTOLIC BLOOD PRESSURE: 110 MMHG | WEIGHT: 205 LBS | DIASTOLIC BLOOD PRESSURE: 70 MMHG | BODY MASS INDEX: 32.18 KG/M2 | HEIGHT: 67 IN

## 2023-11-08 DIAGNOSIS — I25.10 ATHEROSCLEROTIC HEART DISEASE OF NATIVE CORONARY ARTERY W/OUT ANGINA PECTORIS: ICD-10-CM

## 2023-11-08 DIAGNOSIS — Z95.5 PRESENCE OF CORONARY ANGIOPLASTY IMPLANT AND GRAFT: ICD-10-CM

## 2023-11-08 LAB
CULTURE RESULTS: SIGNIFICANT CHANGE UP
CULTURE RESULTS: SIGNIFICANT CHANGE UP
SPECIMEN SOURCE: SIGNIFICANT CHANGE UP
SPECIMEN SOURCE: SIGNIFICANT CHANGE UP

## 2023-11-08 PROCEDURE — 99214 OFFICE O/P EST MOD 30 MIN: CPT | Mod: 25

## 2023-11-08 PROCEDURE — 93000 ELECTROCARDIOGRAM COMPLETE: CPT

## 2023-11-09 DIAGNOSIS — R07.9 CHEST PAIN, UNSPECIFIED: ICD-10-CM

## 2023-11-09 DIAGNOSIS — I25.2 OLD MYOCARDIAL INFARCTION: ICD-10-CM

## 2023-11-09 DIAGNOSIS — I10 ESSENTIAL (PRIMARY) HYPERTENSION: ICD-10-CM

## 2023-11-09 DIAGNOSIS — K80.70 CALCULUS OF GALLBLADDER AND BILE DUCT WITHOUT CHOLECYSTITIS WITHOUT OBSTRUCTION: ICD-10-CM

## 2023-11-09 DIAGNOSIS — I25.10 ATHEROSCLEROTIC HEART DISEASE OF NATIVE CORONARY ARTERY WITHOUT ANGINA PECTORIS: ICD-10-CM

## 2023-11-09 DIAGNOSIS — E78.5 HYPERLIPIDEMIA, UNSPECIFIED: ICD-10-CM

## 2023-11-09 DIAGNOSIS — Z85.46 PERSONAL HISTORY OF MALIGNANT NEOPLASM OF PROSTATE: ICD-10-CM

## 2023-11-09 DIAGNOSIS — Z95.5 PRESENCE OF CORONARY ANGIOPLASTY IMPLANT AND GRAFT: ICD-10-CM

## 2023-11-09 DIAGNOSIS — Z86.19 PERSONAL HISTORY OF OTHER INFECTIOUS AND PARASITIC DISEASES: ICD-10-CM

## 2023-11-09 DIAGNOSIS — K83.8 OTHER SPECIFIED DISEASES OF BILIARY TRACT: ICD-10-CM

## 2023-11-09 DIAGNOSIS — Z53.29 PROCEDURE AND TREATMENT NOT CARRIED OUT BECAUSE OF PATIENT'S DECISION FOR OTHER REASONS: ICD-10-CM

## 2023-11-09 DIAGNOSIS — R10.13 EPIGASTRIC PAIN: ICD-10-CM

## 2023-11-09 DIAGNOSIS — Z79.82 LONG TERM (CURRENT) USE OF ASPIRIN: ICD-10-CM

## 2023-11-09 DIAGNOSIS — N40.0 BENIGN PROSTATIC HYPERPLASIA WITHOUT LOWER URINARY TRACT SYMPTOMS: ICD-10-CM

## 2023-11-13 ENCOUNTER — RX RENEWAL (OUTPATIENT)
Age: 78
End: 2023-11-13

## 2023-11-13 RX ORDER — MELOXICAM 7.5 MG/1
7.5 TABLET ORAL
Qty: 14 | Refills: 0 | Status: ACTIVE | COMMUNITY
Start: 2023-09-06 | End: 1900-01-01

## 2023-12-06 ENCOUNTER — APPOINTMENT (OUTPATIENT)
Dept: PAIN MANAGEMENT | Facility: CLINIC | Age: 78
End: 2023-12-06

## 2024-01-04 ENCOUNTER — APPOINTMENT (OUTPATIENT)
Dept: PAIN MANAGEMENT | Facility: CLINIC | Age: 79
End: 2024-01-04
Payer: MEDICARE

## 2024-01-04 PROCEDURE — 99214 OFFICE O/P EST MOD 30 MIN: CPT

## 2024-01-04 RX ORDER — MELOXICAM 15 MG/1
15 TABLET ORAL
Qty: 30 | Refills: 0 | Status: ACTIVE | COMMUNITY
Start: 2024-01-04 | End: 1900-01-01

## 2024-01-04 NOTE — PHYSICAL EXAM
[de-identified] : Lumbar Spine Exam: Palpation: + seated slump on the left   Gait: non- antalgic gait

## 2024-01-04 NOTE — DISCUSSION/SUMMARY
[de-identified] : A discussion regarding available pain management treatment options occurred with the patient.  These included interventional, rehabilitative, pharmacological, and alternative modalities. We will proceed with the following:  Interventional treatment options:  -Patient will proceed with a L4-5 LESI.  Treatment options were discussed with the patient. The patient has been having persistent lower back and lumbar radicular pain with minimal improvement with conservative therapies. Given that the patient has severe pain and failed conservative treatment, the patient was given the option to proceed with a lumbar epidural steroid injection to try to get some pain relief.      The risks and benefits were discussed which included bleeding, infection, nerve injury, no pain relief or worse, increased pain. All questions were answered, and concerns addressed.  Rehabilitative options: -Initiate chiropractic therapy for L3-4-disc herniation--please do traction - participation in active HEP was discussed  Medication based treatment options: -c/w Meloxicam 15mg daily for 4 weeks. Discussed risks and benefits. Avoid taking for any side effects. -Patient is aware to take for 2 weeks straight than take 1 week off before repeating.  -f/u in 5 weeks   I, Frankie Gil, attest that this documentation has been prepared under the direction and in the presence of Provider Jose Cardoso, DO The documentation recorded by the scribe, in my presence, accurately reflects the service I personally performed, and the decisions made by me with my edits as appropriate.   Best Regards, Jose Cardoso D.O.

## 2024-01-04 NOTE — HISTORY OF PRESENT ILLNESS
[FreeTextEntry1] : 11/1/23 Patient is s/p L3-4 LESI performed on 10/18/23 and states he had 50% relief to date. Patient states that he continues to feel his low back pain at its worst in the morning but slowly gets better throughout the day. Pain is dull/achy/ sharp in nature. Patient is still feeling pain after walking 1-2 blocks. Pain is less frequent since his injection rating his pain a 4/10 on the pain scale. Pt denies bowel/bladder incontinence, weakness, falls, unsteadiness.  TODAY REVISIT ENCOUNTER: 01/04/24.  Patient presents to the office today for a follow up visit with continued lower back pain at its worst in the morning but slowly gets better throughout the day.  Pain is dull/achy/ sharp in nature with radicular symptoms into his posterior left thigh. He notes he has been taking the meloxicam which has provided him with moderate relief. He states he would like to initiate chiropractic therapy. We discussed alternative treatment options, as he has undergone injection therapy in the past which has provided him with some relief. He would like to proceed with injection therapy.   Patient denies any bowel or bladder dysfunction, incontinence, or saddle anesthesia.

## 2024-01-24 ENCOUNTER — APPOINTMENT (OUTPATIENT)
Dept: PAIN MANAGEMENT | Facility: CLINIC | Age: 79
End: 2024-01-24
Payer: MEDICARE

## 2024-01-24 PROCEDURE — 62323 NJX INTERLAMINAR LMBR/SAC: CPT

## 2024-01-24 NOTE — PROCEDURE
[FreeTextEntry3] : Date of Service: 01/24/2024   Account: [id number]  Patient: EVELYN MORALES   YOB: 1945  Age: 78 year  Surgeon:      Jose Cardoso DO  Assistant:    None  Pre-Operative Diagnosis:         Lumbosacral Radiculopathy (M54.16)  Post Operative Diagnosis:       Lumbosacral Radiculopathy (M54.16)     Procedure:             Lumbar interlaminar (L5-S1) epidural steroid injection under fluoroscopic guidance Patient was scheduled for L4-5 LESI but was unable to access the l4-5 epidural space due to previous possible fracture blocking the interlaminar space so we changed the level to l5-s1.   Anesthesia:            none  This procedure was carried out using fluoroscopic guidance.  The risks and benefits of the procedure were discussed extensively with the patient.  The consent of the patient was obtained and the following procedure was performed. The patient was placed in the prone position on the fluoroscopy table and the area was prepped and draped in a sterile fashion.  A timeout was performed with all essential staff present and the site and side were verified.  The patient was placed in the prone position with a pillow under the abdomen to minimize the lumbar lordosis.  The lumbar area was prepped and draped in a sterile fashion.  Under A/P view with slight cephalad-caudad angulation, the L5-S1 interspace was identified and marked.  Using sterile technique the superficial skin was anesthetized with 1% Lidocaine.  A 20 gauge Tuohy needle was advanced into the epidural space under fluoroscopy using loss of resistance at the L5-S1 level.  After negative aspiration for heme or CSF, an epidurogram was obtained in the A/P fluoroscopic views using 2-3 cc of Omnipaque contrast confirming epidural placement of the needle.  After this, 5 cc of of a mixture of preservative free normal saline and 10mg dexamethasone were injected into the epidural space.   The needle was subsequently removed.  Vital signs remained normal.  Pulse oximeter was used throughout the procedure and the patient's pulse and oxygen saturation remained within normal limits.  The patient tolerated the procedure well.  There were no complications.  The patient was instructed to apply ice over the injection sites for twenty minutes every two hours for the next 24 to 48 hours.  Disposition:       1. The patient was advised to F/U in 1-2 weeks to assess the response to the injection.      2. The patient was also instructed to contact me immediately if there were any concerns related to the procedure performed.    Jose Cardoso,

## 2024-02-09 ENCOUNTER — APPOINTMENT (OUTPATIENT)
Dept: PAIN MANAGEMENT | Facility: CLINIC | Age: 79
End: 2024-02-09

## 2024-03-15 ENCOUNTER — APPOINTMENT (OUTPATIENT)
Dept: PAIN MANAGEMENT | Facility: CLINIC | Age: 79
End: 2024-03-15

## 2024-04-02 ENCOUNTER — APPOINTMENT (OUTPATIENT)
Dept: PAIN MANAGEMENT | Facility: CLINIC | Age: 79
End: 2024-04-02
Payer: MEDICARE

## 2024-04-02 ENCOUNTER — APPOINTMENT (OUTPATIENT)
Dept: PAIN MANAGEMENT | Facility: CLINIC | Age: 79
End: 2024-04-02

## 2024-04-02 DIAGNOSIS — M51.26 OTHER INTERVERTEBRAL DISC DISPLACEMENT, LUMBAR REGION: ICD-10-CM

## 2024-04-02 PROCEDURE — 99214 OFFICE O/P EST MOD 30 MIN: CPT

## 2024-04-02 RX ORDER — OXYCODONE AND ACETAMINOPHEN 5; 325 MG/1; MG/1
5-325 TABLET ORAL
Qty: 14 | Refills: 0 | Status: ACTIVE | COMMUNITY
Start: 2023-09-06 | End: 1900-01-01

## 2024-04-02 RX ORDER — METHYLPREDNISOLONE 4 MG/1
4 TABLET ORAL
Qty: 1 | Refills: 0 | Status: ACTIVE | COMMUNITY
Start: 2024-04-02 | End: 1900-01-01

## 2024-04-02 NOTE — DISCUSSION/SUMMARY
[de-identified] : A discussion regarding available pain management treatment options occurred with the patient.  These included interventional, rehabilitative, pharmacological, and alternative modalities. We will proceed with the following:  Interventional treatment options:  - Potential treatment options such as further conservative therapy, injections, and surgery were briefly discussed.  Medication based treatment options: - ordered Percocet 5-325mg BID for severe pain breakthrough for a duration of 1 week - ordered a Medrol Dose Pack 4mg, use as directed for a duration of 6 days. - ordered Diclofenac 75mg BID for 4 weeks. Discussed risks and benefits. Avoid taking for any side effects. - patient is aware to take for 2 weeks straight than take 1 week off before repeating.  -f/u in 4 weeks for reassessment.    I, Frankie Gil, attest that this documentation has been prepared under the direction and in the presence of Provider Jose Cardoso, DO The documentation recorded by the scribe, in my presence, accurately reflects the service I personally performed, and the decisions made by me with my edits as appropriate.   Best Regards, Jose Cardoso D.O.

## 2024-04-02 NOTE — PHYSICAL EXAM
[de-identified] : Lumbar Spine Exam: Palpation: + seated slump on the left   Gait: non- antalgic gait

## 2024-04-02 NOTE — HISTORY OF PRESENT ILLNESS
[FreeTextEntry1] : 11/1/23 Patient is s/p L3-4 LESI performed on 10/18/23 and states he had 50% relief to date. Patient states that he continues to feel his low back pain at its worst in the morning but slowly gets better throughout the day. Pain is dull/achy/ sharp in nature. Patient is still feeling pain after walking 1-2 blocks. Pain is less frequent since his injection rating his pain a 4/10 on the pain scale. Pt denies bowel/bladder incontinence, weakness, falls, unsteadiness.  TODAY REVISIT ENCOUNTER 04-: Patient presents to the office today for a follow up visit, he is status post a lumbar interlaminar (L5-S1) epidural steroid injection under fluoroscopic guidance on 01/24/24 which provided him with 80% sustained relief for 3 months. He notes the pain has returned to baseline. He presents today with continued lower back pain at its worst in the morning but slowly gets better throughout the day.  Pain is dull/achy/ sharp in nature with radicular symptoms into his left buttock and into his posterior left thigh. He notes he has been taking the meloxicam which has provided him with moderate relief. Patient denies any bowel or bladder dysfunction, incontinence, or saddle anesthesia. Pain is 7/10.

## 2024-04-29 ENCOUNTER — RX RENEWAL (OUTPATIENT)
Age: 79
End: 2024-04-29

## 2024-04-29 RX ORDER — DICLOFENAC SODIUM 75 MG/1
75 TABLET, DELAYED RELEASE ORAL
Qty: 60 | Refills: 0 | Status: ACTIVE | COMMUNITY
Start: 2024-04-02 | End: 1900-01-01

## 2024-04-30 ENCOUNTER — APPOINTMENT (OUTPATIENT)
Dept: PAIN MANAGEMENT | Facility: CLINIC | Age: 79
End: 2024-04-30
Payer: MEDICARE

## 2024-04-30 DIAGNOSIS — M51.36 OTHER INTERVERTEBRAL DISC DEGENERATION, LUMBAR REGION: ICD-10-CM

## 2024-04-30 PROCEDURE — 99214 OFFICE O/P EST MOD 30 MIN: CPT

## 2024-04-30 RX ORDER — PREGABALIN 50 MG/1
50 CAPSULE ORAL
Qty: 60 | Refills: 0 | Status: ACTIVE | COMMUNITY
Start: 2024-04-30 | End: 1900-01-01

## 2024-04-30 NOTE — DISCUSSION/SUMMARY
[de-identified] : A discussion regarding available pain management treatment options occurred with the patient.  These included interventional, rehabilitative, pharmacological, and alternative modalities. We will proceed with the following:  Interventional treatment options:  - Potential treatment options such as further conservative therapy, injections, and surgery were briefly discussed.  Medication based treatment options: - ordered Lyrica 50mg BID for a duration of 4 weeks.  - patient is aware to take for 2 weeks straight than take 1 week off before repeating.  -f/u in 4 weeks for reassessment.    I, Frankie Gil, attest that this documentation has been prepared under the direction and in the presence of Provider Jose Cardoso, DO The documentation recorded by the scribe, in my presence, accurately reflects the service I personally performed, and the decisions made by me with my edits as appropriate.   Best Regards, Jose Cardoso D.O.

## 2024-04-30 NOTE — HISTORY OF PRESENT ILLNESS
[FreeTextEntry1] : 11/1/23 Patient is s/p L3-4 LESI performed on 10/18/23 and states he had 50% relief to date. Patient states that he continues to feel his low back pain at its worst in the morning but slowly gets better throughout the day. Pain is dull/achy/ sharp in nature. Patient is still feeling pain after walking 1-2 blocks. Pain is less frequent since his injection rating his pain a 4/10 on the pain scale. Pt denies bowel/bladder incontinence, weakness, falls, unsteadiness.  TODAY REVISIT ENCOUNTER 04-: Patient presents to the office today for a follow up visit, he is most recently status post a lumbar interlaminar (L5-S1) epidural steroid injection under fluoroscopic guidance on 01/24/24 which provided him with 80% sustained relief for 3 months. He notes the pain has returned to baseline. He presents today with continued lower back pain at its worst in the morning but slowly gets better throughout the day.  Pain is dull/achy/ sharp in nature with radicular symptoms into his left buttock and into his posterior left thigh. He notes he has been taking the diclofenac which has provided him with minimal to no relief as well as bothers his stomach. Patient denies any bowel or bladder dysfunction, incontinence, or saddle anesthesia. Pain is 7/10.

## 2024-04-30 NOTE — PHYSICAL EXAM
[de-identified] : Lumbar Spine Exam: Palpation: + seated slump on the left   Gait: non- antalgic gait

## 2024-05-30 RX ORDER — TROSPIUM CHLORIDE 60 MG/1
60 CAPSULE, EXTENDED RELEASE ORAL
Qty: 90 | Refills: 3 | Status: ACTIVE | COMMUNITY
Start: 2022-08-23 | End: 1900-01-01

## 2024-05-30 NOTE — ED ADULT NURSE NOTE - CHPI ED NUR AGGRAVATING FX
Green leafy veggies (cooked spinach/broccoli), fresh fruits especially bananas/oranges/cantaloupe/honeydew/grapefruit; dried fruits ie prunes, raisins, dates;  sweet potatoes, zucchini, mushrooms, potatoes and sweet potatoes, coconut water     Repeat labs after pt gets her OHP insurance  
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     
Labs after pt gets her OHP insurance   Get Menveo/Bexsero now  Refill all meds     Need quantiferon gold for school     Green leafy veggies (cooked spinach/broccoli), fresh fruits especially bananas/oranges/cantaloupe/honeydew/grapefruit; dried fruits ie prunes, raisins, dates;  sweet potatoes, zucchini, mu  
Refill Vit D 50,000 IU and check level  
Restart Micronor 1/20 at bedtime  
Take MVI daily  Drink ensure up to tid  
bowel movement/food intake

## 2024-06-04 ENCOUNTER — APPOINTMENT (OUTPATIENT)
Dept: PAIN MANAGEMENT | Facility: CLINIC | Age: 79
End: 2024-06-04
Payer: MEDICARE

## 2024-06-04 DIAGNOSIS — M54.16 RADICULOPATHY, LUMBAR REGION: ICD-10-CM

## 2024-06-04 DIAGNOSIS — M70.60 TROCHANTERIC BURSITIS, UNSPECIFIED HIP: ICD-10-CM

## 2024-06-04 PROCEDURE — 20610 DRAIN/INJ JOINT/BURSA W/O US: CPT | Mod: LT

## 2024-06-04 NOTE — HISTORY OF PRESENT ILLNESS
[FreeTextEntry1] : 11/1/23 Patient is s/p L3-4 LESI performed on 10/18/23 and states he had 50% relief to date. Patient states that he continues to feel his low back pain at its worst in the morning but slowly gets better throughout the day. Pain is dull/achy/ sharp in nature. Patient is still feeling pain after walking 1-2 blocks. Pain is less frequent since his injection rating his pain a 4/10 on the pain scale. Pt denies bowel/bladder incontinence, weakness, falls, unsteadiness.  TODAY REVISIT ENCOUNTER 06-: Patient presents to the office today for a follow up visit, he is most recently status post a lumbar interlaminar (L5-S1) epidural steroid injection under fluoroscopic guidance on 01/24/24 which provided him with 80% sustained relief for 3 months. He notes the pain has returned to baseline. He presents today with continued lower back pain at its worst in the morning but slowly gets better throughout the day.  Pain is dull/achy/ sharp in nature with radicular symptoms into his left buttock and into his posterior left thigh. He notes he has been taking the diclofenac which has provided him with minimal to no relief as well as bothers his stomach. Patient denies any bowel or bladder dysfunction, incontinence, or saddle anesthesia. Pain is 7/10. Patient also has left lateral hip pain that is dull, achy in nature worse with walking and 7/10.

## 2024-06-04 NOTE — PROCEDURE
[Large Joint Injection] : Large joint injection [Left] : of the left [Greater Trochanteric Bursa] : greater trochanteric bursa [Pain] : pain [Alcohol] : alcohol [___ cc    0.25%] : Bupivacaine (Marcaine) ~Vcc of 0.25%  [___ cc    4mg] : Dexamethasone (Decadron) ~Vcc of 4 mg  [Call if redness, pain or fever occur] : call if redness, pain or fever occur [Apply ice for 15min out of every hour for the next 12-24 hours as tolerated] : apply ice for 15 minutes out of every hour for the next 12-24 hours as tolerated [Previous OTC use and PT nontherapeutic] : patient has tried OTC's including aspirin, Ibuprofen, Aleve, etc or prescription NSAIDS, and/or exercises at home and/or physical therapy without satisfactory response [Patient had decreased mobility in the joint] : patient had decreased mobility in the joint [Risks, benefits, alternatives discussed / Verbal consent obtained] : the risks benefits, and alternatives have been discussed, and verbal consent was obtained

## 2024-07-02 ENCOUNTER — APPOINTMENT (OUTPATIENT)
Dept: PAIN MANAGEMENT | Facility: CLINIC | Age: 79
End: 2024-07-02

## 2024-07-25 ENCOUNTER — APPOINTMENT (OUTPATIENT)
Dept: ORTHOPEDIC SURGERY | Facility: CLINIC | Age: 79
End: 2024-07-25

## 2024-09-18 NOTE — ED ADULT TRIAGE NOTE - WEIGHT IN KG
Emergency Department Sign Out Note        Sign out and transfer of care from GOLDEN Calderón. See Separate Emergency Department note.     The patient, Armen Llanos, was evaluated by the previous provider for vague SI, overdose on Flexeril to sleep.    Workup Completed:  Medically cleared    ED Course / Workup Pending (followup):  Signed 201, bed search.  Psych consult if needed                                  ED Course as of 09/18/24 1629   Wed Sep 18, 2024   0944 Patient with nausea, requesting zofran and nicotine gum.   0944 Patient requesting more pain medication for chronic knee pain   1000 I reviewed patient with his primary care via phone - Primary care was going to increase morphine but pharmacist refused.  Armen saw pain management but they didn't want to place pump due to recent septic knee, and didn't get along with pain specialist.   1227 Reviewed psych note - proceed with 201, clonazepam bid instead of Ativan   1556 Patient with migraine now requesting Imitrex 100 mg   1558 S/o to [place] - SI, chronic pain right knee, took several flexeril to sleep.  Signed 201.  Psych consult in computer, routine meds ordered and morphine increased from 60 to 90 bid scheduled.  Bed search in place.     Procedures  Medical Decision Making  Amount and/or Complexity of Data Reviewed  Labs: ordered.  Radiology: ordered and independent interpretation performed.    Risk  OTC drugs.  Prescription drug management.  Decision regarding hospitalization.            Disposition  Final diagnoses:   Suicidal ideations   Encounter for psychological evaluation   Anxiety   Suicide, initial encounter ([facility])   Chronic, continuous use of opioids     Time reflects when diagnosis was documented in both MDM as applicable and the Disposition within this note       Time User Action Codes Description Comment    9/18/2024  2:42 AM Arely Calderón [R45.851] Suicidal ideations     9/18/2024  2:42 AM Arely Calderón [Z00.8] Encounter for psychological  evaluation     9/18/2024  2:42 AM Arely Calderón [F41.9] Anxiety     9/18/2024 10:28 AM Dallas Rothman [X83.8XXA] Suicide, initial encounter ([facility])     9/18/2024 10:28 AM Dallas Rothman [F11.90] Chronic, continuous use of opioids           ED Disposition       ED Disposition   Transfer to Behavioral Health Condition   --    Date/Time   Wed Sep 18, 2024  2:06 AM    Comment   Armen Llanos should be transferred out to [facility] and has been medically cleared.               MD Documentation      Flowsheet Row Most Recent Value   Sending MD Dr. Arely Calderón DO          Follow-up Information    None       Patient's Medications   Discharge Prescriptions    No medications on file     No discharge procedures on file.       ED Provider  Electronically Signed by     Dallas Rothman DO  09/18/24 1230     81.6

## 2025-02-18 NOTE — ED ADULT NURSE NOTE - CAS DISCH ACCOMP BY
Brent Swenson is a 46 year old male here presenting with:    Reported symptoms: left eye upper eyelid swelling, sore, waking up with crusty eyes, blurry vision, currently has contact in        Symptoms present for: 3 days ago     Denies: injury to the eye    OTC medications/Home remedy: refresh eye drops      Recent ABX use: no    Work note needed: yes    Patient would like communication of their results via:      Cell Phone:   Telephone Information:   Mobile [phone]     Okay to leave a message containing results? Yes      Patient wearing a mask: no    Rooming staff wearing a mask: yes               Family

## 2025-05-14 ENCOUNTER — OUTPATIENT (OUTPATIENT)
Dept: OUTPATIENT SERVICES | Facility: HOSPITAL | Age: 80
LOS: 1 days | Discharge: HOME | End: 2025-05-14
Payer: MEDICARE

## 2025-05-14 VITALS — RESPIRATION RATE: 18 BRPM | DIASTOLIC BLOOD PRESSURE: 70 MMHG | HEART RATE: 58 BPM | SYSTOLIC BLOOD PRESSURE: 114 MMHG

## 2025-05-14 VITALS
DIASTOLIC BLOOD PRESSURE: 75 MMHG | RESPIRATION RATE: 18 BRPM | TEMPERATURE: 98 F | OXYGEN SATURATION: 98 % | SYSTOLIC BLOOD PRESSURE: 112 MMHG | HEIGHT: 67 IN | WEIGHT: 199.96 LBS | HEART RATE: 60 BPM

## 2025-05-14 DIAGNOSIS — Z90.79 ACQUIRED ABSENCE OF OTHER GENITAL ORGAN(S): Chronic | ICD-10-CM

## 2025-05-14 DIAGNOSIS — H25.811 COMBINED FORMS OF AGE-RELATED CATARACT, RIGHT EYE: ICD-10-CM

## 2025-05-14 DIAGNOSIS — Z95.5 PRESENCE OF CORONARY ANGIOPLASTY IMPLANT AND GRAFT: Chronic | ICD-10-CM

## 2025-05-14 PROCEDURE — V2632: CPT

## 2025-05-14 NOTE — ASU PATIENT PROFILE, ADULT - URINARY CATHETER
In an effort to ensure that our patients [employer], a Team Member has reviewed your chart and identified an opportunity to provide the best care possible. An attempt was made to discuss or schedule overdue Preventive or Disease Management screening.     The Outcome was Contact was not made, left messageIf you have any questions or need help with scheduling, contact your primary care provider.. Care Gaps include Colorectal Cancer Screening.       no

## 2025-05-14 NOTE — ASU PATIENT PROFILE, ADULT - TEACHING/LEARNING RELIGIOUS CONSIDERATIONS
"NOLANETS:  [facility]  A collaboration between [facility] and [facility]      PATIENT: Angelina Beard  MRN: [id number]  DATE: 11/12/2020    Subjective:      Chief Complaint: f/u from dialysis access problem    Dialysis without any problem catheter has been taken out    She is not much ambulatory status in the [facility]    No new complaints in any of the systems compared to what she had had before no recent change in overall clinical condition  Vitals: Blood pressure 124/64, pulse 69, temperature 98.4 °F (36.9 °C), temperature source Oral, height 5' 7" (1.702 m). --{    ECOG Score: 3 - Symptomatic, >50% confined to bed    Diagnosis: No diagnosis found.     Interval History:   8/18/2020 - Revision, Procedure Involving Arteriovenous Graft - Right and Angioplasty, Using Patch Graft - Right   Will need venogram before using the graft. If anatomy ok, will start using the graft     Incision looks good  othertwise doing ok  10/01/20   Attempt was made to use the access for dialysis.  She had bleeding she needs to have a fistulogram done to access the venous outflow.  Will consult Radiology for the above procedure and once the access this taken care of the catheter to be taken out  10/13/20  Venogram Upper Ext      Past Medical History:  Past Medical History:   Diagnosis Date    Anemia due to gastrointestinal blood loss 7/11/2018    Arthritis     Asthma     Breast cancer, left     history of left breast cancer status post mastectomy,    CHF (congestive heart failure)     home O2 2lpm    Cholelithiasis     Coronary artery disease     Debility 2019    mostly wheelchair bound but can use a walker with assistance    Diabetes mellitus, type 2     with neuropathy and nephropathy    Encounter for blood transfusion     End-stage renal disease on hemodialysis     mon-wed -fri    Hyperlipidemia     " Hypertension     Lumbar stenosis 2017    by MRI    Sarcoidosis        Past Surgical History:  Past Surgical History:   Procedure Laterality Date    av graft      left arm    BRACHIAL ARTERY GRAFT Left 05/04/2016    brachiocephalic, Dr. Anson Crocker    BREAST BIOPSY Left     breast ca    BREAST LUMPECTOMY Left     radiation only    btl      COLONOSCOPY N/A 7/13/2018    Procedure: COLONOSCOPY;  Surgeon: Almita Bee MD;  Location: [facility] ENDO;  Service: Endoscopy;  Laterality: N/A;    ELBOW SURGERY      left    ESOPHAGOGASTRODUODENOSCOPY N/A 7/13/2018    Procedure: ESOPHAGOGASTRODUODENOSCOPY (EGD);  Surgeon: Almita Bee MD;  Location: [facility] ENDO;  Service: Endoscopy;  Laterality: N/A;    INSERTION OF DIALYSIS CATHETER Right 8/13/2020    Procedure: Insertion dialysis catheter;  Surgeon: Charis Purcell MD;  Location: [facility] OR;  Service: General;  Laterality: Right;    LIPOMA RESECTION      back of neck    pilondial cyst      REPAIR OF BLOOD VESSEL WITH PATCH GRAFT Right 8/18/2020    Procedure: ANGIOPLASTY, USING PATCH GRAFT;  Surgeon: Charis Purcell MD;  Location: [facility] OR;  Service: General;  Laterality: Right;    REVISION OF PROCEDURE INVOLVING ARTERIOVENOUS GRAFT Right 8/18/2020    Procedure: REVISION, PROCEDURE INVOLVING ARTERIOVENOUS GRAFT;  Surgeon: Charis Purcell MD;  Location: [facility] OR;  Service: General;  Laterality: Right;    TOTAL KNEE ARTHROPLASTY Right 02/23/2016       Family History:  Family History   Problem Relation Age of Onset    Diabetes Mother     Kidney disease Mother     Hypertension Mother     Diabetes Sister     Heart disease Sister        Allergies:  Patient has no known allergies.    Medications:   Current Outpatient Medications   Medication Sig    amLODIPine (NORVASC) 10 MG tablet Take 1 tablet (10 mg total) by mouth once daily.    amLODIPine (NORVASC) 10 MG tablet     calcium acetate (PHOSLO) 667 mg capsule Take 1 capsule (667 mg total)  by mouth 3 (three) times daily with meals.    calcium acetate,phosphat bind, (PHOSLO) 667 mg capsule     clopidogreL (PLAVIX) 75 mg tablet Take 1 tablet (75 mg total) by mouth once daily. Hold until 8/24.    docusate sodium (COLACE) 100 MG capsule Take 1 capsule (100 mg total) by mouth 2 (two) times daily.    ELIQUIS 2.5 mg Tab     gabapentin (NEURONTIN) 300 MG capsule Take 1 capsule (300 mg total) by mouth every 8 (eight) hours as needed.    gabapentin (NEURONTIN) 300 MG capsule     HYDROcodone-acetaminophen (NORCO) 5-325 mg per tablet Take 1 tablet by mouth every 24 hours as needed for Pain.    insulin aspart U-100 (NOVOLOG) 100 unit/mL injection Inject into the skin 3 (three) times daily before meals.    metoprolol succinate (TOPROL-XL) 50 MG 24 hr tablet Take 1 tablet (50 mg total) by mouth once daily.    metoprolol succinate (TOPROL-XL) 50 MG 24 hr tablet     mirtazapine (REMERON) 7.5 MG Tab Take 1 tablet (7.5 mg total) by mouth every evening.    mirtazapine (REMERON) 7.5 MG Tab     nitroGLYCERIN (NITROSTAT) 0.4 MG SL tablet Place 1 tablet (0.4 mg total) under the tongue every 5 (five) minutes as needed for Chest pain.    ondansetron (ZOFRAN) 4 MG tablet Take 4 mg by mouth every 8 (eight) hours as needed for Nausea.    pantoprazole (PROTONIX) 40 MG tablet Take 1 tablet (40 mg total) by mouth once daily.    polyethylene glycol (GLYCOLAX) 17 gram/dose powder     pravastatin (PRAVACHOL) 80 MG tablet Take 1 tablet (80 mg total) by mouth once daily.    sertraline (ZOLOFT) 100 MG tablet Take 1 tablet (100 mg total) by mouth once daily.    sertraline (ZOLOFT) 100 MG tablet     vitamin renal formula, B-complex-vitamin c-folic acid, (NEPHROCAP) 1 mg Cap Take 1 capsule by mouth once daily.    apixaban (ELIQUIS) 5 mg Tab Take 1 tablet (5 mg total) by mouth 2 (two) times daily.    pravastatin (PRAVACHOL) 80 MG tablet      No current facility-administered medications for this visit.         Review of  Systems   Constitutional: Positive for activity change, appetite change and fatigue. Negative for diaphoresis and unexpected weight change.   HENT: Negative.    Eyes: Negative.    Respiratory: Negative.    Cardiovascular: Negative.    Gastrointestinal: Negative.    Endocrine: Negative.    Genitourinary: Negative.    Musculoskeletal: Negative.    Allergic/Immunologic: Negative.    Neurological: Negative.    Psychiatric/Behavioral: Negative.       Objective:      Physical Exam  HENT:      Head: Normocephalic.      Nose: Nose normal.   Neck:      Musculoskeletal: No neck rigidity or muscular tenderness.      Vascular: No carotid bruit.   Cardiovascular:      Rate and Rhythm: Normal rate and regular rhythm.   Pulmonary:      Effort: Pulmonary effort is normal.      Breath sounds: Normal breath sounds.   Musculoskeletal:      Comments: Patent fistula right upper extremity suture taken out   Lymphadenopathy:      Cervical: No cervical adenopathy.   Neurological:      General: No focal deficit present.      Mental Status: She is alert.        Assessment:       No diagnosis found.    Laboratory Data:       Scans:   SURG FL Surgery Fluoro Usage  See OP Notes for results.     IMPRESSION: See OP Notes for results.     This procedure was auto-finalized by: Virtual Radiologist       Impression:  End-stage renal disease functioning AV fistula right upper forearm    Plan:       I called the dialysis unit the do not have any problem with the AV graft.  Continue present management follow-up with me CELSO Alvarez MD, FACS   Associate Professor of Surgery, [facility]   Neuroendocrine Surgery, Hepatic/Pancreatic & General Surgery   [address]   [place] [address]   ph. [phone]; [phone]   fax. [phone]     none

## 2025-05-14 NOTE — ASU PATIENT PROFILE, ADULT - FALL HARM RISK - HARM RISK INTERVENTIONS

## 2025-05-14 NOTE — ASU PATIENT PROFILE, ADULT - NSICDXPASTMEDICALHX_GEN_ALL_CORE_FT
PAST MEDICAL HISTORY:  GERD (gastroesophageal reflux disease)     High cholesterol     MI (myocardial infarction)     Prostate cancer

## 2025-05-21 DIAGNOSIS — H25.811 COMBINED FORMS OF AGE-RELATED CATARACT, RIGHT EYE: ICD-10-CM

## 2025-05-21 DIAGNOSIS — E78.00 PURE HYPERCHOLESTEROLEMIA, UNSPECIFIED: ICD-10-CM

## 2025-05-21 DIAGNOSIS — Z95.5 PRESENCE OF CORONARY ANGIOPLASTY IMPLANT AND GRAFT: ICD-10-CM

## 2025-05-21 DIAGNOSIS — I25.2 OLD MYOCARDIAL INFARCTION: ICD-10-CM

## 2025-05-21 DIAGNOSIS — Z79.82 LONG TERM (CURRENT) USE OF ASPIRIN: ICD-10-CM

## 2025-05-21 DIAGNOSIS — K21.9 GASTRO-ESOPHAGEAL REFLUX DISEASE WITHOUT ESOPHAGITIS: ICD-10-CM
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
## 2025-06-12 PROBLEM — E78.00 PURE HYPERCHOLESTEROLEMIA, UNSPECIFIED: Chronic | Status: ACTIVE | Noted: 2025-05-14

## 2025-06-12 PROBLEM — K21.9 GASTRO-ESOPHAGEAL REFLUX DISEASE WITHOUT ESOPHAGITIS: Chronic | Status: ACTIVE | Noted: 2025-05-14

## 2025-07-09 ENCOUNTER — TRANSCRIPTION ENCOUNTER (OUTPATIENT)
Age: 80
End: 2025-07-09

## 2025-07-09 ENCOUNTER — OUTPATIENT (OUTPATIENT)
Dept: OUTPATIENT SERVICES | Facility: HOSPITAL | Age: 80
LOS: 1 days | Discharge: HOME | End: 2025-07-09
Payer: MEDICARE

## 2025-07-09 VITALS
RESPIRATION RATE: 17 BRPM | TEMPERATURE: 98 F | HEIGHT: 67 IN | SYSTOLIC BLOOD PRESSURE: 136 MMHG | WEIGHT: 199.96 LBS | OXYGEN SATURATION: 100 % | DIASTOLIC BLOOD PRESSURE: 79 MMHG | HEART RATE: 58 BPM

## 2025-07-09 VITALS — SYSTOLIC BLOOD PRESSURE: 146 MMHG | RESPIRATION RATE: 15 BRPM | HEART RATE: 60 BPM | DIASTOLIC BLOOD PRESSURE: 83 MMHG

## 2025-07-09 DIAGNOSIS — Z90.79 ACQUIRED ABSENCE OF OTHER GENITAL ORGAN(S): Chronic | ICD-10-CM

## 2025-07-09 DIAGNOSIS — Z98.49 CATARACT EXTRACTION STATUS, UNSPECIFIED EYE: Chronic | ICD-10-CM

## 2025-07-09 DIAGNOSIS — Z95.5 PRESENCE OF CORONARY ANGIOPLASTY IMPLANT AND GRAFT: Chronic | ICD-10-CM

## 2025-07-09 DIAGNOSIS — H25.812 COMBINED FORMS OF AGE-RELATED CATARACT, LEFT EYE: ICD-10-CM

## 2025-07-09 PROCEDURE — V2632: CPT

## 2025-07-09 NOTE — PRE-ANESTHESIA EVALUATION ADULT - NSANTHOSAYNRD_GEN_A_CORE
No. MALLORY screening performed.  STOP BANG Legend: 0-2 = LOW Risk; 3-4 = INTERMEDIATE Risk; 5-8 = HIGH Risk

## 2025-07-09 NOTE — ASU PATIENT PROFILE, ADULT - BLOOD TRANSFUSION, PREVIOUS, PROFILE
A refill request was received for:  Requested Prescriptions     Pending Prescriptions Disp Refills    allopurinol 300 MG Oral Tab 45 tablet 1     Sig: Take 0.5 tablets (150 mg total) by mouth daily.       Last refill date:   1-9-24    Last office visit: 6-20-23    Follow up due:  Future Appointments   Date Time Provider Department Center   6/24/2024  9:00 AM Agusto Howell MD [facility] [address]   3/27/2025 10:15 AM Derick Leon MD [id number] [address]         
no

## 2025-07-09 NOTE — ASU PATIENT PROFILE, ADULT - NSICDXPASTSURGICALHX_GEN_ALL_CORE_FT
PAST SURGICAL HISTORY:  H/O cataract extraction R IOL    H/O prostatectomy     Stented coronary artery

## 2025-07-09 NOTE — ASU PATIENT PROFILE, ADULT - FALL HARM RISK - HARM RISK INTERVENTIONS

## 2025-07-09 NOTE — ASU DISCHARGE PLAN (ADULT/PEDIATRIC) - FINANCIAL ASSISTANCE
[facility] provides services at a reduced cost to those who are determined to be eligible through [facility]’s financial assistance program. Information regarding [facility]’s financial assistance program can be found by going to [email or web address] or by calling [phone].

## 2025-07-14 DIAGNOSIS — H25.812 COMBINED FORMS OF AGE-RELATED CATARACT, LEFT EYE: ICD-10-CM

## 2025-07-14 DIAGNOSIS — C61 MALIGNANT NEOPLASM OF PROSTATE: ICD-10-CM

## 2025-07-14 DIAGNOSIS — I25.2 OLD MYOCARDIAL INFARCTION: ICD-10-CM

## 2025-07-14 DIAGNOSIS — E78.00 PURE HYPERCHOLESTEROLEMIA, UNSPECIFIED: ICD-10-CM

## 2025-07-14 DIAGNOSIS — K21.9 GASTRO-ESOPHAGEAL REFLUX DISEASE WITHOUT ESOPHAGITIS: ICD-10-CM

## 2025-07-14 DIAGNOSIS — Z95.5 PRESENCE OF CORONARY ANGIOPLASTY IMPLANT AND GRAFT: ICD-10-CM
